# Patient Record
Sex: FEMALE | Race: BLACK OR AFRICAN AMERICAN | NOT HISPANIC OR LATINO | Employment: UNEMPLOYED | RURAL
[De-identification: names, ages, dates, MRNs, and addresses within clinical notes are randomized per-mention and may not be internally consistent; named-entity substitution may affect disease eponyms.]

---

## 2019-10-01 ENCOUNTER — HISTORICAL (OUTPATIENT)
Dept: ADMINISTRATIVE | Facility: HOSPITAL | Age: 19
End: 2019-10-01

## 2019-10-03 LAB
LAB AP CLINICAL INFORMATION: NORMAL
LAB AP DIAGNOSIS - HISTORICAL: NORMAL
LAB AP GROSS PATHOLOGY - HISTORICAL: NORMAL
LAB AP SPECIMEN SUBMITTED - HISTORICAL: NORMAL

## 2020-09-01 ENCOUNTER — HISTORICAL (OUTPATIENT)
Dept: ADMINISTRATIVE | Facility: HOSPITAL | Age: 20
End: 2020-09-01

## 2020-09-08 LAB
LAB AP CLINICAL INFORMATION: NORMAL
LAB AP COMMENTS: NORMAL
LAB AP GENERAL CAT - HISTORICAL: NORMAL
LAB AP INTERPRETATION/RESULT - HISTORICAL: NEGATIVE
LAB AP SPECIMEN ADEQUACY - HISTORICAL: NORMAL
LAB AP SPECIMEN SUBMITTED - HISTORICAL: NORMAL

## 2021-07-14 ENCOUNTER — HOSPITAL ENCOUNTER (EMERGENCY)
Facility: HOSPITAL | Age: 21
Discharge: HOME | End: 2021-07-14
Payer: MEDICAID

## 2021-07-14 VITALS
DIASTOLIC BLOOD PRESSURE: 73 MMHG | HEART RATE: 99 BPM | BODY MASS INDEX: 27.58 KG/M2 | TEMPERATURE: 100 F | WEIGHT: 182 LBS | OXYGEN SATURATION: 97 % | SYSTOLIC BLOOD PRESSURE: 114 MMHG | HEIGHT: 68 IN | RESPIRATION RATE: 18 BRPM

## 2021-07-14 DIAGNOSIS — R05.9 COUGH: ICD-10-CM

## 2021-07-14 DIAGNOSIS — U07.1 COVID-19 VIRUS INFECTION: Primary | ICD-10-CM

## 2021-07-14 LAB — SARS-COV-2 RNA RESP QL NAA+PROBE: POSITIVE

## 2021-07-14 PROCEDURE — 99283 EMERGENCY DEPT VISIT LOW MDM: CPT | Mod: ,,, | Performed by: NURSE PRACTITIONER

## 2021-07-14 PROCEDURE — 99284 EMERGENCY DEPT VISIT MOD MDM: CPT | Performed by: NURSE PRACTITIONER

## 2021-07-14 PROCEDURE — 99284 EMERGENCY DEPT VISIT MOD MDM: CPT

## 2021-07-14 PROCEDURE — 99283 PR EMERGENCY DEPT VISIT,LEVEL III: ICD-10-PCS | Mod: ,,, | Performed by: NURSE PRACTITIONER

## 2021-07-14 PROCEDURE — 87635 SARS-COV-2 COVID-19 AMP PRB: CPT | Performed by: NURSE PRACTITIONER

## 2021-07-14 RX ORDER — ALBUTEROL SULFATE 90 UG/1
2 AEROSOL, METERED RESPIRATORY (INHALATION) EVERY 6 HOURS PRN
Qty: 18 G | Refills: 0 | Status: SHIPPED | OUTPATIENT
Start: 2021-07-14 | End: 2022-07-14

## 2021-07-14 RX ORDER — FAMOTIDINE 40 MG/1
40 TABLET, FILM COATED ORAL 2 TIMES DAILY
Qty: 30 TABLET | Refills: 0 | Status: SHIPPED | OUTPATIENT
Start: 2021-07-14 | End: 2023-01-30 | Stop reason: CLARIF

## 2021-07-14 RX ORDER — AZITHROMYCIN 250 MG/1
TABLET, FILM COATED ORAL
Qty: 6 TABLET | Refills: 0 | Status: SHIPPED | OUTPATIENT
Start: 2021-07-14 | End: 2023-01-30 | Stop reason: CLARIF

## 2021-07-14 RX ORDER — ALBUTEROL SULFATE 0.63 MG/3ML
0.63 SOLUTION RESPIRATORY (INHALATION) EVERY 6 HOURS PRN
Qty: 1 BOX | Refills: 0 | Status: SHIPPED | OUTPATIENT
Start: 2021-07-14 | End: 2021-07-14

## 2021-07-14 RX ORDER — METHYLPREDNISOLONE 4 MG/1
TABLET ORAL
Qty: 21 TABLET | Refills: 0 | Status: SHIPPED | OUTPATIENT
Start: 2021-07-14 | End: 2023-01-30 | Stop reason: CLARIF

## 2022-04-25 ENCOUNTER — OFFICE VISIT (OUTPATIENT)
Dept: OBSTETRICS AND GYNECOLOGY | Facility: CLINIC | Age: 22
End: 2022-04-25
Payer: MEDICAID

## 2022-04-25 VITALS
DIASTOLIC BLOOD PRESSURE: 93 MMHG | WEIGHT: 210 LBS | OXYGEN SATURATION: 99 % | SYSTOLIC BLOOD PRESSURE: 139 MMHG | BODY MASS INDEX: 31.83 KG/M2 | HEIGHT: 68 IN | HEART RATE: 79 BPM | TEMPERATURE: 98 F

## 2022-04-25 DIAGNOSIS — Z30.016 ENCOUNTER FOR INITIAL PRESCRIPTION OF TRANSDERMAL PATCH HORMONAL CONTRACEPTIVE DEVICE: ICD-10-CM

## 2022-04-25 DIAGNOSIS — Z30.46 ENCOUNTER FOR NEXPLANON REMOVAL: Primary | ICD-10-CM

## 2022-04-25 PROCEDURE — 3008F BODY MASS INDEX DOCD: CPT | Mod: CPTII,,, | Performed by: ADVANCED PRACTICE MIDWIFE

## 2022-04-25 PROCEDURE — 1159F PR MEDICATION LIST DOCUMENTED IN MEDICAL RECORD: ICD-10-PCS | Mod: CPTII,,, | Performed by: ADVANCED PRACTICE MIDWIFE

## 2022-04-25 PROCEDURE — 11982 REMOVE DRUG IMPLANT DEVICE: CPT | Mod: ,,, | Performed by: ADVANCED PRACTICE MIDWIFE

## 2022-04-25 PROCEDURE — 3080F PR MOST RECENT DIASTOLIC BLOOD PRESSURE >= 90 MM HG: ICD-10-PCS | Mod: CPTII,,, | Performed by: ADVANCED PRACTICE MIDWIFE

## 2022-04-25 PROCEDURE — 11982 PR REMOVAL DRUG IMPLANT DEVICE: ICD-10-PCS | Mod: ,,, | Performed by: ADVANCED PRACTICE MIDWIFE

## 2022-04-25 PROCEDURE — 3008F PR BODY MASS INDEX (BMI) DOCUMENTED: ICD-10-PCS | Mod: CPTII,,, | Performed by: ADVANCED PRACTICE MIDWIFE

## 2022-04-25 PROCEDURE — 3075F PR MOST RECENT SYSTOLIC BLOOD PRESS GE 130-139MM HG: ICD-10-PCS | Mod: CPTII,,, | Performed by: ADVANCED PRACTICE MIDWIFE

## 2022-04-25 PROCEDURE — 1159F MED LIST DOCD IN RCRD: CPT | Mod: CPTII,,, | Performed by: ADVANCED PRACTICE MIDWIFE

## 2022-04-25 PROCEDURE — 99499 UNLISTED E&M SERVICE: CPT | Mod: ,,, | Performed by: ADVANCED PRACTICE MIDWIFE

## 2022-04-25 PROCEDURE — 3080F DIAST BP >= 90 MM HG: CPT | Mod: CPTII,,, | Performed by: ADVANCED PRACTICE MIDWIFE

## 2022-04-25 PROCEDURE — 3075F SYST BP GE 130 - 139MM HG: CPT | Mod: CPTII,,, | Performed by: ADVANCED PRACTICE MIDWIFE

## 2022-04-25 PROCEDURE — 99499 NO LOS: ICD-10-PCS | Mod: ,,, | Performed by: ADVANCED PRACTICE MIDWIFE

## 2022-04-25 RX ORDER — NORELGESTROMIN AND ETHINYL ESTRADIOL 35; 150 UG/MG; UG/MG
1 PATCH TRANSDERMAL
Qty: 3 PATCH | Refills: 11 | Status: SHIPPED | OUTPATIENT
Start: 2022-04-25 | End: 2022-06-20

## 2022-04-25 NOTE — PROGRESS NOTES
Nexplanon REMOVAL:    Pt is a 22 y.o. with LMP Patient's last menstrual period was 04/04/2022 (lmp unknown). here for Nexplanon removal because she desires to change contraceptive. Had Nexplanon placed 2020, has had problems with breakthrough bleeding.       PRE-Nexplanon REMOVAL COUNSELING:  The patient was advised of minimal risks of bleeding and pain and she agrees to proceed.    EXAM:  Nexplanon palpable by palpation.   The end closest to the elbow was marked.    PROCEDURE:  TIME OUT PERFORMED.  The patient semi north position with nondominant arm raised.   The area was prepped with antiseptic.  2 cc of 1% lidocaine with epi was injected just underneath end of implant closest to the elbow.  Downward pressure was placed on the end of the implant closest to the axilla.  Using sterile technique, a 2-3 mm incision was made in longitudinal direction of arm at tip of the implant closest to the elbow.    The entire 4 cm implant was removed.  The incision site was closed with steri strips and a small adhesive bandage and then pressure bandage was placed over insertion site.  The procedure was well tolerated     Assessment       POST NEXPLANON REMOVAL COUNSELING:  Expect period-like flow to occur after Nexplanon removal and periods to return to pre-Nexplanon pattern.  Manage post Nexplanon removal arm pain with Tylenol or NSAIDs  Keep arm elevated and apply intermittent ice packs to decrease pain and bruising for 24 hours.  May remove bandage in 24 hours, remove steri-strips in 5-7 days  Nexplanon removal danger signs (worsening pain at incision site, bleeding through  bandage, redness and/or pus drainage at incision site).    POST NEXPLANON REMOVAL CONTRACEPTION: Patches    PLAN:  Counseling lasted approximately 15 minutes and all her questions were answered.      ICD-10-CM ICD-9-CM    1. Encounter for Nexplanon removal  Z30.46 V25.43    2. Encounter for initial prescription of transdermal patch hormonal contraceptive  device  Z30.016 V25.02 norelgestromin-ethinyl estradiol (ORTHO EVRA) 150-35 mcg/24 hr   3. BMI 31.0-31.9,adult  Z68.31 V85.31        Follow up in about 3 months (around 7/25/2022) for surveillance of .

## 2022-05-12 ENCOUNTER — OFFICE VISIT (OUTPATIENT)
Dept: OBSTETRICS AND GYNECOLOGY | Facility: CLINIC | Age: 22
End: 2022-05-12
Payer: MEDICAID

## 2022-05-12 VITALS
HEIGHT: 68 IN | SYSTOLIC BLOOD PRESSURE: 135 MMHG | WEIGHT: 210.19 LBS | HEART RATE: 75 BPM | RESPIRATION RATE: 17 BRPM | DIASTOLIC BLOOD PRESSURE: 84 MMHG | BODY MASS INDEX: 31.86 KG/M2 | OXYGEN SATURATION: 99 %

## 2022-05-12 DIAGNOSIS — N92.1 MENORRHAGIA WITH IRREGULAR CYCLE: Primary | ICD-10-CM

## 2022-05-12 PROCEDURE — 3075F PR MOST RECENT SYSTOLIC BLOOD PRESS GE 130-139MM HG: ICD-10-PCS | Mod: CPTII,,, | Performed by: ADVANCED PRACTICE MIDWIFE

## 2022-05-12 PROCEDURE — 3079F PR MOST RECENT DIASTOLIC BLOOD PRESSURE 80-89 MM HG: ICD-10-PCS | Mod: CPTII,,, | Performed by: ADVANCED PRACTICE MIDWIFE

## 2022-05-12 PROCEDURE — 3008F BODY MASS INDEX DOCD: CPT | Mod: CPTII,,, | Performed by: ADVANCED PRACTICE MIDWIFE

## 2022-05-12 PROCEDURE — 99212 OFFICE O/P EST SF 10 MIN: CPT | Mod: ,,, | Performed by: ADVANCED PRACTICE MIDWIFE

## 2022-05-12 PROCEDURE — 3075F SYST BP GE 130 - 139MM HG: CPT | Mod: CPTII,,, | Performed by: ADVANCED PRACTICE MIDWIFE

## 2022-05-12 PROCEDURE — 99212 PR OFFICE/OUTPT VISIT, EST, LEVL II, 10-19 MIN: ICD-10-PCS | Mod: ,,, | Performed by: ADVANCED PRACTICE MIDWIFE

## 2022-05-12 PROCEDURE — 3008F PR BODY MASS INDEX (BMI) DOCUMENTED: ICD-10-PCS | Mod: CPTII,,, | Performed by: ADVANCED PRACTICE MIDWIFE

## 2022-05-12 PROCEDURE — 1159F PR MEDICATION LIST DOCUMENTED IN MEDICAL RECORD: ICD-10-PCS | Mod: CPTII,,, | Performed by: ADVANCED PRACTICE MIDWIFE

## 2022-05-12 PROCEDURE — 3079F DIAST BP 80-89 MM HG: CPT | Mod: CPTII,,, | Performed by: ADVANCED PRACTICE MIDWIFE

## 2022-05-12 PROCEDURE — 1159F MED LIST DOCD IN RCRD: CPT | Mod: CPTII,,, | Performed by: ADVANCED PRACTICE MIDWIFE

## 2022-05-12 RX ORDER — MEDROXYPROGESTERONE ACETATE 10 MG/1
10 TABLET ORAL DAILY
Qty: 10 TABLET | Refills: 0 | Status: SHIPPED | OUTPATIENT
Start: 2022-05-12 | End: 2022-06-20

## 2022-05-12 NOTE — PROGRESS NOTES
"Patient ID:  Nida Gold is a 22 y.o. female      Chief Complaint:   Chief Complaint   Patient presents with    Vaginal Bleeding     X 3 weeks        HPI:  Nida is in with c/o vaginal bleeding x 3 weeks. She had Nexplanon removed 2022, started on patches at that time. Likes using patches, denies ACHES. Discussed changes in menstrual cycle bleeding as common with contraceptive changes.   LMP: Patient's last menstrual period was 2022.   Sexually active:  yes  Contraceptive: patches  Last Pap: ASCUS 2020 in [facility], no HPV noted      History reviewed. No pertinent past medical history.  History reviewed. No pertinent surgical history.    OB History        2    Para   2    Term                AB        Living   2       SAB        IAB        Ectopic        Multiple        Live Births   2                 /84 (BP Location: Right arm)   Pulse 75   Resp 17   Ht 5' 8" (1.727 m)   Wt 95.3 kg (210 lb 3.2 oz)   LMP 2022   SpO2 99%   BMI 31.96 kg/m²   Wt Readings from Last 3 Encounters:   22 95.3 kg (210 lb 3.2 oz)   22 95.3 kg (210 lb)   21 82.6 kg (182 lb)          ROS:  Review of Systems   Constitutional: Negative.    Eyes: Negative.    Respiratory: Negative.    Cardiovascular: Negative.    Gastrointestinal: Negative.    Genitourinary: Positive for menorrhagia and menstrual problem.   Musculoskeletal: Negative.    Neurological: Negative.    Psychiatric/Behavioral: Negative.           PHYSICAL EXAM:  Physical Exam  Constitutional:       Appearance: Normal appearance. She is obese.   Eyes:      Extraocular Movements: Extraocular movements intact.   Cardiovascular:      Rate and Rhythm: Normal rate.      Pulses: Normal pulses.   Pulmonary:      Effort: Pulmonary effort is normal.   Abdominal:      Palpations: Abdomen is soft.   Musculoskeletal:         General: Normal range of motion.      Cervical back: Normal range of motion.   Skin:     General: Skin is " warm and dry.   Neurological:      Mental Status: She is alert and oriented to person, place, and time.   Psychiatric:         Mood and Affect: Mood normal.         Behavior: Behavior normal.         Thought Content: Thought content normal.         Judgment: Judgment normal.          Assessment:  Nida was seen today for vaginal bleeding.    Diagnoses and all orders for this visit:    Menorrhagia with irregular cycle  -     medroxyPROGESTERone (PROVERA) 10 MG tablet; Take 1 tablet (10 mg total) by mouth once daily. for 10 days    BMI 31.0-31.9,adult          ICD-10-CM ICD-9-CM    1. Menorrhagia with irregular cycle  N92.1 626.2 medroxyPROGESTERone (PROVERA) 10 MG tablet   2. BMI 31.0-31.9,adult  Z68.31 V85.31        Plan:  Discussed changes in cycle bleeding common with contraceptive  Provera dosing rx sent with instructions on usage    Follow up if symptoms worsen or fail to improve.

## 2022-06-18 ENCOUNTER — HOSPITAL ENCOUNTER (EMERGENCY)
Facility: HOSPITAL | Age: 22
Discharge: STILL ADMITTED OR ADMINISTRATIVE | End: 2022-06-18
Payer: MEDICAID

## 2022-06-18 VITALS
OXYGEN SATURATION: 98 % | RESPIRATION RATE: 18 BRPM | HEART RATE: 84 BPM | HEIGHT: 68 IN | BODY MASS INDEX: 32.01 KG/M2 | TEMPERATURE: 98 F | DIASTOLIC BLOOD PRESSURE: 90 MMHG | WEIGHT: 211.19 LBS | SYSTOLIC BLOOD PRESSURE: 145 MMHG

## 2022-06-18 DIAGNOSIS — Z11.3 SCREENING FOR STDS (SEXUALLY TRANSMITTED DISEASES): Primary | ICD-10-CM

## 2022-06-18 PROCEDURE — 99281 EMR DPT VST MAYX REQ PHY/QHP: CPT | Mod: ,,, | Performed by: NURSE PRACTITIONER

## 2022-06-18 PROCEDURE — 99281 PR EMERGENCY DEPT VISIT,LEVEL I: ICD-10-PCS | Mod: ,,, | Performed by: NURSE PRACTITIONER

## 2022-06-18 PROCEDURE — 99281 EMR DPT VST MAYX REQ PHY/QHP: CPT

## 2022-06-19 NOTE — ED TRIAGE NOTES
PRESENTS TO ER WITH REPORT OF HEADACHE X 1 DAY. DENIES COUGH, FEVER, SHORTNESS OF BREATH AND CHEST PAIN. REPORTS LAST MENSTRUAL PERIOD WAS 6/14/2022 BUT WE COULD CHECK THAT TODAY. ALSO HERE FOR A COVID TEST PER ADMISSIONS INITIAL COMPLAINT

## 2022-06-20 ENCOUNTER — OFFICE VISIT (OUTPATIENT)
Dept: OBSTETRICS AND GYNECOLOGY | Facility: CLINIC | Age: 22
End: 2022-06-20
Payer: MEDICAID

## 2022-06-20 ENCOUNTER — PATIENT MESSAGE (OUTPATIENT)
Dept: OBSTETRICS AND GYNECOLOGY | Facility: CLINIC | Age: 22
End: 2022-06-20
Payer: MEDICAID

## 2022-06-20 VITALS
OXYGEN SATURATION: 99 % | BODY MASS INDEX: 31.32 KG/M2 | HEIGHT: 68 IN | DIASTOLIC BLOOD PRESSURE: 98 MMHG | WEIGHT: 206.63 LBS | SYSTOLIC BLOOD PRESSURE: 132 MMHG | HEART RATE: 99 BPM

## 2022-06-20 DIAGNOSIS — Z32.02 URINE PREGNANCY TEST NEGATIVE: ICD-10-CM

## 2022-06-20 DIAGNOSIS — Z20.2 ENCOUNTER FOR ASSESSMENT OF STD EXPOSURE: ICD-10-CM

## 2022-06-20 DIAGNOSIS — Z30.011 ENCOUNTER FOR INITIAL PRESCRIPTION OF CONTRACEPTIVE PILLS: ICD-10-CM

## 2022-06-20 DIAGNOSIS — Z12.4 ENCOUNTER FOR PAPANICOLAOU SMEAR FOR CERVICAL CANCER SCREENING: ICD-10-CM

## 2022-06-20 DIAGNOSIS — Z01.419 WOMEN'S ANNUAL ROUTINE GYNECOLOGICAL EXAMINATION: Primary | ICD-10-CM

## 2022-06-20 DIAGNOSIS — Z72.51 HIGH RISK HETEROSEXUAL BEHAVIOR: ICD-10-CM

## 2022-06-20 PROBLEM — N92.1 MENORRHAGIA WITH IRREGULAR CYCLE: Status: RESOLVED | Noted: 2022-05-12 | Resolved: 2022-06-20

## 2022-06-20 LAB
CANDIDA SPECIES: NEGATIVE
GARDNERELLA: POSITIVE
HIV 1+O+2 AB SERPL QL: NORMAL
SYPHILIS AB INTERPRETATION: NORMAL
TRICHOMONAS: POSITIVE

## 2022-06-20 PROCEDURE — 96372 THER/PROPH/DIAG INJ SC/IM: CPT | Mod: ,,, | Performed by: ADVANCED PRACTICE MIDWIFE

## 2022-06-20 PROCEDURE — 87389 HIV 1 / 2 ANTIBODY: ICD-10-PCS | Mod: ,,, | Performed by: CLINICAL MEDICAL LABORATORY

## 2022-06-20 PROCEDURE — 87389 HIV-1 AG W/HIV-1&-2 AB AG IA: CPT | Mod: ,,, | Performed by: CLINICAL MEDICAL LABORATORY

## 2022-06-20 PROCEDURE — 99395 PREV VISIT EST AGE 18-39: CPT | Mod: 25,,, | Performed by: ADVANCED PRACTICE MIDWIFE

## 2022-06-20 PROCEDURE — 87660 BACTERIAL VAGINOSIS: ICD-10-PCS | Mod: ,,, | Performed by: CLINICAL MEDICAL LABORATORY

## 2022-06-20 PROCEDURE — 86780 TREPONEMA PALLIDUM: CPT | Mod: ,,, | Performed by: CLINICAL MEDICAL LABORATORY

## 2022-06-20 PROCEDURE — 87660 TRICHOMONAS VAGIN DIR PROBE: CPT | Mod: ,,, | Performed by: CLINICAL MEDICAL LABORATORY

## 2022-06-20 PROCEDURE — 86780 TREPONEMA PALLIDUM (SYPHILIS) ANTIBODY: ICD-10-PCS | Mod: ,,, | Performed by: CLINICAL MEDICAL LABORATORY

## 2022-06-20 PROCEDURE — 87510 BACTERIAL VAGINOSIS: ICD-10-PCS | Mod: ,,, | Performed by: CLINICAL MEDICAL LABORATORY

## 2022-06-20 PROCEDURE — 87510 GARDNER VAG DNA DIR PROBE: CPT | Mod: ,,, | Performed by: CLINICAL MEDICAL LABORATORY

## 2022-06-20 PROCEDURE — 87480 BACTERIAL VAGINOSIS: ICD-10-PCS | Mod: ,,, | Performed by: CLINICAL MEDICAL LABORATORY

## 2022-06-20 PROCEDURE — 1159F MED LIST DOCD IN RCRD: CPT | Mod: CPTII,,, | Performed by: ADVANCED PRACTICE MIDWIFE

## 2022-06-20 PROCEDURE — 3080F PR MOST RECENT DIASTOLIC BLOOD PRESSURE >= 90 MM HG: ICD-10-PCS | Mod: CPTII,,, | Performed by: ADVANCED PRACTICE MIDWIFE

## 2022-06-20 PROCEDURE — 87480 CANDIDA DNA DIR PROBE: CPT | Mod: ,,, | Performed by: CLINICAL MEDICAL LABORATORY

## 2022-06-20 PROCEDURE — 3008F BODY MASS INDEX DOCD: CPT | Mod: CPTII,,, | Performed by: ADVANCED PRACTICE MIDWIFE

## 2022-06-20 PROCEDURE — 3075F SYST BP GE 130 - 139MM HG: CPT | Mod: CPTII,,, | Performed by: ADVANCED PRACTICE MIDWIFE

## 2022-06-20 PROCEDURE — 87591 N.GONORRHOEAE DNA AMP PROB: CPT | Mod: ,,, | Performed by: CLINICAL MEDICAL LABORATORY

## 2022-06-20 PROCEDURE — 88141 THINPREP PAP TEST: ICD-10-PCS | Mod: ,,, | Performed by: PATHOLOGY

## 2022-06-20 PROCEDURE — 1159F PR MEDICATION LIST DOCUMENTED IN MEDICAL RECORD: ICD-10-PCS | Mod: CPTII,,, | Performed by: ADVANCED PRACTICE MIDWIFE

## 2022-06-20 PROCEDURE — 88141 CYTOPATH C/V INTERPRET: CPT | Mod: ,,, | Performed by: PATHOLOGY

## 2022-06-20 PROCEDURE — 87491 CHLAMYDIA/GONORRHOEAE(GC), PCR: ICD-10-PCS | Mod: ,,, | Performed by: CLINICAL MEDICAL LABORATORY

## 2022-06-20 PROCEDURE — 88142 CYTOPATH C/V THIN LAYER: CPT | Mod: GCY | Performed by: ADVANCED PRACTICE MIDWIFE

## 2022-06-20 PROCEDURE — 87591 CHLAMYDIA/GONORRHOEAE(GC), PCR: ICD-10-PCS | Mod: ,,, | Performed by: CLINICAL MEDICAL LABORATORY

## 2022-06-20 PROCEDURE — 3008F PR BODY MASS INDEX (BMI) DOCUMENTED: ICD-10-PCS | Mod: CPTII,,, | Performed by: ADVANCED PRACTICE MIDWIFE

## 2022-06-20 PROCEDURE — 87624 HPV HI-RISK TYP POOLED RSLT: CPT | Mod: ,,, | Performed by: CLINICAL MEDICAL LABORATORY

## 2022-06-20 PROCEDURE — 99395 PR PREVENTIVE VISIT,EST,18-39: ICD-10-PCS | Mod: 25,,, | Performed by: ADVANCED PRACTICE MIDWIFE

## 2022-06-20 PROCEDURE — 3075F PR MOST RECENT SYSTOLIC BLOOD PRESS GE 130-139MM HG: ICD-10-PCS | Mod: CPTII,,, | Performed by: ADVANCED PRACTICE MIDWIFE

## 2022-06-20 PROCEDURE — 3080F DIAST BP >= 90 MM HG: CPT | Mod: CPTII,,, | Performed by: ADVANCED PRACTICE MIDWIFE

## 2022-06-20 PROCEDURE — 87624 HUMAN PAPILLOMAVIRUS (HPV): ICD-10-PCS | Mod: ,,, | Performed by: CLINICAL MEDICAL LABORATORY

## 2022-06-20 PROCEDURE — 87491 CHLMYD TRACH DNA AMP PROBE: CPT | Mod: ,,, | Performed by: CLINICAL MEDICAL LABORATORY

## 2022-06-20 PROCEDURE — 96372 PR INJECTION,THERAP/PROPH/DIAG2ST, IM OR SUBCUT: ICD-10-PCS | Mod: ,,, | Performed by: ADVANCED PRACTICE MIDWIFE

## 2022-06-20 RX ORDER — MEDROXYPROGESTERONE ACETATE 150 MG/ML
150 INJECTION, SUSPENSION INTRAMUSCULAR
Status: COMPLETED | OUTPATIENT
Start: 2022-06-20 | End: 2022-06-20

## 2022-06-20 RX ADMIN — MEDROXYPROGESTERONE ACETATE 150 MG: 150 INJECTION, SUSPENSION INTRAMUSCULAR at 02:06

## 2022-06-20 NOTE — PROGRESS NOTES
"  Patient ID: Nida Gold is a 22 y.o. female     Chief Complaint:   Chief Complaint   Patient presents with    Annual Exam     Pap: last pap over two years ago     STD CHECK       HPI: Nida Gold is a 22 y.o. female who presents for well woman exam.  No issues, problems, or complaints.  Last Pap ASCUS 2020, unknown HPV. She uses patches for contraceptive but does not like them as she has not been using them consistently. Reports last cycles lasted 11 days after last patches came off while she was swimming. Denies ACHES, vag discharge, and abnormal vag bleeding. She has used implant previously, removed to conceive. Discussed contraceptive options including r/b/a implant, injection, OCP, IUD, and ring. She desires to start depo today.   LMP: Patient's last menstrual period was 2022.  Sexually active: yes   Contraceptive: patches, last used 2 weeks past  Mammogram: n/a    Past Medical History:   Diagnosis Date    Menorrhagia with irregular cycle 2022       History reviewed. No pertinent surgical history.    Social History     Socioeconomic History    Marital status: Single   Tobacco Use    Smoking status: Never Smoker    Smokeless tobacco: Never Used   Substance and Sexual Activity    Alcohol use: Never    Drug use: Never    Sexual activity: Yes     Partners: Male     Birth control/protection: Patch       History reviewed. No pertinent family history.    OB History        2    Para   2    Term                AB        Living   2       SAB        IAB        Ectopic        Multiple        Live Births   2                 BP (!) 132/98 (BP Location: Right arm, Patient Position: Sitting, BP Method: Large (Automatic))   Pulse 99   Ht 5' 8" (1.727 m)   Wt 93.7 kg (206 lb 9.6 oz)   LMP 2022   SpO2 99%   BMI 31.41 kg/m²     Wt Readings from Last 3 Encounters:   22 93.7 kg (206 lb 9.6 oz)   22 95.8 kg (211 lb 3.2 oz)   22 95.3 kg (210 lb 3.2 oz)    "     ROS:  Review of Systems   Constitutional: Negative.    Eyes: Negative.    Respiratory: Negative.    Cardiovascular: Negative.    Gastrointestinal: Negative.    Endocrine: Negative.    Genitourinary: Negative.    Musculoskeletal: Negative.    Integumentary:  Negative.   Neurological: Negative.    Hematological: Negative.    Psychiatric/Behavioral: Negative.    Breast: negative.         PHYSICAL EXAM:  Physical Exam  Constitutional:       Appearance: Normal appearance. She is obese.   HENT:      Head: Normocephalic.      Nose: Nose normal.   Eyes:      Extraocular Movements: Extraocular movements intact.   Cardiovascular:      Rate and Rhythm: Normal rate and regular rhythm.      Pulses: Normal pulses.      Heart sounds: Normal heart sounds.   Pulmonary:      Effort: Pulmonary effort is normal.      Breath sounds: Normal breath sounds.   Abdominal:      Palpations: Abdomen is soft.   Genitourinary:     General: Normal vulva.      Exam position: Lithotomy position.      Pubic Area: No rash.       Labia:         Right: No rash.         Left: No rash.       Urethra: No prolapse.      Vagina: Normal.      Cervix: Normal.      Uterus: Normal.       Adnexa:         Right: No tenderness.          Left: No tenderness.     Musculoskeletal:         General: Normal range of motion.      Cervical back: Normal range of motion and neck supple.   Skin:     General: Skin is warm and dry.   Neurological:      Mental Status: She is alert and oriented to person, place, and time.   Psychiatric:         Mood and Affect: Mood normal.         Behavior: Behavior normal.         Thought Content: Thought content normal.         Judgment: Judgment normal.          Assessment:  Women's annual routine gynecological examination    Encounter for assessment of STD exposure  -     Bacterial Vaginosis; Future; Expected date: 06/20/2022  -     Chlamydia/GC, PCR; Future; Expected date: 06/20/2022  -     HIV 1/2 Ag/Ab (4th Gen); Future; Expected  date: 06/20/2022  -     Treponema Pallidum (Syphillis) Antibody; Future; Expected date: 06/20/2022    High risk heterosexual behavior  -     Bacterial Vaginosis; Future; Expected date: 06/20/2022  -     Chlamydia/GC, PCR; Future; Expected date: 06/20/2022  -     HIV 1/2 Ag/Ab (4th Gen); Future; Expected date: 06/20/2022  -     Treponema Pallidum (Syphillis) Antibody; Future; Expected date: 06/20/2022    Encounter for Papanicolaou smear for cervical cancer screening  -     ThinPrep Pap Test; Future; Expected date: 06/20/2022    Encounter for initial prescription of contraceptive pills  -     POCT urine pregnancy  -     medroxyPROGESTERone (DEPO-PROVERA) injection 150 mg    Urine pregnancy test negative    BMI 31.0-31.9,adult          ICD-10-CM ICD-9-CM    1. Women's annual routine gynecological examination  Z01.419 V72.31    2. Encounter for assessment of STD exposure  Z76.89 V72.85 Bacterial Vaginosis      Chlamydia/GC, PCR      HIV 1/2 Ag/Ab (4th Gen)      Treponema Pallidum (Syphillis) Antibody      Bacterial Vaginosis      Chlamydia/GC, PCR      HIV 1/2 Ag/Ab (4th Gen)      Treponema Pallidum (Syphillis) Antibody   3. High risk heterosexual behavior  Z72.51 V69.2 Bacterial Vaginosis      Chlamydia/GC, PCR      HIV 1/2 Ag/Ab (4th Gen)      Treponema Pallidum (Syphillis) Antibody      Bacterial Vaginosis      Chlamydia/GC, PCR      HIV 1/2 Ag/Ab (4th Gen)      Treponema Pallidum (Syphillis) Antibody   4. Encounter for Papanicolaou smear for cervical cancer screening  Z12.4 V76.2 ThinPrep Pap Test      ThinPrep Pap Test   5. Encounter for initial prescription of contraceptive pills  Z30.011 V25.01 POCT urine pregnancy      medroxyPROGESTERone (DEPO-PROVERA) injection 150 mg   6. Urine pregnancy test negative  Z32.02 V72.41    7. BMI 31.0-31.9,adult  Z68.31 V85.31        Plan:  Annual exam completed, Pap and Affirm collected  Urine sent for GC/Chlamydia  Labs ordered and drawn  Will call with results, encouraged use  of My Chart for review  UPT negative  Depo injection 150 mg given IM, reviewed possible s/e  Patient was counseled today on ASCCP Pap guidelines and recommendations for yearly pelvic exams   Encouraged healthy dietary choices, increasing water intake, and exercising at least 3 x weekly    Follow up in about 1 year (around 6/20/2023) for annual gyn exam.

## 2022-06-21 DIAGNOSIS — N76.0 BACTERIAL VAGINAL INFECTION: Primary | ICD-10-CM

## 2022-06-21 DIAGNOSIS — B96.89 BACTERIAL VAGINAL INFECTION: Primary | ICD-10-CM

## 2022-06-21 DIAGNOSIS — A59.9 TRICHIMONIASIS: ICD-10-CM

## 2022-06-21 LAB
CHLAMYDIA BY PCR: NEGATIVE
N. GONORRHOEAE (GC) BY PCR: NEGATIVE

## 2022-06-21 RX ORDER — FLUCONAZOLE 150 MG/1
150 TABLET ORAL
Qty: 2 TABLET | Refills: 0 | Status: SHIPPED | OUTPATIENT
Start: 2022-06-21 | End: 2022-06-29

## 2022-06-21 RX ORDER — METRONIDAZOLE 500 MG/1
500 TABLET ORAL 2 TIMES DAILY
Qty: 14 TABLET | Refills: 0 | Status: SHIPPED | OUTPATIENT
Start: 2022-06-21 | End: 2022-06-28

## 2022-06-21 NOTE — PROGRESS NOTES
Nida has seen her results on My Chart. Let her know Trich is a STD requiring partner treatment.  I have sent prescriptions to her pharmacy for Flagyl and Diflucan, Flagyl treats both BV and Trich. Can treat partner if she wants. No sex until at least a week after both have been treated. Will send message or call with Pap results.

## 2022-06-22 ENCOUNTER — TELEPHONE (OUTPATIENT)
Dept: OBSTETRICS AND GYNECOLOGY | Facility: CLINIC | Age: 22
End: 2022-06-22
Payer: MEDICAID

## 2022-06-22 NOTE — TELEPHONE ENCOUNTER
----- Message from Nitin Alves sent at 6/21/2022 11:22 AM CDT -----  Regarding: Patient is needing to talk to a nurse about an issue.  Patient is needing to talk to a nurse about an issue.  Please call  [phone]

## 2022-06-23 LAB
GH SERPL-MCNC: ABNORMAL NG/ML
INSULIN SERPL-ACNC: ABNORMAL U[IU]/ML
LAB AP CLINICAL INFORMATION: ABNORMAL
LAB AP GYN INTERPRETATION: ABNORMAL
LAB AP PAP DISCLAIMER COMMENTS: ABNORMAL
RENIN PLAS-CCNC: ABNORMAL NG/ML/H

## 2022-06-24 ENCOUNTER — TELEPHONE (OUTPATIENT)
Dept: OBSTETRICS AND GYNECOLOGY | Facility: CLINIC | Age: 22
End: 2022-06-24
Payer: MEDICAID

## 2022-06-24 LAB
HPV 16: NEGATIVE
HPV 18: NEGATIVE
HPV OTHER: NEGATIVE

## 2022-06-26 NOTE — ED PROVIDER NOTES
Encounter Date: 6/18/2022       History     Chief Complaint   Patient presents with    Headache     Patient presents to ER with complaint of headache.  Upon entering room, patient states she is here for STD testing.  She denies headache.  She denies symptoms of STD but states she just wants to know that she is ok.  She denies change in sexual partners. Denies known exposure to STD.    The history is provided by the patient. No  was used.     Review of patient's allergies indicates:  No Known Allergies  Past Medical History:   Diagnosis Date    Menorrhagia with irregular cycle 5/12/2022     History reviewed. No pertinent surgical history.  History reviewed. No pertinent family history.  Social History     Tobacco Use    Smoking status: Never Smoker    Smokeless tobacco: Never Used   Substance Use Topics    Alcohol use: Never    Drug use: Never     Review of Systems   Genitourinary:        Desires STD screen.    All other systems reviewed and are negative.      Physical Exam     Initial Vitals [06/18/22 1925]   BP Pulse Resp Temp SpO2   (!) 145/90 84 18 98.4 °F (36.9 °C) 98 %      MAP       --         Physical Exam    Nursing note and vitals reviewed.  Constitutional: She appears well-developed and well-nourished.   HENT:   Head: Normocephalic.   Right Ear: External ear normal.   Left Ear: External ear normal.   Nose: Nose normal.   Mouth/Throat: Oropharynx is clear and moist.   Eyes: Conjunctivae and EOM are normal. Pupils are equal, round, and reactive to light.   Neck: Neck supple.   Normal range of motion.  Cardiovascular: Normal rate, regular rhythm, normal heart sounds and intact distal pulses.   Pulmonary/Chest: Breath sounds normal.   Abdominal: Abdomen is soft. Bowel sounds are normal.   Genitourinary:    Genitourinary Comments: Deferred.     Musculoskeletal:         General: Normal range of motion.      Cervical back: Normal range of motion and neck supple.     Neurological: She is  alert and oriented to person, place, and time. She has normal strength. GCS score is 15. GCS eye subscore is 4. GCS verbal subscore is 5. GCS motor subscore is 6.   Skin: Skin is warm and dry. Capillary refill takes less than 2 seconds.   Psychiatric: She has a normal mood and affect. Her behavior is normal. Judgment and thought content normal.         Medical Screening Exam   See Full Note    ED Course   Procedures  Labs Reviewed - No data to display       Imaging Results    None          Medications - No data to display                    Clinical Impression:   Final diagnoses:  [Z11.3] Screening for STDs (sexually transmitted diseases) (Primary)          ED Disposition Condition    Discharge Stable        ED Prescriptions     None        Follow-up Information    None          MONTANA Rangel  06/26/22 1220

## 2022-11-04 ENCOUNTER — HOSPITAL ENCOUNTER (EMERGENCY)
Facility: HOSPITAL | Age: 22
Discharge: HOME | End: 2022-11-04
Payer: MEDICAID

## 2022-11-04 VITALS
HEIGHT: 66 IN | TEMPERATURE: 98 F | SYSTOLIC BLOOD PRESSURE: 118 MMHG | WEIGHT: 193 LBS | BODY MASS INDEX: 31.02 KG/M2 | RESPIRATION RATE: 20 BRPM | HEART RATE: 85 BPM | DIASTOLIC BLOOD PRESSURE: 79 MMHG | OXYGEN SATURATION: 98 %

## 2022-11-04 DIAGNOSIS — J06.9 VIRAL URI WITH COUGH: Primary | ICD-10-CM

## 2022-11-04 LAB
FLUAV AG UPPER RESP QL IA.RAPID: NEGATIVE
FLUBV AG UPPER RESP QL IA.RAPID: NEGATIVE
SARS-COV+SARS-COV-2 AG RESP QL IA.RAPID: NEGATIVE

## 2022-11-04 PROCEDURE — 99282 PR EMERGENCY DEPT VISIT,LEVEL II: ICD-10-PCS | Mod: ,,, | Performed by: NURSE PRACTITIONER

## 2022-11-04 PROCEDURE — 99283 EMERGENCY DEPT VISIT LOW MDM: CPT

## 2022-11-04 PROCEDURE — 87428 SARSCOV & INF VIR A&B AG IA: CPT | Performed by: NURSE PRACTITIONER

## 2022-11-04 PROCEDURE — 99282 EMERGENCY DEPT VISIT SF MDM: CPT | Mod: ,,, | Performed by: NURSE PRACTITIONER

## 2022-11-04 NOTE — ED PROVIDER NOTES
Encounter Date: 11/4/2022       History     Chief Complaint   Patient presents with    Influenza     22 year old female presents to the emergency department to be evaluated for runny nose, cough, fever, bodyaches that began three days ago. Her niece recently tested positive for the flu, and she has been around her. Her daughter also has similar symptoms    The history is provided by the patient.   URI  The primary symptoms include cough. Primary symptoms do not include fever, fatigue, headaches, ear pain, sore throat, swollen glands, wheezing, abdominal pain, nausea, vomiting, myalgias, arthralgias or rash.   Symptoms associated with the illness include congestion and rhinorrhea.   Review of patient's allergies indicates:  No Known Allergies  Past Medical History:   Diagnosis Date    Menorrhagia with irregular cycle 5/12/2022     No past surgical history on file.  No family history on file.  Social History     Tobacco Use    Smoking status: Never    Smokeless tobacco: Never   Substance Use Topics    Alcohol use: Never    Drug use: Never     Review of Systems   Constitutional:  Negative for fatigue and fever.   HENT:  Positive for congestion and rhinorrhea. Negative for ear pain and sore throat.    Respiratory:  Positive for cough. Negative for wheezing.    Gastrointestinal:  Negative for abdominal pain, nausea and vomiting.   Musculoskeletal:  Negative for arthralgias and myalgias.   Skin:  Negative for rash.   Neurological:  Negative for headaches.   All other systems reviewed and are negative.    Physical Exam     Initial Vitals [11/04/22 0817]   BP Pulse Resp Temp SpO2   118/79 85 20 98.4 °F (36.9 °C) 98 %      MAP       --         Physical Exam    Vitals reviewed.  Constitutional: She appears well-developed and well-nourished.   HENT:   Right Ear: Tympanic membrane normal.   Left Ear: Tympanic membrane normal.   Mouth/Throat: Oropharynx is clear and moist.   Neck: Neck supple.   Cardiovascular:  Normal  rate and regular rhythm.           Pulmonary/Chest: Breath sounds normal.   Abdominal: Abdomen is soft. Bowel sounds are normal. She exhibits no distension and no mass. There is no abdominal tenderness. There is no rebound and no guarding.   Musculoskeletal:         General: Normal range of motion.      Cervical back: Neck supple.     Neurological: She is alert and oriented to person, place, and time. She has normal strength. GCS score is 15. GCS eye subscore is 4. GCS verbal subscore is 5. GCS motor subscore is 6.   Skin: Skin is warm and dry. Capillary refill takes less than 2 seconds.   Psychiatric: She has a normal mood and affect.       Medical Screening Exam   See Full Note    ED Course   Procedures  Labs Reviewed   SARS-COV2 (COVID) W/ FLU ANTIGEN          Imaging Results    None          Medications - No data to display                    Clinical Impression:   Final diagnoses:  [J06.9] Viral URI with cough (Primary)        ED Disposition Condition    Discharge Stable          ED Prescriptions    None       Follow-up Information    None          MONTANA Rosales  11/04/22 0857

## 2023-01-30 ENCOUNTER — HOSPITAL ENCOUNTER (EMERGENCY)
Facility: HOSPITAL | Age: 23
Discharge: HOME | End: 2023-01-30
Payer: MEDICAID

## 2023-01-30 VITALS
HEART RATE: 98 BPM | SYSTOLIC BLOOD PRESSURE: 134 MMHG | OXYGEN SATURATION: 100 % | TEMPERATURE: 98 F | HEIGHT: 69 IN | BODY MASS INDEX: 31.99 KG/M2 | WEIGHT: 216 LBS | RESPIRATION RATE: 18 BRPM | DIASTOLIC BLOOD PRESSURE: 69 MMHG

## 2023-01-30 DIAGNOSIS — R10.9 ABDOMINAL PAIN, UNSPECIFIED ABDOMINAL LOCATION: Primary | ICD-10-CM

## 2023-01-30 DIAGNOSIS — K52.9 GASTROENTERITIS: ICD-10-CM

## 2023-01-30 LAB
ALBUMIN SERPL BCP-MCNC: 3.5 G/DL (ref 3.5–5)
ALBUMIN/GLOB SERPL: 0.9 {RATIO}
ALP SERPL-CCNC: 110 U/L (ref 37–98)
ALT SERPL W P-5'-P-CCNC: 16 U/L (ref 13–56)
ANION GAP SERPL CALCULATED.3IONS-SCNC: 9 MMOL/L (ref 7–16)
AST SERPL W P-5'-P-CCNC: 14 U/L (ref 15–37)
B-HCG UR QL: NEGATIVE
BACTERIA #/AREA URNS HPF: ABNORMAL /HPF
BASOPHILS # BLD AUTO: 0.02 K/UL (ref 0–0.2)
BASOPHILS NFR BLD AUTO: 0.2 % (ref 0–1)
BILIRUB SERPL-MCNC: 0.4 MG/DL (ref ?–1.2)
BILIRUB UR QL STRIP: NEGATIVE
BUN SERPL-MCNC: 9 MG/DL (ref 7–18)
BUN/CREAT SERPL: 14 (ref 6–20)
CALCIUM SERPL-MCNC: 8.4 MG/DL (ref 8.5–10.1)
CHLORIDE SERPL-SCNC: 104 MMOL/L (ref 98–107)
CLARITY UR: CLEAR
CO2 SERPL-SCNC: 29 MMOL/L (ref 21–32)
COLOR UR: ABNORMAL
CREAT SERPL-MCNC: 0.63 MG/DL (ref 0.55–1.02)
CTP QC/QA: YES
DIFFERENTIAL METHOD BLD: ABNORMAL
EGFR (NO RACE VARIABLE) (RUSH/TITUS): 128 ML/MIN/1.73M²
EOSINOPHIL # BLD AUTO: 0.03 K/UL (ref 0–0.5)
EOSINOPHIL NFR BLD AUTO: 0.3 % (ref 1–4)
ERYTHROCYTE [DISTWIDTH] IN BLOOD BY AUTOMATED COUNT: 13.5 % (ref 11.5–14.5)
GLOBULIN SER-MCNC: 4 G/DL (ref 2–4)
GLUCOSE SERPL-MCNC: 87 MG/DL (ref 74–106)
GLUCOSE UR STRIP-MCNC: NORMAL MG/DL
HCT VFR BLD AUTO: 38.8 % (ref 38–47)
HGB BLD-MCNC: 12.5 G/DL (ref 12–16)
IMM GRANULOCYTES # BLD AUTO: 0.02 K/UL (ref 0–0.04)
IMM GRANULOCYTES NFR BLD: 0.2 % (ref 0–0.4)
KETONES UR STRIP-SCNC: NEGATIVE MG/DL
LEUKOCYTE ESTERASE UR QL STRIP: ABNORMAL
LIPASE SERPL-CCNC: 98 U/L (ref 73–393)
LYMPHOCYTES # BLD AUTO: 0.99 K/UL (ref 1–4.8)
LYMPHOCYTES NFR BLD AUTO: 10.9 % (ref 27–41)
MCH RBC QN AUTO: 28.2 PG (ref 27–31)
MCHC RBC AUTO-ENTMCNC: 32.2 G/DL (ref 32–36)
MCV RBC AUTO: 87.6 FL (ref 80–96)
MONOCYTES # BLD AUTO: 0.51 K/UL (ref 0–0.8)
MONOCYTES NFR BLD AUTO: 5.6 % (ref 2–6)
MPC BLD CALC-MCNC: 12.1 FL (ref 9.4–12.4)
MUCOUS THREADS #/AREA URNS HPF: ABNORMAL /HPF
NEUTROPHILS # BLD AUTO: 7.52 K/UL (ref 1.8–7.7)
NEUTROPHILS NFR BLD AUTO: 82.8 % (ref 53–65)
NITRITE UR QL STRIP: NEGATIVE
NRBC # BLD AUTO: 0 X10E3/UL
NRBC, AUTO (.00): 0 %
PH UR STRIP: 7.5 PH UNITS
PLATELET # BLD AUTO: 185 K/UL (ref 150–400)
POTASSIUM SERPL-SCNC: 3.6 MMOL/L (ref 3.5–5.1)
PROT SERPL-MCNC: 7.5 G/DL (ref 6.4–8.2)
PROT UR QL STRIP: NEGATIVE
RBC # BLD AUTO: 4.43 M/UL (ref 4.2–5.4)
RBC # UR STRIP: NEGATIVE /UL
RBC #/AREA URNS HPF: ABNORMAL /HPF
SODIUM SERPL-SCNC: 138 MMOL/L (ref 136–145)
SP GR UR STRIP: 1.02
SQUAMOUS #/AREA URNS LPF: ABNORMAL /LPF
TRICHOMONAS #/AREA URNS HPF: ABNORMAL /HPF
UROBILINOGEN UR STRIP-ACNC: NORMAL MG/DL
WBC # BLD AUTO: 9.09 K/UL (ref 4.5–11)
WBC #/AREA URNS HPF: ABNORMAL /HPF
YEAST #/AREA URNS HPF: ABNORMAL /HPF

## 2023-01-30 PROCEDURE — 80053 COMPREHEN METABOLIC PANEL: CPT | Performed by: NURSE PRACTITIONER

## 2023-01-30 PROCEDURE — 83690 ASSAY OF LIPASE: CPT | Performed by: NURSE PRACTITIONER

## 2023-01-30 PROCEDURE — 99283 PR EMERGENCY DEPT VISIT,LEVEL III: ICD-10-PCS | Mod: ,,, | Performed by: NURSE PRACTITIONER

## 2023-01-30 PROCEDURE — 99284 EMERGENCY DEPT VISIT MOD MDM: CPT

## 2023-01-30 PROCEDURE — 85025 COMPLETE CBC W/AUTO DIFF WBC: CPT | Performed by: NURSE PRACTITIONER

## 2023-01-30 PROCEDURE — 81025 URINE PREGNANCY TEST: CPT | Performed by: NURSE PRACTITIONER

## 2023-01-30 PROCEDURE — 99283 EMERGENCY DEPT VISIT LOW MDM: CPT | Mod: ,,, | Performed by: NURSE PRACTITIONER

## 2023-01-30 PROCEDURE — 81001 URINALYSIS AUTO W/SCOPE: CPT | Performed by: NURSE PRACTITIONER

## 2023-01-30 NOTE — PROVIDER PROGRESS NOTES - EMERGENCY DEPT.
Encounter Date: 1/30/2023    ED Physician Progress Notes        MDM  22-year-old female presents to the emergency department to be evaluated for abdominal pain that began 6 hours ago.  She describes her pain as being generalized, crampy and intermittent.  She has had some mild nausea.  Denies any vomiting.  Her nausea has subsided.  She reports that she had a large loose stool this morning.  Denies any abdominal pain at this time.  Denies vaginal discharge, dysuria, fever, chills.  I ordered labs and personally reviewed them.   I ordered X-rays and personally reviewed them and reviewed the radiologist interpretation.  Xray significant for no acute process.    Diagnosis is abdominal pain, gastroenteritis.  Patient was discharged in stable condition.  Detailed return precautions discussed.

## 2023-01-30 NOTE — ED PROVIDER NOTES
Encounter Date: 1/30/2023       History     Chief Complaint   Patient presents with    Abdominal Pain     22-year-old female presents to the emergency department to be evaluated for abdominal pain that began 6 hours ago.  She describes her pain as being generalized, crampy and intermittent.  She has had some mild nausea.  Denies any vomiting.  Her nausea has subsided.  She reports that she had a large loose stool this morning.  Denies any abdominal pain at this time.  Denies vaginal discharge, dysuria, fever, chills.    The history is provided by the patient.   Abdominal Pain  The current episode started several days ago. The abdominal pain is generalized. The other symptoms of the illness include nausea. The other symptoms of the illness do not include fever, fatigue, jaundice, melena, vomiting, dysuria, hematemesis, hematochezia, vaginal discharge or vaginal bleeding.   Review of patient's allergies indicates:  No Known Allergies  Past Medical History:   Diagnosis Date    Menorrhagia with irregular cycle 5/12/2022     No past surgical history on file.  No family history on file.  Social History     Tobacco Use    Smoking status: Never    Smokeless tobacco: Never   Substance Use Topics    Alcohol use: Never    Drug use: Never     Review of Systems   Constitutional:  Negative for fatigue and fever.   Gastrointestinal:  Positive for abdominal pain and nausea. Negative for hematemesis, hematochezia, jaundice, melena and vomiting.   Genitourinary:  Negative for dysuria, vaginal bleeding and vaginal discharge.   All other systems reviewed and are negative.    Physical Exam     Initial Vitals [01/30/23 0937]   BP Pulse Resp Temp SpO2   134/69 98 18 97.7 °F (36.5 °C) 100 %      MAP       --         Physical Exam    Vitals reviewed.  Constitutional: She appears well-developed and well-nourished.   Neck: Neck supple.   Cardiovascular:  Normal rate and regular rhythm.           Pulmonary/Chest: Breath sounds normal.    Abdominal: Abdomen is soft. Bowel sounds are normal. She exhibits no distension and no mass. There is no abdominal tenderness. There is no rebound and no guarding.   Musculoskeletal:         General: Normal range of motion.      Cervical back: Neck supple.     Neurological: She is alert and oriented to person, place, and time. She has normal strength. GCS score is 15. GCS eye subscore is 4. GCS verbal subscore is 5. GCS motor subscore is 6.   Skin: Skin is warm and dry. Capillary refill takes less than 2 seconds.   Psychiatric: She has a normal mood and affect.       Medical Screening Exam   See Full Note    ED Course   Procedures  Labs Reviewed   COMPREHENSIVE METABOLIC PANEL - Abnormal; Notable for the following components:       Result Value    Calcium 8.4 (*)     Alk Phos 110 (*)     AST 14 (*)     All other components within normal limits   URINALYSIS - Abnormal; Notable for the following components:    Leukocytes, UA Moderate (*)     All other components within normal limits   CBC WITH DIFFERENTIAL - Abnormal; Notable for the following components:    Neutrophils % 82.8 (*)     Lymphocytes % 10.9 (*)     Eosinophils % 0.3 (*)     Lymphocytes, Absolute 0.99 (*)     All other components within normal limits   URINALYSIS, MICROSCOPIC - Abnormal; Notable for the following components:    Bacteria, UA Few (*)     Squamous Epithelial Cells, UA Few (*)     Mucus, UA Rare (*)     All other components within normal limits   LIPASE - Normal   CBC W/ AUTO DIFFERENTIAL    Narrative:     The following orders were created for panel order CBC auto differential.  Procedure                               Abnormality         Status                     ---------                               -----------         ------                     CBC with Differential[[id number]]        Abnormal            Final result                 Please view results for these tests on the individual orders.   POCT URINE PREGNANCY          Imaging  Results              XR ABDOMEN, ACUTE 2 OR MORE VIEWS WITH CHEST (Final result)  Result time 01/30/23 10:12:45      Final result by Bruno Ashley II, MD (01/30/23 10:12:45)                   Impression:      No evidence of abnormality demonstrated      Electronically signed by: Bruno Ashley  Date:    01/30/2023  Time:    10:12               Narrative:    EXAMINATION:  XR ABDOMEN ACUTE 2 OR MORE VIEWS WITH CHEST    CLINICAL HISTORY:  Abdominal pain abdominal pain;    COMPARISON:  28 May 2018    FINDINGS:  No free fluid or free air seen.  The bowel gas pattern appears within normal limits.  No abnormal calcifications are present. Chest shows no evidence of abnormality. No other abnormality is identified.                                       Medications - No data to display                    Clinical Impression:   Final diagnoses:  [R10.9] Abdominal pain, unspecified abdominal location (Primary)  [K52.9] Gastroenteritis        ED Disposition Condition    Discharge Stable          ED Prescriptions    None       Follow-up Information    None          MONTANA Rosales  01/30/23 6266

## 2023-01-31 ENCOUNTER — TELEPHONE (OUTPATIENT)
Dept: EMERGENCY MEDICINE | Facility: HOSPITAL | Age: 23
End: 2023-01-31
Payer: MEDICAID

## 2023-03-20 ENCOUNTER — HOSPITAL ENCOUNTER (EMERGENCY)
Facility: HOSPITAL | Age: 23
Discharge: HOME | End: 2023-03-20
Payer: MEDICAID

## 2023-03-20 VITALS
WEIGHT: 219.5 LBS | OXYGEN SATURATION: 99 % | SYSTOLIC BLOOD PRESSURE: 118 MMHG | TEMPERATURE: 99 F | DIASTOLIC BLOOD PRESSURE: 74 MMHG | HEART RATE: 76 BPM | HEIGHT: 68 IN | BODY MASS INDEX: 33.27 KG/M2 | RESPIRATION RATE: 18 BRPM

## 2023-03-20 DIAGNOSIS — K08.89 PAIN, DENTAL: Primary | ICD-10-CM

## 2023-03-20 PROCEDURE — 99281 EMR DPT VST MAYX REQ PHY/QHP: CPT

## 2023-03-20 PROCEDURE — 99283 EMERGENCY DEPT VISIT LOW MDM: CPT | Mod: ,,, | Performed by: NURSE PRACTITIONER

## 2023-03-20 PROCEDURE — 99283 PR EMERGENCY DEPT VISIT,LEVEL III: ICD-10-PCS | Mod: ,,, | Performed by: NURSE PRACTITIONER

## 2023-03-20 RX ORDER — KETOROLAC TROMETHAMINE 30 MG/ML
30 INJECTION, SOLUTION INTRAMUSCULAR; INTRAVENOUS
Status: DISCONTINUED | OUTPATIENT
Start: 2023-03-20 | End: 2023-03-21 | Stop reason: HOSPADM

## 2023-03-21 NOTE — ED PROVIDER NOTES
Encounter Date: 3/20/2023       History     Chief Complaint   Patient presents with    Dental Pain     C/o left bottom side dental pain.  supposed to have wisdom teeth cut out on 3/27/23      24 y/o AAF presents with c/o left side jaw pain. Reports known issue with wisdom teeth, was placed on abx last week and instructed to take tylenol and ibuprofen and complete antibiotics.  has appt for 03/27 for wisdom teeth removal. When questioned pt  has only been taking 100-200mg of ibuprofen at a time, once in the morning and once at night. She denies fevers, chills, nausea, vomiting.      The history is provided by the patient.   Review of patient's allergies indicates:  No Known Allergies  Past Medical History:   Diagnosis Date    Menorrhagia with irregular cycle 5/12/2022     History reviewed. No pertinent surgical history.  History reviewed. No pertinent family history.  Social History     Tobacco Use    Smoking status: Never    Smokeless tobacco: Never   Substance Use Topics    Alcohol use: Never    Drug use: Never     Review of Systems   Constitutional:  Negative for appetite change, chills and fever.   HENT:  Positive for dental problem.    All other systems reviewed and are negative.    Physical Exam     Initial Vitals [03/20/23 2215]   BP Pulse Resp Temp SpO2   118/74 76 18 98.9 °F (37.2 °C) 99 %      MAP       --         Physical Exam    Constitutional: She appears well-developed and well-nourished. She is cooperative.   HENT:   Mouth/Throat: Oropharynx is clear and moist and mucous membranes are normal. Dental caries present. No dental abscesses.   Cardiovascular:  Normal rate, regular rhythm, normal heart sounds and normal pulses.           Pulmonary/Chest: Effort normal and breath sounds normal.   Abdominal: Abdomen is soft. Bowel sounds are normal. There is no abdominal tenderness.     Neurological: She is alert and oriented to person, place, and time.   Skin: Skin is warm, dry and intact.  Capillary refill takes less than 2 seconds.   Psychiatric: She has a normal mood and affect. Her speech is normal and behavior is normal. Judgment and thought content normal. Cognition and memory are normal.       Medical Screening Exam   See Full Note    ED Course   Procedures  Labs Reviewed - No data to display       Imaging Results    None          Medications   ketorolac injection 30 mg (has no administration in time range)     Medical Decision Making:   Initial Assessment:   22 y/o AAF presents with c/o left side jaw pain. Reports known issue with wisdom teeth, was placed on abx last week and instructed to take tylenol and ibuprofen and complete antibiotics.  has appt for 03/27 for wisdom teeth removal. When questioned pt  has only been taking 100-200mg of ibuprofen at a time, once in the morning and once at night. She denies fevers, chills, nausea, vomiting.   Differential Diagnosis:   Dental pain  Lismore teeth eruption  Dental abscess  Vs other  ED Management:  IM toradol given. Counseled on supportive measures and tylenol and ibuprofen dosing. Strict f/u instructions given. Warning s/s discussed and return precautions given; the patient has v/u.                   Clinical Impression:   Final diagnoses:  [K08.89] Pain, dental (Primary)        ED Disposition Condition    Discharge Stable          ED Prescriptions    None       Follow-up Information       Follow up With Specialties Details Why Contact Info    Dentist   as previously directed              MONTANA Luke  03/20/23 4066

## 2023-03-21 NOTE — DISCHARGE INSTRUCTIONS
Alternate tylenol and ibuprofen as directed as needed for pain. Keep your appointment with your dentist or oral surgeon as previously directed or sooner if no improvement. Return to the ED for worsening signs and symptoms or otherwise as needed.

## 2023-04-11 ENCOUNTER — PATIENT MESSAGE (OUTPATIENT)
Dept: RESEARCH | Facility: HOSPITAL | Age: 23
End: 2023-04-11

## 2023-06-27 ENCOUNTER — PATIENT MESSAGE (OUTPATIENT)
Dept: RESEARCH | Facility: HOSPITAL | Age: 23
End: 2023-06-27

## 2023-07-05 ENCOUNTER — PATIENT MESSAGE (OUTPATIENT)
Dept: RESEARCH | Facility: HOSPITAL | Age: 23
End: 2023-07-05

## 2023-07-11 ENCOUNTER — PATIENT MESSAGE (OUTPATIENT)
Dept: RESEARCH | Facility: HOSPITAL | Age: 23
End: 2023-07-11

## 2024-01-20 ENCOUNTER — HOSPITAL ENCOUNTER (EMERGENCY)
Facility: HOSPITAL | Age: 24
Discharge: LEFT BEFORE DISCHARGE | End: 2024-01-20
Payer: MEDICAID

## 2024-01-20 VITALS
HEART RATE: 85 BPM | WEIGHT: 195.5 LBS | TEMPERATURE: 99 F | HEIGHT: 68 IN | OXYGEN SATURATION: 100 % | BODY MASS INDEX: 29.63 KG/M2 | DIASTOLIC BLOOD PRESSURE: 73 MMHG | RESPIRATION RATE: 16 BRPM | SYSTOLIC BLOOD PRESSURE: 120 MMHG

## 2024-01-20 DIAGNOSIS — Z34.90 PREGNANCY, UNSPECIFIED GESTATIONAL AGE: Primary | ICD-10-CM

## 2024-01-20 LAB
ALBUMIN SERPL BCP-MCNC: 3.4 G/DL (ref 3.5–5)
ALBUMIN/GLOB SERPL: 0.8 {RATIO}
ALP SERPL-CCNC: 72 U/L (ref 37–98)
ALT SERPL W P-5'-P-CCNC: 8 U/L (ref 13–56)
ANION GAP SERPL CALCULATED.3IONS-SCNC: 7 MMOL/L (ref 7–16)
AST SERPL W P-5'-P-CCNC: 10 U/L (ref 15–37)
B-HCG UR QL: POSITIVE
BACTERIA #/AREA URNS HPF: ABNORMAL /HPF
BASOPHILS # BLD AUTO: 0.03 K/UL (ref 0–0.2)
BASOPHILS NFR BLD AUTO: 0.4 % (ref 0–1)
BILIRUB SERPL-MCNC: 0.4 MG/DL (ref ?–1.2)
BILIRUB UR QL STRIP: NEGATIVE
BUN SERPL-MCNC: 7 MG/DL (ref 7–18)
BUN/CREAT SERPL: 13 (ref 6–20)
CALCIUM SERPL-MCNC: 8.6 MG/DL (ref 8.5–10.1)
CHLORIDE SERPL-SCNC: 109 MMOL/L (ref 98–107)
CLARITY UR: CLEAR
CO2 SERPL-SCNC: 26 MMOL/L (ref 21–32)
COLOR UR: ABNORMAL
CREAT SERPL-MCNC: 0.56 MG/DL (ref 0.55–1.02)
CTP QC/QA: YES
DIFFERENTIAL METHOD BLD: ABNORMAL
EGFR (NO RACE VARIABLE) (RUSH/TITUS): 131 ML/MIN/1.73M2
EOSINOPHIL # BLD AUTO: 0.02 K/UL (ref 0–0.5)
EOSINOPHIL NFR BLD AUTO: 0.3 % (ref 1–4)
ERYTHROCYTE [DISTWIDTH] IN BLOOD BY AUTOMATED COUNT: 16.6 % (ref 11.5–14.5)
GLOBULIN SER-MCNC: 4.2 G/DL (ref 2–4)
GLUCOSE SERPL-MCNC: 86 MG/DL (ref 74–106)
GLUCOSE UR STRIP-MCNC: NORMAL MG/DL
HCG SERUM QUALITATIVE: POSITIVE
HCT VFR BLD AUTO: 32.6 % (ref 38–47)
HGB BLD-MCNC: 10.3 G/DL (ref 12–16)
IMM GRANULOCYTES # BLD AUTO: 0.01 K/UL (ref 0–0.04)
IMM GRANULOCYTES NFR BLD: 0.1 % (ref 0–0.4)
KETONES UR STRIP-SCNC: 60 MG/DL
LEUKOCYTE ESTERASE UR QL STRIP: ABNORMAL
LIPASE SERPL-CCNC: 30 U/L (ref 16–77)
LYMPHOCYTES # BLD AUTO: 2.01 K/UL (ref 1–4.8)
LYMPHOCYTES NFR BLD AUTO: 27.5 % (ref 27–41)
MCH RBC QN AUTO: 25.7 PG (ref 27–31)
MCHC RBC AUTO-ENTMCNC: 31.6 G/DL (ref 32–36)
MCV RBC AUTO: 81.3 FL (ref 80–96)
MONOCYTES # BLD AUTO: 0.41 K/UL (ref 0–0.8)
MONOCYTES NFR BLD AUTO: 5.6 % (ref 2–6)
MPC BLD CALC-MCNC: 13 FL (ref 9.4–12.4)
MUCOUS, UA: ABNORMAL /LPF
NEUTROPHILS # BLD AUTO: 4.83 K/UL (ref 1.8–7.7)
NEUTROPHILS NFR BLD AUTO: 66.1 % (ref 53–65)
NITRITE UR QL STRIP: NEGATIVE
NRBC # BLD AUTO: 0 X10E3/UL
NRBC, AUTO (.00): 0 %
PH UR STRIP: 6.5 PH UNITS
PLATELET # BLD AUTO: 201 K/UL (ref 150–400)
POTASSIUM SERPL-SCNC: 3.2 MMOL/L (ref 3.5–5.1)
PROT SERPL-MCNC: 7.6 G/DL (ref 6.4–8.2)
PROT UR QL STRIP: 20
RBC # BLD AUTO: 4.01 M/UL (ref 4.2–5.4)
RBC # UR STRIP: NEGATIVE /UL
RBC #/AREA URNS HPF: 1 /HPF
SODIUM SERPL-SCNC: 139 MMOL/L (ref 136–145)
SP GR UR STRIP: 1.03
SQUAMOUS #/AREA URNS LPF: ABNORMAL /HPF
UROBILINOGEN UR STRIP-ACNC: NORMAL MG/DL
WBC # BLD AUTO: 7.31 K/UL (ref 4.5–11)
WBC #/AREA URNS HPF: 1 /HPF

## 2024-01-20 PROCEDURE — 85025 COMPLETE CBC W/AUTO DIFF WBC: CPT | Performed by: REGISTERED NURSE

## 2024-01-20 PROCEDURE — 99284 EMERGENCY DEPT VISIT MOD MDM: CPT | Mod: ,,, | Performed by: REGISTERED NURSE

## 2024-01-20 PROCEDURE — 81003 URINALYSIS AUTO W/O SCOPE: CPT | Performed by: REGISTERED NURSE

## 2024-01-20 PROCEDURE — 81025 URINE PREGNANCY TEST: CPT | Performed by: REGISTERED NURSE

## 2024-01-20 PROCEDURE — 99284 EMERGENCY DEPT VISIT MOD MDM: CPT | Mod: 25

## 2024-01-20 PROCEDURE — 80053 COMPREHEN METABOLIC PANEL: CPT | Performed by: REGISTERED NURSE

## 2024-01-20 PROCEDURE — 83690 ASSAY OF LIPASE: CPT | Performed by: REGISTERED NURSE

## 2024-01-20 PROCEDURE — 84703 CHORIONIC GONADOTROPIN ASSAY: CPT | Performed by: REGISTERED NURSE

## 2024-01-20 RX ORDER — KETOROLAC TROMETHAMINE 30 MG/ML
60 INJECTION, SOLUTION INTRAMUSCULAR; INTRAVENOUS
Status: DISCONTINUED | OUTPATIENT
Start: 2024-01-20 | End: 2024-01-20

## 2024-01-20 NOTE — PROVIDER PROGRESS NOTES - EMERGENCY DEPT.
Encounter Date: 1/20/2024    ED Physician Progress Notes        Physician Note:   Of ongoing the patient's room after ultrasound was completed the patient was not found in room or in lobby.  Several attempts made to call listed phone numbers without success.  RN has spoke with the patient and informed her she was pregnant.  Referral sent to Ob/gyn on-call Rodney for prenatal care

## 2024-01-20 NOTE — ED PROVIDER NOTES
Encounter Date: 1/20/2024       History     Chief Complaint   Patient presents with    Abdominal Cramping     23-year-old female who presents to the emergency room today complaining of left lower abdominal cramping.  Onset of symptoms was 2 days ago.  No reported nausea vomiting, diarrhea, fever, constipation or vaginal discharge.  LMP around 12/20/2023.  Reports having influenza 1 week prior.      Review of patient's allergies indicates:  No Known Allergies  Past Medical History:   Diagnosis Date    Menorrhagia with irregular cycle 5/12/2022     No past surgical history on file.  No family history on file.  Social History     Tobacco Use    Smoking status: Never    Smokeless tobacco: Never   Substance Use Topics    Alcohol use: Never    Drug use: Never     Review of Systems   Constitutional:  Negative for activity change.   Respiratory:  Negative for shortness of breath.    Gastrointestinal:  Positive for abdominal pain and nausea. Negative for constipation, diarrhea and vomiting.   Genitourinary:  Negative for pelvic pain, vaginal bleeding and vaginal discharge.   All other systems reviewed and are negative.  Lab work    Physical Exam     Initial Vitals [01/20/24 1325]   BP Pulse Resp Temp SpO2   120/73 85 16 98.8 °F (37.1 °C) 100 %      MAP       --         Physical Exam    Constitutional: She appears well-developed and well-nourished. She is not diaphoretic.   HENT:   Head: Normocephalic and atraumatic.   Eyes: EOM are normal.   Neck: Neck supple.   Normal range of motion.  Cardiovascular:  Normal rate, regular rhythm, normal heart sounds and intact distal pulses.           Abdominal: Abdomen is soft. Bowel sounds are normal. She exhibits no distension. There is abdominal tenderness.   Left lower quadrant pain near pelvic region with a tenderness on palpation   Genitourinary:    No vaginal discharge.     Musculoskeletal:         General: No edema. Normal range of motion.      Cervical back: Normal range of  motion and neck supple.     Neurological: She is alert and oriented to person, place, and time. No cranial nerve deficit or sensory deficit. GCS score is 15. GCS eye subscore is 4. GCS verbal subscore is 5. GCS motor subscore is 6.   Skin: Skin is warm. Capillary refill takes less than 2 seconds.   Psychiatric: She has a normal mood and affect. Thought content normal.         Medical Screening Exam   See Full Note    ED Course   Procedures  Labs Reviewed   COMPREHENSIVE METABOLIC PANEL - Abnormal; Notable for the following components:       Result Value    Potassium 3.2 (*)     Chloride 109 (*)     Albumin 3.4 (*)     Globulin 4.2 (*)     ALT 8 (*)     AST 10 (*)     All other components within normal limits   URINALYSIS - Abnormal; Notable for the following components:    Leukocytes, UA Moderate (*)     Protein, UA 20 (*)     Ketones, UA 60 (*)     All other components within normal limits   CBC WITH DIFFERENTIAL - Abnormal; Notable for the following components:    RBC 4.01 (*)     Hemoglobin 10.3 (*)     Hematocrit 32.6 (*)     MCH 25.7 (*)     MCHC 31.6 (*)     RDW 16.6 (*)     MPV 13.0 (*)     Neutrophils % 66.1 (*)     Eosinophils % 0.3 (*)     All other components within normal limits   HCG, SERUM, QUALITATIVE - Abnormal; Notable for the following components:    Pregnancy, Serum Positive (*)     All other components within normal limits   URINALYSIS, MICROSCOPIC - Abnormal; Notable for the following components:    Bacteria, UA Few (*)     Squamous Epithelial Cells, UA Few (*)     Mucous Moderate (*)     All other components within normal limits   POCT URINE PREGNANCY - Abnormal; Notable for the following components:    POC Preg Test, Ur Positive (*)     All other components within normal limits   LIPASE - Normal   CBC W/ AUTO DIFFERENTIAL    Narrative:     The following orders were created for panel order CBC auto differential.  Procedure                               Abnormality         Status                      ---------                               -----------         ------                     CBC with Differential[[id number]]       Abnormal            Final result                 Please view results for these tests on the individual orders.          Imaging Results              US OB <14 Wks, TransAbd, Single Gestation (Final result)  Result time 01/20/24 15:26:34      Final result by Arron Basurto MD (01/20/24 15:26:34)                   Impression:      Simple left ovarian cyst measuring 2 cm with no complex features are worrisome finding.  No other abnormality identified.      Electronically signed by: Arron Basurto  Date:    01/20/2024  Time:    15:26               Narrative:    EXAMINATION:  US OB <14 WEEKS TRANSABDOM, SINGLE GESTATION    CLINICAL HISTORY:  Lower left quadrant abdominal pain;    TECHNIQUE:  Transabdominal grayscale and color Doppler ultrasound performed of the maternal uterus and adnexa.  Real-time ultrasound images captured and stored.    COMPARISON:  None    FINDINGS:  Uterus is unremarkable measuring 10 x 6 x 6.5 cm.  Endometrial stripe 0.9 cm.  There is a simple cyst of the left ovary 2 cm.  Ovaries otherwise are unremarkable.  No free fluid.                                       Medications - No data to display  Medical Decision Making  Amount and/or Complexity of Data Reviewed  Labs: ordered.  Radiology: ordered.                                      Clinical Impression:   Final diagnoses:  [Z34.90] Pregnancy, unspecified gestational age (Primary)        ED Disposition Condition    Eloped Stable                Deshaun Dias, HAILEE  01/20/24 7031

## 2024-01-20 NOTE — ED TRIAGE NOTES
Abdominal cramping that started 2 days ago. Denies any other c/o at this time. LMP was 12/20/2023.

## 2024-01-20 NOTE — ED NOTES
Pt. Not in room at this time.  RN checked lobby and all areas of ER and patient not located.  RN attempted to contact patient by phone and emergency contact number, but patient did not answer and phone number was not in service.  Chao BOYLE notified at this time that patient eloped.     DC instructions

## 2024-01-22 ENCOUNTER — TELEPHONE (OUTPATIENT)
Dept: EMERGENCY MEDICINE | Facility: HOSPITAL | Age: 24
End: 2024-01-22
Payer: MEDICAID

## 2024-01-22 NOTE — ED NOTES
01/22/2024 @ 1102  s/w [facility] referral department to let them know pt has a appt with KAILEE Davey CNM tomorrow, Jan. 23 @ 8:30 am.

## 2024-01-22 NOTE — ED NOTES
RN pulled order for Ketorolac 60mg IM and lorri up in syringe ready to give.  Pt. Pregnancy test resulted positive.  RN given verbal orders from Chao BOYLE to not give medication because pt. Is pregnant.  RN wasted medication appropriately at this time because medication had already been drawn up and could not be returned.

## 2024-01-23 ENCOUNTER — OFFICE VISIT (OUTPATIENT)
Dept: OBSTETRICS AND GYNECOLOGY | Facility: CLINIC | Age: 24
End: 2024-01-23
Payer: MEDICAID

## 2024-01-23 VITALS
SYSTOLIC BLOOD PRESSURE: 113 MMHG | OXYGEN SATURATION: 99 % | BODY MASS INDEX: 31.47 KG/M2 | HEART RATE: 79 BPM | WEIGHT: 195.81 LBS | HEIGHT: 66 IN | RESPIRATION RATE: 18 BRPM | DIASTOLIC BLOOD PRESSURE: 76 MMHG | TEMPERATURE: 99 F

## 2024-01-23 DIAGNOSIS — N91.1 SECONDARY AMENORRHEA: Primary | ICD-10-CM

## 2024-01-23 DIAGNOSIS — N83.209 CYST OF OVARY, UNSPECIFIED LATERALITY: ICD-10-CM

## 2024-01-23 DIAGNOSIS — R11.0 NAUSEA: ICD-10-CM

## 2024-01-23 DIAGNOSIS — Z32.01 POSITIVE PREGNANCY TEST: ICD-10-CM

## 2024-01-23 PROCEDURE — 3078F DIAST BP <80 MM HG: CPT | Mod: CPTII,,, | Performed by: ADVANCED PRACTICE MIDWIFE

## 2024-01-23 PROCEDURE — 3074F SYST BP LT 130 MM HG: CPT | Mod: CPTII,,, | Performed by: ADVANCED PRACTICE MIDWIFE

## 2024-01-23 PROCEDURE — 3008F BODY MASS INDEX DOCD: CPT | Mod: CPTII,,, | Performed by: ADVANCED PRACTICE MIDWIFE

## 2024-01-23 PROCEDURE — 99204 OFFICE O/P NEW MOD 45 MIN: CPT | Mod: ,,, | Performed by: ADVANCED PRACTICE MIDWIFE

## 2024-01-23 PROCEDURE — 1159F MED LIST DOCD IN RCRD: CPT | Mod: CPTII,,, | Performed by: ADVANCED PRACTICE MIDWIFE

## 2024-01-23 RX ORDER — ERGOCALCIFEROL 1.25 MG/1
50000 CAPSULE ORAL
Qty: 5 CAPSULE | Refills: 3 | Status: SHIPPED | OUTPATIENT
Start: 2024-01-23 | End: 2024-02-21

## 2024-01-23 RX ORDER — PROMETHAZINE HYDROCHLORIDE 25 MG/1
25 TABLET ORAL EVERY 4 HOURS
Qty: 30 TABLET | Refills: 2 | Status: SHIPPED | OUTPATIENT
Start: 2024-01-23 | End: 2024-01-28

## 2024-01-23 NOTE — LETTER
January 23, 2024    Nida Gold  [address] [place] [address]             [facility]  [address] [facility] [place] [address]  Phone: [phone]  Fax: [phone] 01/23/2024     Nida Gold   2000       Nida Gold is currently pregnant and her due date is Estimated Date of Delivery: 9/25/24.    Sincerely,          Maria Elena Ag LPN for Chula Davey, DNP, CNM, WHNP-BC  Doctor of Nursing Practice, Certified Nurse Midwife, Women's Health Nurse Practitioner

## 2024-01-23 NOTE — PROGRESS NOTES
Missed Menses/ Possible Pregnancy  Patient complains of amenorrhea. She believes she could be pregnant. Pregnancy is desired. Sexual Activity: has sex with males. Current symptoms also include: breast tenderness, fatigue, nausea, positive home pregnancy test, and nipple tenderness . Last period was normal.     Patient's last menstrual period was 2023 (exact date).     Review of patient's allergies indicates:  No Known Allergies    Past Medical History:   Diagnosis Date    Menorrhagia with irregular cycle 2022     History reviewed. No pertinent surgical history.  OB History          3    Para   2    Term   2            AB        Living   2         SAB        IAB        Ectopic        Multiple        Live Births   2               Family History   Problem Relation Age of Onset    Hypertension Mother      Social History     Tobacco Use    Smoking status: Never     Passive exposure: Never    Smokeless tobacco: Never   Substance Use Topics    Alcohol use: Never    Drug use: Never     Genetic History reviewed    Current Outpatient Medications   Medication Sig    albuterol (VENTOLIN HFA) 90 mcg/actuation inhaler Inhale 2 puffs into the lungs every 6 (six) hours as needed for Wheezing. Rescue (Patient not taking: Reported on 2022)    ergocalciferol (VITAMIN D2) 50,000 unit Cap Take 1 capsule (50,000 Units total) by mouth every 7 days. for 5 doses    promethazine (PHENERGAN) 25 MG tablet Take 1 tablet (25 mg total) by mouth every 4 (four) hours. for 30 doses     No current facility-administered medications for this visit.       OB History    Para Term  AB Living   3 2 2     2   SAB IAB Ectopic Multiple Live Births           2      # Outcome Date GA Lbr Montez/2nd Weight Sex Delivery Anes PTL Lv   3 Current            2 Term 10/01/19 39w0d  3.175 kg (7 lb) F Vag-Spont EPI N SAMY   1 Term 16 41w0d  3.6 kg (7 lb 15 oz) F Vag-Spont EPI  SAMY        Review of Systems  ROS:  GENERAL: No  fever, chills, fatigability or weight loss.  VULVAR: No pain, no lesions and no itching.  VAGINAL: No relaxation, no itching, no discharge, no abnormal bleeding and no lesions.  ABDOMEN: No abdominal pain. Denies nausea. Denies vomiting. No diarrhea. No constipation  BREAST: Denies pain. No lumps. No discharge.  URINARY: No incontinence, no nocturia, no frequency and no dysuria.  CARDIOVASCULAR: No chest pain. No shortness of breath. No leg cramps.  NEUROLOGICAL: no headaches. No vision changes.    Objective:     PE:  AFFECT: Calm, alert and oriented X 3. Interactive during exam  GENERAL: Appears well-nourished, well-developed, in no acute distress.  HEAD: Normocephalic, atruamatic  TEETH: Good dentition.  THYROID: No thyromegally   BREASTS: No masses, skin changes, nipple discharge or adenopathy bilaterally.  SKIN: Normal for race, warm, & dry. No lesions or rashes.  LUNGS: Easy and unlabored, clear to auscultation bilaterally.  HEART: Regular rate and rhythm   EXTREMITIES: No cyanosis, clubbing or edema. No calf tenderness.    Pregnancy test: positive on 1/2024  EDC: 9/25/2024 per LMP     Assessment:     Nida was seen today for establish pregnancy.    Diagnoses and all orders for this visit:    Secondary amenorrhea  -     Cancel: POCT URINE PREGNANCY    Positive pregnancy test    Nausea  -     ergocalciferol (VITAMIN D2) 50,000 unit Cap; Take 1 capsule (50,000 Units total) by mouth every 7 days. for 5 doses  -     promethazine (PHENERGAN) 25 MG tablet; Take 1 tablet (25 mg total) by mouth every 4 (four) hours. for 30 doses    Cyst of ovary, unspecified laterality        ICD-10-CM ICD-9-CM    1. Secondary amenorrhea  N91.1 626.0 CANCELED: POCT URINE PREGNANCY      2. Positive pregnancy test  Z32.01 V72.42       3. Nausea  R11.0 787.02 ergocalciferol (VITAMIN D2) 50,000 unit Cap      promethazine (PHENERGAN) 25 MG tablet      4. Cyst of ovary, unspecified laterality  N83.209 620.2            Plan:     Oriented to  practice  Bleeding precautions discussed. If noted, follow up a the emergency room or clinic if scheduled for evaluation.  Counseled to avoid cat litter boxes.  Avoid gardening without gloves  Avoid half cooked, rare, or raw meats.  Avoid foods high in nitrites such as cold cuts- heat up any deli meats.  Seafood no more than 3 times per week or may eat large fish like tuna no more than once a week.  Avoid soft unpasteurized cheeses.  I recommend a PNV daily.    She should avoid ibuprofen, aleve, advil, BC powders.  Pink OB bag with prenatal book and information provided  Weight gain in pregnancy discussed  Vitamin D daily  Prenatal vitamins daily  Verbalized understanding to all instructions and information  Questions answered to desired level of satisfaction      Follow up in about 1 week (around 1/30/2024), or if symptoms worsen or fail to improve, for 3 wks for ultrasound and 4 weeks for OB annual.    Chula Davey, DNP, CNM, WHNP-BC

## 2024-01-29 ENCOUNTER — OFFICE VISIT (OUTPATIENT)
Dept: OBSTETRICS AND GYNECOLOGY | Facility: CLINIC | Age: 24
End: 2024-01-29
Payer: MEDICAID

## 2024-01-29 VITALS
DIASTOLIC BLOOD PRESSURE: 79 MMHG | SYSTOLIC BLOOD PRESSURE: 137 MMHG | HEIGHT: 66 IN | TEMPERATURE: 99 F | RESPIRATION RATE: 18 BRPM | OXYGEN SATURATION: 99 % | BODY MASS INDEX: 32.01 KG/M2 | WEIGHT: 199.19 LBS | HEART RATE: 92 BPM

## 2024-01-29 DIAGNOSIS — Z72.51 HIGH RISK HETEROSEXUAL BEHAVIOR: ICD-10-CM

## 2024-01-29 DIAGNOSIS — Z01.419 WELL WOMAN EXAM WITH ROUTINE GYNECOLOGICAL EXAM: Primary | ICD-10-CM

## 2024-01-29 DIAGNOSIS — Z12.4 ENCOUNTER FOR SCREENING FOR MALIGNANT NEOPLASM OF CERVIX: ICD-10-CM

## 2024-01-29 DIAGNOSIS — Z11.3 SCREEN FOR SEXUALLY TRANSMITTED DISEASES: ICD-10-CM

## 2024-01-29 LAB
CANDIDA SPECIES: NEGATIVE
GARDNERELLA: POSITIVE
TRICHOMONAS: POSITIVE

## 2024-01-29 PROCEDURE — 3008F BODY MASS INDEX DOCD: CPT | Mod: CPTII,,, | Performed by: ADVANCED PRACTICE MIDWIFE

## 2024-01-29 PROCEDURE — 1159F MED LIST DOCD IN RCRD: CPT | Mod: CPTII,,, | Performed by: ADVANCED PRACTICE MIDWIFE

## 2024-01-29 PROCEDURE — 87660 TRICHOMONAS VAGIN DIR PROBE: CPT | Mod: ,,, | Performed by: CLINICAL MEDICAL LABORATORY

## 2024-01-29 PROCEDURE — 87510 GARDNER VAG DNA DIR PROBE: CPT | Mod: ,,, | Performed by: CLINICAL MEDICAL LABORATORY

## 2024-01-29 PROCEDURE — 3078F DIAST BP <80 MM HG: CPT | Mod: CPTII,,, | Performed by: ADVANCED PRACTICE MIDWIFE

## 2024-01-29 PROCEDURE — 87491 CHLMYD TRACH DNA AMP PROBE: CPT | Mod: ,,, | Performed by: CLINICAL MEDICAL LABORATORY

## 2024-01-29 PROCEDURE — 87480 CANDIDA DNA DIR PROBE: CPT | Mod: ,,, | Performed by: CLINICAL MEDICAL LABORATORY

## 2024-01-29 PROCEDURE — 3075F SYST BP GE 130 - 139MM HG: CPT | Mod: CPTII,,, | Performed by: ADVANCED PRACTICE MIDWIFE

## 2024-01-29 PROCEDURE — 87591 N.GONORRHOEAE DNA AMP PROB: CPT | Mod: ,,, | Performed by: CLINICAL MEDICAL LABORATORY

## 2024-01-29 PROCEDURE — 88142 CYTOPATH C/V THIN LAYER: CPT | Mod: TC,GCY | Performed by: ADVANCED PRACTICE MIDWIFE

## 2024-01-29 PROCEDURE — 99395 PREV VISIT EST AGE 18-39: CPT | Mod: ,,, | Performed by: ADVANCED PRACTICE MIDWIFE

## 2024-01-29 PROCEDURE — 87624 HPV HI-RISK TYP POOLED RSLT: CPT | Mod: ,,, | Performed by: CLINICAL MEDICAL LABORATORY

## 2024-01-29 NOTE — PROGRESS NOTES
"CC: Here for pap smear, annual exam    Nida Gold is a 23 y.o. female  presents for well woman exam.  LMP: Patient's last menstrual period was 2023 (exact date).. Menses are: normal. Denies any further issues, problems, or complaints.    Last mammogram: n/a  Colonoscopy: n/a    Past Medical History:   Diagnosis Date    Menorrhagia with irregular cycle 2022     History reviewed. No pertinent surgical history.  Social History     Socioeconomic History    Marital status: Single   Tobacco Use    Smoking status: Never     Passive exposure: Never    Smokeless tobacco: Never   Substance and Sexual Activity    Alcohol use: Never    Drug use: Never    Sexual activity: Yes     Partners: Male     Family History   Problem Relation Age of Onset    Hypertension Mother      OB History          3    Para   2    Term   2            AB        Living   2         SAB        IAB        Ectopic        Multiple        Live Births   2                 /79   Pulse 92   Temp 98.8 °F (37.1 °C) (Oral)   Resp 18   Ht 5' 6" (1.676 m)   Wt 90.4 kg (199 lb 3.2 oz)   LMP 2023 (Exact Date)   SpO2 99%   BMI 32.15 kg/m²       ROS:  GENERAL: Denies weight gain or weight loss. Feeling well overall.   SKIN: Denies rash or lesions.   HEAD: Denies head injury or headache.   NODES: Denies enlarged lymph nodes.   CHEST: Denies chest pain or shortness of breath.   CARDIOVASCULAR: Denies palpitations or left sided chest pain.   ABDOMEN: No abdominal pain, constipation, diarrhea, nausea, vomiting or rectal bleeding.   URINARY: No frequency, dysuria, hematuria, or burning on urination.  REPRODUCTIVE: See HPI.   BREASTS: The patient performs breast self-examination and denies pain, lumps, or nipple discharge.   HEMATOLOGIC: No easy bruisability or excessive bleeding.   MUSCULOSKELETAL: Denies joint pain or swelling.   NEUROLOGIC: Denies syncope or weakness.   PSYCHIATRIC: Denies depression, anxiety or mood " swings.    PHYSICAL EXAM:  APPEARANCE: Well nourished, well developed, in no acute distress.  AFFECT: WNL, alert and oriented x 3  SKIN: No acne or hirsutism  NECK: Neck symmetric without masses or thyromegaly  NODES: No inguinal, cervical, axillary, or femoral lymph node enlargement  CHEST: Good respiratory effect  ABDOMEN: Soft.  No tenderness or masses.  No hepatosplenomegaly.  No hernias.  BREASTS: Symmetrical, no skin changes or visible lesions.  No palpable masses, nipple discharge bilaterally.  PELVIC: Normal external genitalia without lesions.  Normal hair distribution.  Adequate perineal body, normal urethral meatus.  Vagina moist and well rugated without lesions, thin yellow odorous discharge.  Cervix pink, without lesions, tenderness with thin odorous yellow discharge.  No significant cystocele or rectocele.  Bimanual exam shows uterus to be normal size, regular, mobile and nontender.  Adnexa without masses or tenderness.    EXTREMITIES: No edema.    Well woman exam with routine gynecological exam  -     ThinPrep Pap Test; Future; Expected date: 01/29/2024    Encounter for screening for malignant neoplasm of cervix  -     ThinPrep Pap Test; Future; Expected date: 01/29/2024    Screen for sexually transmitted diseases  -     Chlamydia/GC, PCR; Future; Expected date: 01/29/2024  -     Bacterial Vaginosis; Future; Expected date: 01/29/2024    High risk heterosexual behavior  -     Chlamydia/GC, PCR; Future; Expected date: 01/29/2024  -     Bacterial Vaginosis; Future; Expected date: 01/29/2024        ICD-10-CM ICD-9-CM    1. Well woman exam with routine gynecological exam  Z01.419 V72.31 ThinPrep Pap Test      2. Encounter for screening for malignant neoplasm of cervix  Z12.4 V76.2 ThinPrep Pap Test      3. Screen for sexually transmitted diseases  Z11.3 V74.5 Chlamydia/GC, PCR      Bacterial Vaginosis      4. High risk heterosexual behavior  Z72.51 V69.2 Chlamydia/GC, PCR      Bacterial Vaginosis           Patient was counseled today on A.C.S. Pap guidelines and recommendations for yearly pelvic exams, mammograms and monthly self breast exams; to see her PCP for other health maintenance.   Exercise regimen encouraged  Healthy food choices encouraged  Multivitamins daily  Vitamin D daily  No quick jerky movements  Questions answered to desired level of satisfaction  Verbalized understanding to all information and instructions    Follow up in about 1 year (around 1/29/2025), or if symptoms worsen or fail to improve, for Annual Exam .

## 2024-01-30 DIAGNOSIS — Z20.2 TRICHOMONAS CONTACT: Primary | ICD-10-CM

## 2024-01-30 DIAGNOSIS — N76.0 BV (BACTERIAL VAGINOSIS): ICD-10-CM

## 2024-01-30 DIAGNOSIS — B96.89 BV (BACTERIAL VAGINOSIS): ICD-10-CM

## 2024-01-30 LAB
CHLAMYDIA BY PCR: NEGATIVE
N. GONORRHOEAE (GC) BY PCR: NEGATIVE

## 2024-01-30 RX ORDER — METRONIDAZOLE 500 MG/1
500 TABLET ORAL 2 TIMES DAILY
Qty: 14 TABLET | Refills: 0 | Status: SHIPPED | OUTPATIENT
Start: 2024-01-30 | End: 2024-02-06

## 2024-01-31 LAB
GH SERPL-MCNC: NORMAL NG/ML
INSULIN SERPL-ACNC: NORMAL U[IU]/ML
LAB AP CLINICAL INFORMATION: NORMAL
LAB AP GYN INTERPRETATION: NEGATIVE
LAB AP PAP DISCLAIMER COMMENTS: NORMAL
RENIN PLAS-CCNC: NORMAL NG/ML/H

## 2024-02-03 LAB
HPV 16: NEGATIVE
HPV 18: NEGATIVE
HPV OTHER: NEGATIVE

## 2024-02-14 ENCOUNTER — ROUTINE PRENATAL (OUTPATIENT)
Dept: OBSTETRICS AND GYNECOLOGY | Facility: CLINIC | Age: 24
End: 2024-02-14
Payer: MEDICAID

## 2024-02-14 ENCOUNTER — PROCEDURE VISIT (OUTPATIENT)
Dept: OBSTETRICS AND GYNECOLOGY | Facility: CLINIC | Age: 24
End: 2024-02-14
Payer: MEDICAID

## 2024-02-14 VITALS — SYSTOLIC BLOOD PRESSURE: 116 MMHG | DIASTOLIC BLOOD PRESSURE: 73 MMHG | HEART RATE: 76 BPM

## 2024-02-14 DIAGNOSIS — D64.9 ANEMIA, UNSPECIFIED TYPE: ICD-10-CM

## 2024-02-14 DIAGNOSIS — Z11.3 SCREEN FOR SEXUALLY TRANSMITTED DISEASES: ICD-10-CM

## 2024-02-14 DIAGNOSIS — E55.9 VITAMIN D DEFICIENCY, UNSPECIFIED: ICD-10-CM

## 2024-02-14 DIAGNOSIS — Z11.4 SCREENING FOR HIV (HUMAN IMMUNODEFICIENCY VIRUS): ICD-10-CM

## 2024-02-14 DIAGNOSIS — Z72.51 HIGH RISK HETEROSEXUAL BEHAVIOR: ICD-10-CM

## 2024-02-14 DIAGNOSIS — Z36.89 ENCOUNTER FOR OTHER SPECIFIED ANTENATAL SCREENING: ICD-10-CM

## 2024-02-14 DIAGNOSIS — Z3A.08 8 WEEKS GESTATION OF PREGNANCY: ICD-10-CM

## 2024-02-14 DIAGNOSIS — Z34.80 ENCOUNTER FOR SUPERVISION OF OTHER NORMAL PREGNANCY, UNSPECIFIED TRIMESTER: Primary | ICD-10-CM

## 2024-02-14 DIAGNOSIS — N91.1 SECONDARY AMENORRHEA: Primary | ICD-10-CM

## 2024-02-14 LAB
25(OH)D3 SERPL-MCNC: 25.3 NG/ML
BASOPHILS # BLD AUTO: 0.03 K/UL (ref 0–0.2)
BASOPHILS NFR BLD AUTO: 0.3 % (ref 0–1)
BILIRUB SERPL-MCNC: NORMAL MG/DL
BLOOD, POC UA: NORMAL
DIFFERENTIAL METHOD BLD: ABNORMAL
EOSINOPHIL # BLD AUTO: 0.03 K/UL (ref 0–0.5)
EOSINOPHIL NFR BLD AUTO: 0.3 % (ref 1–4)
ERYTHROCYTE [DISTWIDTH] IN BLOOD BY AUTOMATED COUNT: 17 % (ref 11.5–14.5)
EST. AVERAGE GLUCOSE BLD GHB EST-MCNC: 108 MG/DL
GLUCOSE UR QL STRIP: NORMAL
HBA1C MFR BLD HPLC: 5.4 % (ref 4.5–6.6)
HBV SURFACE AG SERPL QL IA: NORMAL
HCT VFR BLD AUTO: 33.5 % (ref 38–47)
HGB BLD-MCNC: 10.7 G/DL (ref 12–16)
HIV 1+O+2 AB SERPL QL: NORMAL
IMM GRANULOCYTES # BLD AUTO: 0.02 K/UL (ref 0–0.04)
IMM GRANULOCYTES NFR BLD: 0.2 % (ref 0–0.4)
INDIRECT COOMBS: NORMAL
KETONES UR QL STRIP: NORMAL
LEUKOCYTE ESTERASE URINE, POC: NORMAL
LYMPHOCYTES # BLD AUTO: 1.61 K/UL (ref 1–4.8)
LYMPHOCYTES NFR BLD AUTO: 18.7 % (ref 27–41)
MCH RBC QN AUTO: 25.7 PG (ref 27–31)
MCHC RBC AUTO-ENTMCNC: 31.9 G/DL (ref 32–36)
MCV RBC AUTO: 80.3 FL (ref 80–96)
MONOCYTES # BLD AUTO: 0.47 K/UL (ref 0–0.8)
MONOCYTES NFR BLD AUTO: 5.5 % (ref 2–6)
MPC BLD CALC-MCNC: 12.8 FL (ref 9.4–12.4)
NEUTROPHILS # BLD AUTO: 6.45 K/UL (ref 1.8–7.7)
NEUTROPHILS NFR BLD AUTO: 75 % (ref 53–65)
NITRITE, POC UA: NORMAL
NRBC # BLD AUTO: 0 X10E3/UL
NRBC, AUTO (.00): 0 %
PH, POC UA: 8.5
PLATELET # BLD AUTO: 216 K/UL (ref 150–400)
PROTEIN, POC: 100
RBC # BLD AUTO: 4.17 M/UL (ref 4.2–5.4)
RH BLD: NORMAL
RUBV IGG SER-ACNC: NORMAL [IU]/ML
SPECIFIC GRAVITY, POC UA: 1.02
SPECIMEN OUTDATE: NORMAL
SYPHILIS AB INTERPRETATION: NORMAL
UROBILINOGEN, POC UA: 0.1
WBC # BLD AUTO: 8.61 K/UL (ref 4.5–11)

## 2024-02-14 PROCEDURE — 86901 BLOOD TYPING SEROLOGIC RH(D): CPT | Mod: ,,, | Performed by: CLINICAL MEDICAL LABORATORY

## 2024-02-14 PROCEDURE — 36415 COLL VENOUS BLD VENIPUNCTURE: CPT | Mod: ,,, | Performed by: ADVANCED PRACTICE MIDWIFE

## 2024-02-14 PROCEDURE — 83036 HEMOGLOBIN GLYCOSYLATED A1C: CPT | Mod: ,,, | Performed by: CLINICAL MEDICAL LABORATORY

## 2024-02-14 PROCEDURE — 82306 VITAMIN D 25 HYDROXY: CPT | Mod: ,,, | Performed by: CLINICAL MEDICAL LABORATORY

## 2024-02-14 PROCEDURE — 85660 RBC SICKLE CELL TEST: CPT | Mod: ,,, | Performed by: CLINICAL MEDICAL LABORATORY

## 2024-02-14 PROCEDURE — 87389 HIV-1 AG W/HIV-1&-2 AB AG IA: CPT | Mod: ,,, | Performed by: CLINICAL MEDICAL LABORATORY

## 2024-02-14 PROCEDURE — 99499 UNLISTED E&M SERVICE: CPT | Mod: ,,, | Performed by: OBSTETRICS & GYNECOLOGY

## 2024-02-14 PROCEDURE — 86900 BLOOD TYPING SEROLOGIC ABO: CPT | Mod: ,,, | Performed by: CLINICAL MEDICAL LABORATORY

## 2024-02-14 PROCEDURE — 86780 TREPONEMA PALLIDUM: CPT | Mod: ,,, | Performed by: CLINICAL MEDICAL LABORATORY

## 2024-02-14 PROCEDURE — 87340 HEPATITIS B SURFACE AG IA: CPT | Mod: ,,, | Performed by: CLINICAL MEDICAL LABORATORY

## 2024-02-14 PROCEDURE — 86850 RBC ANTIBODY SCREEN: CPT | Mod: ,,, | Performed by: CLINICAL MEDICAL LABORATORY

## 2024-02-14 PROCEDURE — 86762 RUBELLA ANTIBODY: CPT | Mod: ,,, | Performed by: CLINICAL MEDICAL LABORATORY

## 2024-02-14 PROCEDURE — 99213 OFFICE O/P EST LOW 20 MIN: CPT | Mod: TH,,, | Performed by: ADVANCED PRACTICE MIDWIFE

## 2024-02-14 PROCEDURE — 76801 OB US < 14 WKS SINGLE FETUS: CPT | Mod: ,,, | Performed by: OBSTETRICS & GYNECOLOGY

## 2024-02-14 PROCEDURE — 85025 COMPLETE CBC W/AUTO DIFF WBC: CPT | Mod: ,,, | Performed by: CLINICAL MEDICAL LABORATORY

## 2024-02-14 RX ORDER — FERROUS SULFATE 325(65) MG
325 TABLET, DELAYED RELEASE (ENTERIC COATED) ORAL 2 TIMES DAILY
Qty: 60 TABLET | Refills: 4 | Status: SHIPPED | OUTPATIENT
Start: 2024-02-14

## 2024-02-14 NOTE — PROGRESS NOTES
23 y.o. female  at 8w0d   She c/o no problems. US today  Reports no fetal movement or fluttering. Denies any vaginal bleeding, leakage of fluid, cramping, contractions, or pressure.   Total weight gain/weight loss in pregnancy: Not found.     Vitals  BP: 116/73  Pulse: 76  Prenatal  Fetal Heart Rate: 166  Movement: Absent  Urine Albumin/Glucose  Urine Albumin: 2+  Urine Glucose: Negative  Edema  LLE Edema: None  RLE Edema: None  Facial: None  Additional Edema?: No    Prenatal Labs:  Lab Results   Component Value Date    HGB 10.3 (L) 2024    HCT 32.6 (L) 2024     2024    [id number] Non-Reactive 2022    LABNGO Negative 2024    [id number] Positive (AA) 2021       A: 8w0d           ICD-10-CM ICD-9-CM    1. Encounter for supervision of other normal pregnancy, unspecified trimester  Z34.80 V22.1 Type & Screen      CBC Auto Differential      Rubella Antibody Screen      Urine culture      Sickle Cell Screen      Hemoglobin A1C      Type & Screen      CBC Auto Differential      Rubella Antibody Screen      Sickle Cell Screen      Hemoglobin A1C      2. Screen for sexually transmitted diseases  Z11.3 V74.5 Hepatitis B Surface Antigen      Treponema Pallidum (Syphillis) Antibody      Hepatitis B Surface Antigen      Treponema Pallidum (Syphillis) Antibody      3. High risk heterosexual behavior  Z72.51 V69.2 Hepatitis B Surface Antigen      HIV 1/2 Ag/Ab (4th Gen)      Treponema Pallidum (Syphillis) Antibody      Hepatitis B Surface Antigen      HIV 1/2 Ag/Ab (4th Gen)      Treponema Pallidum (Syphillis) Antibody      4. Screening for HIV (human immunodeficiency virus)  Z11.4 V73.89 HIV 1/2 Ag/Ab (4th Gen)      HIV 1/2 Ag/Ab (4th Gen)      5. Vitamin D deficiency, unspecified  E55.9 268.9 Vitamin D      Vitamin D      6. Encounter for other specified  screening  Z36.89 V28.9 Drug Screen Definitive 14, Urine      7. 8 weeks gestation of pregnancy  Z3A.08 V22.2 POCT  URINALYSIS          P: Bleeding, daily fetal kick counts, and  labor/labor precautions discussed.    The following were addressed during this visit:    1-8 Weeks  - Lifestyle Discussion   - Warning Signs   - Course of Care   - Physiology of Pregnancy   - Nutrition and Supplements   - Domestic Abuse Screen   - HIV Counseling   - Smoking Intervention   - SPAAD/Insurance Verification   - Importance of Exclusive Breastfeeding for First 6 Months   - Continuation of Breastfeeding of Complimentary after intro of solid foods   - Benefits of Breastfeeding     8-12 Weeks  - Review lab tests   - Genetic Counseling (NT/CVS/Amino)   - Influenza IM (for due date  - 3/31)   - Non-pharmacologic Pain Relief Methods for Labor & Birth       Questions answered to desired level of satisfaction  Verbalized understanding to all information and instructions provided.  Follow up in about 4 weeks (around 3/13/2024), or if symptoms worsen or fail to improve, for SARAH Davey, DNP, CNM, WHNP-BC

## 2024-02-15 LAB — HGB S BLD QL SOLY: NEGATIVE

## 2024-02-29 NOTE — PROCEDURES
New OB Ultrasound note:      Uterus 9.56 x 6.7 cm  Right ovary 3.71 x 1.65 x 1.42 cm  Left ovary 4.8 x 2.31 x 2.6 cm    Crown-rump length 8 weeks 4 day  Fetal heart rate 166 beats per minute     Impression:    IUP with fetal heart tones   Estimated gestational age 8 weeks 4 day  Estimated delivery date September 21, 2024

## 2024-03-06 ENCOUNTER — ROUTINE PRENATAL (OUTPATIENT)
Dept: OBSTETRICS AND GYNECOLOGY | Facility: CLINIC | Age: 24
End: 2024-03-06
Payer: MEDICAID

## 2024-03-06 VITALS
BODY MASS INDEX: 33.15 KG/M2 | HEART RATE: 69 BPM | WEIGHT: 205.38 LBS | DIASTOLIC BLOOD PRESSURE: 73 MMHG | SYSTOLIC BLOOD PRESSURE: 113 MMHG

## 2024-03-06 DIAGNOSIS — D64.9 ANEMIA, UNSPECIFIED TYPE: ICD-10-CM

## 2024-03-06 DIAGNOSIS — Z3A.11 11 WEEKS GESTATION OF PREGNANCY: ICD-10-CM

## 2024-03-06 DIAGNOSIS — Z36.89 ENCOUNTER FOR OTHER SPECIFIED ANTENATAL SCREENING: Primary | ICD-10-CM

## 2024-03-06 LAB
BILIRUB SERPL-MCNC: NORMAL MG/DL
BLOOD, POC UA: NORMAL
GLUCOSE UR QL STRIP: NORMAL
KETONES UR QL STRIP: NORMAL
LEUKOCYTE ESTERASE URINE, POC: NORMAL
NITRITE, POC UA: NORMAL
PH, POC UA: 6.5
PROTEIN, POC: NORMAL
SPECIFIC GRAVITY, POC UA: >=1.03
UROBILINOGEN, POC UA: 0.2

## 2024-03-06 PROCEDURE — 99213 OFFICE O/P EST LOW 20 MIN: CPT | Mod: TH,,, | Performed by: ADVANCED PRACTICE MIDWIFE

## 2024-03-06 NOTE — PROGRESS NOTES
23 y.o. female  at 11w0d   She c/o nausea at night  Reports no fetal movement or fluttering. Denies any vaginal bleeding, leakage of fluid, cramping, contractions, or pressure.   Total weight gain/weight loss in pregnancy: Not found.     Vitals  BP: 113/73  Pulse: 69  Weight: 93.2 kg (205 lb 6.4 oz)  Prenatal  Fundal Height (cm): 12 cm  Fetal Heart Rate: 166  Movement: Absent  Urine Albumin/Glucose  Urine Albumin: Negative  Urine Glucose: Negative  Edema  LLE Edema: None  RLE Edema: None  Facial: None  Additional Edema?: No    Prenatal Labs:  Lab Results   Component Value Date    GROUPTRH B POS 2024    HGB 10.7 (L) 2024    HCT 33.5 (L) 2024     2024    SICKLE Negative 2024    HEPBSAG Non-Reactive 2024    [id number] Non-Reactive 2024    LABNGO Negative 2024    [id number] Positive (AA) 2021       A: 11w0d           ICD-10-CM ICD-9-CM    1. Encounter for other specified  screening  Z36.89 V28.9 Miscellaneous Test, Sendout Monique      2. 11 weeks gestation of pregnancy  Z3A.11 V22.2 POCT URINALYSIS      3. Anemia, unspecified type  D64.9 285.9           P: Bleeding, daily fetal kick counts, and  labor/labor precautions discussed.    No pregnancy checklist tasks were completed during this visit, and no tasks are pending completion.      Questions answered to desired level of satisfaction  Verbalized understanding to all information and instructions provided.  Follow up in about 4 weeks (around 4/3/2024), or if symptoms worsen or fail to improve, for SARAH Davey, JENNIFER, CNM, WHNP-BC

## 2024-03-23 ENCOUNTER — HOSPITAL ENCOUNTER (EMERGENCY)
Facility: HOSPITAL | Age: 24
Discharge: HOME | End: 2024-03-23
Attending: FAMILY MEDICINE
Payer: MEDICAID

## 2024-03-23 VITALS
HEART RATE: 86 BPM | SYSTOLIC BLOOD PRESSURE: 134 MMHG | WEIGHT: 205.5 LBS | BODY MASS INDEX: 33.17 KG/M2 | TEMPERATURE: 99 F | RESPIRATION RATE: 18 BRPM | DIASTOLIC BLOOD PRESSURE: 79 MMHG | OXYGEN SATURATION: 99 %

## 2024-03-23 DIAGNOSIS — O20.0 THREATENED MISCARRIAGE IN EARLY PREGNANCY: Primary | ICD-10-CM

## 2024-03-23 DIAGNOSIS — R10.9 ABDOMINAL PAIN: ICD-10-CM

## 2024-03-23 LAB
B-HCG UR QL: POSITIVE
BACTERIA #/AREA URNS HPF: ABNORMAL /HPF
BILIRUB UR QL STRIP: NEGATIVE
CLARITY UR: ABNORMAL
COLOR UR: ABNORMAL
CTP QC/QA: YES
GLUCOSE UR STRIP-MCNC: NORMAL MG/DL
KETONES UR STRIP-SCNC: NEGATIVE MG/DL
LEUKOCYTE ESTERASE UR QL STRIP: ABNORMAL
MUCOUS, UA: ABNORMAL /LPF
NITRITE UR QL STRIP: NEGATIVE
PH UR STRIP: 6 PH UNITS
PROT UR QL STRIP: 10
RBC # UR STRIP: NEGATIVE /UL
RBC #/AREA URNS HPF: 1 /HPF
SP GR UR STRIP: 1.03
SQUAMOUS #/AREA URNS LPF: ABNORMAL /HPF
UROBILINOGEN UR STRIP-ACNC: NORMAL MG/DL
WBC #/AREA URNS HPF: 4 /HPF

## 2024-03-23 PROCEDURE — 99284 EMERGENCY DEPT VISIT MOD MDM: CPT | Mod: ,,, | Performed by: FAMILY MEDICINE

## 2024-03-23 PROCEDURE — 81025 URINE PREGNANCY TEST: CPT | Performed by: FAMILY MEDICINE

## 2024-03-23 PROCEDURE — 99284 EMERGENCY DEPT VISIT MOD MDM: CPT | Mod: 25

## 2024-03-23 PROCEDURE — 81001 URINALYSIS AUTO W/SCOPE: CPT | Performed by: FAMILY MEDICINE

## 2024-03-23 NOTE — Clinical Note
"Nida "Nidaefrain Gold was seen and treated in our emergency department on 3/23/2024.  She may return to work on 04/02/2024.       If you have any questions or concerns, please don't hesitate to call.      Diallo Lopez, DO"

## 2024-03-24 NOTE — ED PROVIDER NOTES
Encounter Date: 3/23/2024    SCRIBE #1 NOTE: I, Traci Zhao, am scribing for, and in the presence of,  Diallo Lopez DO. I have scribed the entire note.       History     Chief Complaint   Patient presents with    Abdominal Pain    Vaginal Bleeding     This is a 23 y/o female,who presents to the ED with complaints of vaginal spotting which started 45 minutes PTA. She states she is currently 14 weeks gestation and noticed vaginal spotting. She denies any nausea or vomiting. There are no other complaints/pain in the ED at this time. There is no past medical hx on file. There is no surgical hx on file. There is no smoking hx on file.     The history is provided by the patient. No  was used.     Review of patient's allergies indicates:  No Known Allergies  Past Medical History:   Diagnosis Date    Menorrhagia with irregular cycle 5/12/2022     No past surgical history on file.  Family History   Problem Relation Age of Onset    Hypertension Mother      Social History     Tobacco Use    Smoking status: Never     Passive exposure: Never    Smokeless tobacco: Never   Substance Use Topics    Alcohol use: Never    Drug use: Never     Review of Systems   Gastrointestinal:  Negative for nausea and vomiting.   Genitourinary:  Positive for vaginal bleeding.   All other systems reviewed and are negative.      Physical Exam     Initial Vitals   BP Pulse Resp Temp SpO2   03/23/24 2210 03/23/24 2210 03/23/24 2211 03/23/24 2210 03/23/24 2210   134/79 86 18 98.8 °F (37.1 °C) 99 %      MAP       --                Physical Exam    Nursing note and vitals reviewed.  Constitutional: She appears well-developed and well-nourished.   HENT:   Head: Normocephalic and atraumatic.   Eyes: Conjunctivae and EOM are normal. Pupils are equal, round, and reactive to light.   Neck: Neck supple.   Normal range of motion.  Musculoskeletal:         General: No tenderness. Normal range of motion.      Cervical back: Normal  range of motion and neck supple.     Neurological: She is alert and oriented to person, place, and time.   Skin: Skin is warm and dry.   Psychiatric: She has a normal mood and affect.         ED Course   Procedures  Labs Reviewed   URINALYSIS, REFLEX TO URINE CULTURE - Abnormal; Notable for the following components:       Result Value    Leukocytes, UA Large (*)     Protein, UA 10 (*)     All other components within normal limits   URINALYSIS, MICROSCOPIC - Abnormal; Notable for the following components:    Bacteria, UA Few (*)     Squamous Epithelial Cells, UA Occasional (*)     Mucous Moderate (*)     All other components within normal limits   POCT URINE PREGNANCY - Abnormal; Notable for the following components:    POC Preg Test, Ur Positive (*)     All other components within normal limits          Imaging Results    None          Medications - No data to display  Medical Decision Making  Amount and/or Complexity of Data Reviewed  Labs: ordered.  Radiology: ordered.              Attending Attestation:           Physician Attestation for Scribe:  Physician Attestation Statement for Scribe #1: I, Diallo Lopez DO, reviewed documentation, as scribed by Traci Zhao in my presence, and it is both accurate and complete.                        Medical Decision Making:   Initial Assessment:   Patient in with vaginal bleeding.  Differential Diagnosis:   Ultrasound reveals that the patient has a placenta previa.  ED Management:  Threatened  secondary to placenta previa.  The patient should be a completely bed rest             Clinical Impression:  Final diagnoses:  [R10.9] Abdominal pain  [O20.0] Threatened miscarriage in early pregnancy (Primary)          ED Disposition Condition    Discharge Stable          ED Prescriptions    None       Follow-up Information    None          Diallo Lopez DO  24 [id number]

## 2024-03-24 NOTE — ED TRIAGE NOTES
Pt states that she is 14 wks pregnant & is having some lower abdominal pain & is spotting. States that this started 45 mins PTA. States that she has been arguing & hollering a lot today.

## 2024-04-04 ENCOUNTER — ROUTINE PRENATAL (OUTPATIENT)
Dept: OBSTETRICS AND GYNECOLOGY | Facility: CLINIC | Age: 24
End: 2024-04-04
Payer: MEDICAID

## 2024-04-04 VITALS
SYSTOLIC BLOOD PRESSURE: 105 MMHG | DIASTOLIC BLOOD PRESSURE: 71 MMHG | WEIGHT: 206 LBS | BODY MASS INDEX: 33.25 KG/M2 | HEART RATE: 74 BPM

## 2024-04-04 DIAGNOSIS — Z36.89 ENCOUNTER FOR FETAL ANATOMIC SURVEY: ICD-10-CM

## 2024-04-04 DIAGNOSIS — N94.9 ROUND LIGAMENT PAIN: ICD-10-CM

## 2024-04-04 DIAGNOSIS — Z3A.15 15 WEEKS GESTATION OF PREGNANCY: Primary | ICD-10-CM

## 2024-04-04 DIAGNOSIS — D64.9 ANEMIA, UNSPECIFIED TYPE: ICD-10-CM

## 2024-04-04 LAB
BILIRUB SERPL-MCNC: NORMAL MG/DL
BLOOD, POC UA: NORMAL
GLUCOSE UR QL STRIP: NORMAL
KETONES UR QL STRIP: NORMAL
LEUKOCYTE ESTERASE URINE, POC: NORMAL
NITRITE, POC UA: NORMAL
PH, POC UA: 7
PROTEIN, POC: 30
SPECIFIC GRAVITY, POC UA: 1.02
UROBILINOGEN, POC UA: 1

## 2024-04-04 PROCEDURE — 59425 ANTEPARTUM CARE ONLY: CPT | Mod: TH,,, | Performed by: ADVANCED PRACTICE MIDWIFE

## 2024-04-04 NOTE — PROGRESS NOTES
24 y.o. female  at 15w1d   She c/o ligament discomfort  Reports some fetal movement or fluttering. Denies any vaginal bleeding, leakage of fluid, cramping, contractions, or pressure.   Total weight gain/weight loss in pregnancy: 4.99 kg (11 lb)     Vitals  BP: 105/71  Pulse: 74  Weight: 93.4 kg (206 lb)  Prenatal  Fundal Height (cm): 15 cm  Fetal Heart Rate: 145  Movement: Present  Urine Albumin/Glucose  Urine Albumin: 1+  Urine Glucose: Negative  Edema  LLE Edema: None  RLE Edema: None  Facial: None  Additional Edema?: No    Prenatal Labs:  Lab Results   Component Value Date    GROUPTRH B POS 2024    HGB 10.7 (L) 2024    HCT 33.5 (L) 2024     2024    SICKLE Negative 2024    HEPBSAG Non-Reactive 2024    [id number] Non-Reactive 2024    LABNGO Negative 2024    [id number] Positive (AA) 2021       A: 15w1d           ICD-10-CM ICD-9-CM    1. 15 weeks gestation of pregnancy  Z3A.15 V22.2 POCT URINALYSIS      2. Encounter for fetal anatomic survey  Z36.89 V28.81 US OB 14+ Wks, TransAbd, Single Gestation      3. Round ligament pain  N94.9 625.9       4. Anemia, unspecified type  D64.9 285.9           P: Bleeding, daily fetal kick counts, and  labor/labor precautions discussed.    The following were addressed during this visit:    13-16 Weeks  - Quad screen   - Anatomy Ultrasound   - Breastfeeding Concerns & Resources   - Importance of Early Skin to Skin Contact   Warm bath soaks prn, Tylenol ES OTC prn, maternal belt prn ligament pain  Questions answered to desired level of satisfaction  Verbalized understanding to all information and instructions provided.  Follow up in about 4 weeks (around 2024), or if symptoms worsen or fail to improve, for SARAH Davey, JENNIFER, CNM, WHNP-BC

## 2024-04-04 NOTE — NURSING
Patient stated understanding of Pregnancy checklist on 13-16 weeks regarding anatomy scan and skin to skin.

## 2024-05-02 ENCOUNTER — ROUTINE PRENATAL (OUTPATIENT)
Dept: OBSTETRICS AND GYNECOLOGY | Facility: CLINIC | Age: 24
End: 2024-05-02
Payer: MEDICAID

## 2024-05-02 VITALS
DIASTOLIC BLOOD PRESSURE: 72 MMHG | BODY MASS INDEX: 33.89 KG/M2 | SYSTOLIC BLOOD PRESSURE: 104 MMHG | HEART RATE: 72 BPM | WEIGHT: 210 LBS

## 2024-05-02 DIAGNOSIS — Z3A.19 19 WEEKS GESTATION OF PREGNANCY: Primary | ICD-10-CM

## 2024-05-02 LAB
BILIRUB SERPL-MCNC: NORMAL MG/DL
BLOOD, POC UA: NORMAL
GLUCOSE UR QL STRIP: NORMAL
KETONES UR QL STRIP: NORMAL
LEUKOCYTE ESTERASE URINE, POC: NORMAL
NITRITE, POC UA: NORMAL
PH, POC UA: 7
PROTEIN, POC: NORMAL
SPECIFIC GRAVITY, POC UA: >=1.03
UROBILINOGEN, POC UA: 1

## 2024-05-02 PROCEDURE — 59425 ANTEPARTUM CARE ONLY: CPT | Mod: TH,,, | Performed by: ADVANCED PRACTICE MIDWIFE

## 2024-05-16 ENCOUNTER — HOSPITAL ENCOUNTER (OUTPATIENT)
Dept: RADIOLOGY | Facility: HOSPITAL | Age: 24
Discharge: HOME | End: 2024-05-16
Attending: ADVANCED PRACTICE MIDWIFE
Payer: MEDICAID

## 2024-05-16 DIAGNOSIS — Z36.89 ENCOUNTER FOR FETAL ANATOMIC SURVEY: ICD-10-CM

## 2024-05-16 PROCEDURE — 76805 OB US >/= 14 WKS SNGL FETUS: CPT | Mod: TC

## 2024-05-16 PROCEDURE — 76805 OB US >/= 14 WKS SNGL FETUS: CPT | Mod: 26,,, | Performed by: RADIOLOGY

## 2024-05-28 ENCOUNTER — ROUTINE PRENATAL (OUTPATIENT)
Dept: OBSTETRICS AND GYNECOLOGY | Facility: CLINIC | Age: 24
End: 2024-05-28
Payer: MEDICAID

## 2024-05-28 VITALS
HEART RATE: 85 BPM | WEIGHT: 202 LBS | DIASTOLIC BLOOD PRESSURE: 66 MMHG | SYSTOLIC BLOOD PRESSURE: 102 MMHG | BODY MASS INDEX: 32.6 KG/M2

## 2024-05-28 DIAGNOSIS — D64.9 ANEMIA, UNSPECIFIED TYPE: ICD-10-CM

## 2024-05-28 DIAGNOSIS — Z3A.23 23 WEEKS GESTATION OF PREGNANCY: Primary | ICD-10-CM

## 2024-05-28 LAB
BILIRUB SERPL-MCNC: NORMAL MG/DL
BLOOD, POC UA: NORMAL
GLUCOSE UR QL STRIP: NORMAL
KETONES UR QL STRIP: NORMAL
LEUKOCYTE ESTERASE URINE, POC: NORMAL
NITRITE, POC UA: NORMAL
PH, POC UA: 7
PROTEIN, POC: NORMAL
SPECIFIC GRAVITY, POC UA: 1.02
UROBILINOGEN, POC UA: 0.2

## 2024-05-28 PROCEDURE — 59425 ANTEPARTUM CARE ONLY: CPT | Mod: TH,,, | Performed by: ADVANCED PRACTICE MIDWIFE

## 2024-05-28 NOTE — PROGRESS NOTES
24 y.o. female  at 22w6d   She c/o no problems  Reports good fetal movement or fluttering. Denies any vaginal bleeding, leakage of fluid, cramping, contractions, or pressure.   Total weight gain/weight loss in pregnancy: 3.175 kg (7 lb)     Vitals  BP: 102/66  Pulse: 85  Weight: 91.6 kg (202 lb)  Prenatal  Fundal Height (cm): 24 cm  Fetal Heart Rate: 135  Movement: Present  Urine Albumin/Glucose  Urine Albumin: Negative  Urine Glucose: Negative  Edema  LLE Edema: None  RLE Edema: None  Facial: None  Additional Edema?: No    Prenatal Labs:  Lab Results   Component Value Date    GROUPTRH B POS 2024    HGB 10.7 (L) 2024    HCT 33.5 (L) 2024     2024    SICKLE Negative 2024    HEPBSAG Non-Reactive 2024    [id number] Non-Reactive 2024    LABNGO Negative 2024    [id number] Positive (AA) 2021       A: 22w6d           ICD-10-CM ICD-9-CM    1. 23 weeks gestation of pregnancy  Z3A.23 V22.2 POCT URINALYSIS      2. Anemia, unspecified type  D64.9 285.9           P: Bleeding, daily fetal kick counts, and  labor/labor precautions discussed.    No pregnancy checklist tasks were completed during this visit, and no tasks are pending completion.      Questions answered to desired level of satisfaction  Verbalized understanding to all information and instructions provided.  Follow up in about 4 weeks (around 2024), or if symptoms worsen or fail to improve, for SARAH Davey, DNP, CNM, WHNP-BC

## 2024-06-24 ENCOUNTER — ROUTINE PRENATAL (OUTPATIENT)
Dept: OBSTETRICS AND GYNECOLOGY | Facility: CLINIC | Age: 24
End: 2024-06-24
Payer: MEDICAID

## 2024-06-24 VITALS
BODY MASS INDEX: 34.77 KG/M2 | SYSTOLIC BLOOD PRESSURE: 109 MMHG | WEIGHT: 215.38 LBS | HEART RATE: 75 BPM | DIASTOLIC BLOOD PRESSURE: 72 MMHG

## 2024-06-24 DIAGNOSIS — Z3A.26 26 WEEKS GESTATION OF PREGNANCY: Primary | ICD-10-CM

## 2024-06-24 LAB
BILIRUB SERPL-MCNC: NORMAL MG/DL
BLOOD, POC UA: NORMAL
GLUCOSE UR QL STRIP: NORMAL
KETONES UR QL STRIP: NORMAL
LEUKOCYTE ESTERASE URINE, POC: NORMAL
NITRITE, POC UA: NORMAL
PH, POC UA: 7.5
PROTEIN, POC: 30
SPECIFIC GRAVITY, POC UA: 1.02
UROBILINOGEN, POC UA: 0.2

## 2024-06-24 PROCEDURE — 59425 ANTEPARTUM CARE ONLY: CPT | Mod: TH,,, | Performed by: ADVANCED PRACTICE MIDWIFE

## 2024-06-24 NOTE — PROGRESS NOTES
24 y.o. female  at 26w5d   She c/o occasional cramping.  Reports good fetal movement or fluttering. Denies any vaginal bleeding, leakage of fluid, cramping, contractions, or pressure.   Total weight gain/weight loss in pregnancy: 9.253 kg (20 lb 6.4 oz)     Vitals  BP: 109/72  Pulse: 75  Weight: 97.7 kg (215 lb 6.4 oz)  Prenatal  Fundal Height (cm): 26 cm  Fetal Heart Rate: 129  Movement: Present  Urine Albumin/Glucose  Urine Albumin: 1+  Urine Glucose: Negative  Edema  LLE Edema: None  RLE Edema: None  Facial: None  Additional Edema?: No    Prenatal Labs:  Lab Results   Component Value Date    GROUPTRH B POS 2024    HGB 10.7 (L) 2024    HCT 33.5 (L) 2024     2024    SICKLE Negative 2024    HEPBSAG Non-Reactive 2024    [id number] Non-Reactive 2024    LABNGO Negative 2024    [id number] Positive (AA) 2021       A: 26w5d           ICD-10-CM ICD-9-CM    1. 26 weeks gestation of pregnancy  Z3A.26 V22.2 POCT URINALYSIS          P: Bleeding, daily fetal kick counts, and  labor/labor precautions discussed.    The following were addressed during this visit:    21-24 Weeks  -  Labor Signs   - Travel During Pregnancy   - Gestational diabetes screening protocol   - Effective Position and Latch   - Risks of Formula Use   - Risks of pacifier use     25-28 Weeks  -  Labor Signs   - Childbirth Education   - Maternity Leave paperwork   - Smoking Intervention   - Weight Gain/Diet/Exercise   - Rhogam Given   - Rooming in baby during your hospital stay       Questions answered to desired level of satisfaction  Verbalized understanding to all information and instructions provided.  Follow up in about 2 weeks (around 2024), or if symptoms worsen or fail to improve, for GIULIA, 1 hr gtt.    Chula Davey, JENNIFER, CNM, WHNP-BC

## 2024-07-11 ENCOUNTER — ROUTINE PRENATAL (OUTPATIENT)
Dept: OBSTETRICS AND GYNECOLOGY | Facility: CLINIC | Age: 24
End: 2024-07-11
Payer: MEDICAID

## 2024-07-11 VITALS
BODY MASS INDEX: 34.86 KG/M2 | WEIGHT: 216 LBS | DIASTOLIC BLOOD PRESSURE: 75 MMHG | HEART RATE: 65 BPM | SYSTOLIC BLOOD PRESSURE: 115 MMHG

## 2024-07-11 DIAGNOSIS — Z3A.29 29 WEEKS GESTATION OF PREGNANCY: Primary | ICD-10-CM

## 2024-07-11 LAB
ANISOCYTOSIS BLD QL SMEAR: ABNORMAL
BASOPHILS # BLD AUTO: 0.02 K/UL (ref 0–0.2)
BASOPHILS NFR BLD AUTO: 0.2 % (ref 0–1)
BILIRUB SERPL-MCNC: NORMAL MG/DL
BLOOD, POC UA: NORMAL
DIFFERENTIAL METHOD BLD: ABNORMAL
EOSINOPHIL # BLD AUTO: 0.03 K/UL (ref 0–0.5)
EOSINOPHIL NFR BLD AUTO: 0.3 % (ref 1–4)
ERYTHROCYTE [DISTWIDTH] IN BLOOD BY AUTOMATED COUNT: 15.6 % (ref 11.5–14.5)
GLUCOSE SERPL-MCNC: 110 MG/DL (ref 74–106)
GLUCOSE UR QL STRIP: NORMAL
HCT VFR BLD AUTO: 31.2 % (ref 38–47)
HGB BLD-MCNC: 9.4 G/DL (ref 12–16)
IMM GRANULOCYTES # BLD AUTO: 0.04 K/UL (ref 0–0.04)
IMM GRANULOCYTES NFR BLD: 0.4 % (ref 0–0.4)
KETONES UR QL STRIP: NORMAL
LEUKOCYTE ESTERASE URINE, POC: NORMAL
LYMPHOCYTES # BLD AUTO: 1.11 K/UL (ref 1–4.8)
LYMPHOCYTES NFR BLD AUTO: 11.9 % (ref 27–41)
MCH RBC QN AUTO: 25.6 PG (ref 27–31)
MCHC RBC AUTO-ENTMCNC: 30.1 G/DL (ref 32–36)
MCV RBC AUTO: 85 FL (ref 80–96)
MONOCYTES # BLD AUTO: 0.42 K/UL (ref 0–0.8)
MONOCYTES NFR BLD AUTO: 4.5 % (ref 2–6)
MPC BLD CALC-MCNC: 13.7 FL (ref 9.4–12.4)
NEUTROPHILS # BLD AUTO: 7.73 K/UL (ref 1.8–7.7)
NEUTROPHILS NFR BLD AUTO: 82.7 % (ref 53–65)
NITRITE, POC UA: NORMAL
NRBC # BLD AUTO: 0 X10E3/UL
NRBC, AUTO (.00): 0 %
PH, POC UA: 7
PLATELET # BLD AUTO: 168 K/UL (ref 150–400)
PLATELET MORPHOLOGY: ABNORMAL
PROTEIN, POC: 30
RBC # BLD AUTO: 3.67 M/UL (ref 4.2–5.4)
SPECIFIC GRAVITY, POC UA: >=1.03
UROBILINOGEN, POC UA: 1
WBC # BLD AUTO: 9.35 K/UL (ref 4.5–11)

## 2024-07-11 PROCEDURE — 36415 COLL VENOUS BLD VENIPUNCTURE: CPT | Mod: ,,, | Performed by: ADVANCED PRACTICE MIDWIFE

## 2024-07-11 NOTE — PROGRESS NOTES
24 y.o. female  at 29w1d   She c/o no problems  Reports good fetal movement or fluttering. Denies any vaginal bleeding, leakage of fluid, cramping, contractions, or pressure.   Total weight gain/weight loss in pregnancy: 9.526 kg (21 lb)     Vitals  BP: 115/75  Pulse: 65  Weight: 98 kg (216 lb)  Prenatal  Fundal Height (cm): 29 cm  Fetal Heart Rate: 136  Movement: Present  Urine Albumin/Glucose  Urine Albumin: 1+  Urine Glucose: Negative  Edema  LLE Edema: None  RLE Edema: None  Facial: None  Additional Edema?: No    Prenatal Labs:  Lab Results   Component Value Date    GROUPTRH B POS 2024    HGB 10.7 (L) 2024    HCT 33.5 (L) 2024     2024    SICKLE Negative 2024    HEPBSAG Non-Reactive 2024    [id number] Non-Reactive 2024    LABNGO Negative 2024    [id number] Positive (AA) 2021       A: 29w1d           ICD-10-CM ICD-9-CM    1. 29 weeks gestation of pregnancy  Z3A.29 V22.2 POCT URINALYSIS      Drug Screen Definitive 14, Urine      Glucose, 1Hr Post Prandial      CBC Auto Differential      Drug Screen Definitive 14, Urine          P: Bleeding, daily fetal kick counts, and  labor/labor precautions discussed.    The following were addressed during this visit:    29-32 Weeks  - Tdap Given   - Contraception/Tubal Consent   - Pre-registration   - Circumsision plans   - Op note review/ consent   - Birth Plan   - Pediatrician   - Fetal Kick Counts/PIH/PTL precautions   - Preeclampsia Education   - Quiet time     1 hr gtt today  Questions answered to desired level of satisfaction  Verbalized understanding to all information and instructions provided.  Follow up in about 2 weeks (around 2024), or if symptoms worsen or fail to improve, for SARAH Davey, DNP, CNM, WHNP-BC

## 2024-07-14 ENCOUNTER — HOSPITAL ENCOUNTER (OUTPATIENT)
Facility: HOSPITAL | Age: 24
LOS: 1 days | Discharge: HOME | End: 2024-07-15
Attending: OBSTETRICS & GYNECOLOGY | Admitting: OBSTETRICS & GYNECOLOGY
Payer: MEDICAID

## 2024-07-14 DIAGNOSIS — Z3A.29 29 WEEKS GESTATION OF PREGNANCY: ICD-10-CM

## 2024-07-14 DIAGNOSIS — O09.899 POSITIVE FETAL FIBRONECTIN AT 22 WEEKS TO 34 WEEKS GESTATION: Primary | ICD-10-CM

## 2024-07-14 DIAGNOSIS — O23.43 URINARY TRACT INFECTION IN MOTHER DURING THIRD TRIMESTER OF PREGNANCY: ICD-10-CM

## 2024-07-14 DIAGNOSIS — R87.89 POSITIVE FETAL FIBRONECTIN AT 22 WEEKS TO 34 WEEKS GESTATION: Primary | ICD-10-CM

## 2024-07-14 DIAGNOSIS — O26.893 PELVIC PAIN AFFECTING PREGNANCY IN THIRD TRIMESTER, ANTEPARTUM: ICD-10-CM

## 2024-07-14 DIAGNOSIS — R10.2 PELVIC PAIN AFFECTING PREGNANCY IN THIRD TRIMESTER, ANTEPARTUM: ICD-10-CM

## 2024-07-14 LAB
BACTERIA #/AREA URNS HPF: ABNORMAL /HPF
BILIRUB UR QL STRIP: NEGATIVE
CLARITY UR: ABNORMAL
COLOR UR: YELLOW
FIBRONECTIN FETAL SPEC QL: POSITIVE
GLUCOSE UR STRIP-MCNC: NORMAL MG/DL
KETONES UR STRIP-SCNC: NEGATIVE MG/DL
LEUKOCYTE ESTERASE UR QL STRIP: ABNORMAL
MUCOUS, UA: ABNORMAL /LPF
NITRITE UR QL STRIP: NEGATIVE
PH UR STRIP: 6.5 PH UNITS
PROT UR QL STRIP: 30
RBC # UR STRIP: NEGATIVE /UL
RBC #/AREA URNS HPF: 2 /HPF
SP GR UR STRIP: 1.03
SQUAMOUS #/AREA URNS LPF: ABNORMAL /HPF
UROBILINOGEN UR STRIP-ACNC: 3 MG/DL
WBC #/AREA URNS HPF: 21 /HPF

## 2024-07-14 PROCEDURE — G0378 HOSPITAL OBSERVATION PER HR: HCPCS

## 2024-07-14 PROCEDURE — 63600175 PHARM REV CODE 636 W HCPCS: Performed by: OBSTETRICS & GYNECOLOGY

## 2024-07-14 PROCEDURE — 87086 URINE CULTURE/COLONY COUNT: CPT | Performed by: OBSTETRICS & GYNECOLOGY

## 2024-07-14 PROCEDURE — 96372 THER/PROPH/DIAG INJ SC/IM: CPT | Performed by: OBSTETRICS & GYNECOLOGY

## 2024-07-14 PROCEDURE — 81001 URINALYSIS AUTO W/SCOPE: CPT | Performed by: OBSTETRICS & GYNECOLOGY

## 2024-07-14 PROCEDURE — 25000003 PHARM REV CODE 250: Performed by: OBSTETRICS & GYNECOLOGY

## 2024-07-14 PROCEDURE — 82731 ASSAY OF FETAL FIBRONECTIN: CPT | Performed by: OBSTETRICS & GYNECOLOGY

## 2024-07-14 PROCEDURE — 96361 HYDRATE IV INFUSION ADD-ON: CPT

## 2024-07-14 PROCEDURE — 96365 THER/PROPH/DIAG IV INF INIT: CPT

## 2024-07-14 PROCEDURE — 99285 EMERGENCY DEPT VISIT HI MDM: CPT | Mod: 25

## 2024-07-14 RX ORDER — SODIUM CHLORIDE 0.9 % (FLUSH) 0.9 %
10 SYRINGE (ML) INJECTION
Status: DISCONTINUED | OUTPATIENT
Start: 2024-07-14 | End: 2024-07-15 | Stop reason: HOSPADM

## 2024-07-14 RX ORDER — BETAMETHASONE SODIUM PHOSPHATE AND BETAMETHASONE ACETATE 3; 3 MG/ML; MG/ML
12 INJECTION, SUSPENSION INTRA-ARTICULAR; INTRALESIONAL; INTRAMUSCULAR; SOFT TISSUE
Status: DISCONTINUED | OUTPATIENT
Start: 2024-07-14 | End: 2024-07-15

## 2024-07-14 RX ORDER — SIMETHICONE 80 MG
1 TABLET,CHEWABLE ORAL EVERY 6 HOURS PRN
Status: DISCONTINUED | OUTPATIENT
Start: 2024-07-14 | End: 2024-07-15 | Stop reason: HOSPADM

## 2024-07-14 RX ORDER — ONDANSETRON 4 MG/1
8 TABLET, ORALLY DISINTEGRATING ORAL EVERY 8 HOURS PRN
Status: DISCONTINUED | OUTPATIENT
Start: 2024-07-14 | End: 2024-07-15 | Stop reason: HOSPADM

## 2024-07-14 RX ORDER — SODIUM CHLORIDE 9 MG/ML
INJECTION, SOLUTION INTRAVENOUS
Status: DISCONTINUED | OUTPATIENT
Start: 2024-07-14 | End: 2024-07-15 | Stop reason: HOSPADM

## 2024-07-14 RX ORDER — MUPIROCIN 20 MG/G
OINTMENT TOPICAL 2 TIMES DAILY
Status: DISCONTINUED | OUTPATIENT
Start: 2024-07-14 | End: 2024-07-15 | Stop reason: HOSPADM

## 2024-07-14 RX ORDER — PRENATAL WITH FERROUS FUM AND FOLIC ACID 3080; 920; 120; 400; 22; 1.84; 3; 20; 10; 1; 12; 200; 27; 25; 2 [IU]/1; [IU]/1; MG/1; [IU]/1; MG/1; MG/1; MG/1; MG/1; MG/1; MG/1; UG/1; MG/1; MG/1; MG/1; MG/1
1 TABLET ORAL DAILY
Status: DISCONTINUED | OUTPATIENT
Start: 2024-07-15 | End: 2024-07-15 | Stop reason: HOSPADM

## 2024-07-14 RX ORDER — DIPHENHYDRAMINE HCL 25 MG
25 CAPSULE ORAL EVERY 4 HOURS PRN
Status: DISCONTINUED | OUTPATIENT
Start: 2024-07-14 | End: 2024-07-15 | Stop reason: HOSPADM

## 2024-07-14 RX ORDER — SODIUM CHLORIDE 9 MG/ML
INJECTION, SOLUTION INTRAVENOUS ONCE
Status: DISCONTINUED | OUTPATIENT
Start: 2024-07-14 | End: 2024-07-14

## 2024-07-14 RX ORDER — DIPHENHYDRAMINE HYDROCHLORIDE 50 MG/ML
25 INJECTION INTRAMUSCULAR; INTRAVENOUS EVERY 4 HOURS PRN
Status: DISCONTINUED | OUTPATIENT
Start: 2024-07-14 | End: 2024-07-15 | Stop reason: HOSPADM

## 2024-07-14 RX ORDER — AMOXICILLIN 250 MG
1 CAPSULE ORAL NIGHTLY PRN
Status: DISCONTINUED | OUTPATIENT
Start: 2024-07-14 | End: 2024-07-15 | Stop reason: HOSPADM

## 2024-07-14 RX ORDER — ACETAMINOPHEN 325 MG/1
650 TABLET ORAL EVERY 6 HOURS PRN
Status: DISCONTINUED | OUTPATIENT
Start: 2024-07-14 | End: 2024-07-15 | Stop reason: HOSPADM

## 2024-07-14 RX ORDER — SODIUM CHLORIDE, SODIUM LACTATE, POTASSIUM CHLORIDE, CALCIUM CHLORIDE 600; 310; 30; 20 MG/100ML; MG/100ML; MG/100ML; MG/100ML
INJECTION, SOLUTION INTRAVENOUS CONTINUOUS
Status: DISCONTINUED | OUTPATIENT
Start: 2024-07-14 | End: 2024-07-15 | Stop reason: HOSPADM

## 2024-07-14 RX ADMIN — SODIUM CHLORIDE, POTASSIUM CHLORIDE, SODIUM LACTATE AND CALCIUM CHLORIDE: 600; 310; 30; 20 INJECTION, SOLUTION INTRAVENOUS at 07:07

## 2024-07-14 RX ADMIN — CEFTRIAXONE SODIUM 1 G: 1 INJECTION, POWDER, FOR SOLUTION INTRAMUSCULAR; INTRAVENOUS at 07:07

## 2024-07-14 RX ADMIN — MUPIROCIN: 20 OINTMENT TOPICAL at 08:07

## 2024-07-14 RX ADMIN — BETAMETHASONE SODIUM PHOSPHATE AND BETAMETHASONE ACETATE 12 MG: 3; 3 INJECTION, SUSPENSION INTRA-ARTICULAR; INTRALESIONAL; INTRAMUSCULAR at 06:07

## 2024-07-14 RX ADMIN — SODIUM CHLORIDE: 9 INJECTION, SOLUTION INTRAVENOUS at 07:07

## 2024-07-14 NOTE — ED NOTES
"Patient presents with c/o "lost mucus plug" last night and pressure/tugging sensation to lower abdomen, pelvis, and lower back since this morning. Denies contractions, LOF, and vaginal bleeding. Reports positive fetal movement. Last sexual activity "3 to 4 days ago". Denies further complaints or concerns. Dr. Boyce notified of pt arrival and complaint. Order rcvd to obtain FFN.   "

## 2024-07-14 NOTE — ED PROVIDER NOTES
Encounter Date: 2024    [facility] Physician: Maria A Boyce   Primary OBGYN:Chula Davey CNM    Admit Diagnosis/Chief Complaint:  abdominal and loss of mucous plug  History     Chief Complaint   Patient presents with    Abdominal Pain     And loss of mucus plug     This 25yo  patient of Chula Davey CNM at 29w4d presents to [facility] with complaint of losing mucous plug about 7pm yesterday.  She has had lower back and abdominal pain off and on since.  She denies bleeding or leakage of fluid.  She denies urinary complaints.   She denies other prenatal complications.    Her next appt with Chula is 24.      The history is provided by the patient.     Obstetric HPI:  Patient reports None contractions, active fetal movement, absent vaginal bleeding , absent loss of fluid      Objective:     Vital Signs (Most Recent):  Temp: 98.3 °F (36.8 °C) (24 1556)  Pulse: 83 (24 1556)  Resp: 16 (24 1556)  BP: (!) 123/58 (24 1556) Vital Signs (24h Range):  Temp:  [98.3 °F (36.8 °C)] 98.3 °F (36.8 °C)  Pulse:  [83] 83  Resp:  [16] 16  BP: (123)/(58) 123/58     Weight: 97.6 kg (215 lb 3.2 oz)  Body mass index is 33.71 kg/m².    FHT: normal range Cat 1 (reassuring)  TOCO:  none    No intake or output data in the 24 hours ending 24 1859    Cervical Exam:  Dilation:  0  Effacement:  0%  Station: -3  Presentation: Vertex     Significant Labs:  Recent Lab Results         24  1649   24  1635        Appearance, UA Turbid         Bacteria, UA Few         Bilirubin (UA) Negative         Color, UA Yellow         Fetal Fibronectin   Positive       Glucose, UA Normal         Ketones, UA Negative         Leukocyte Esterase, UA Large         Mucous Many         NITRITE UA Negative         Blood, UA Negative         pH, UA 6.5         Protein, UA 30         RBC, UA 2         Spec Grav UA 1.030         Squamous Epithelial Cells, UA Few         UROBILINOGEN UA 3         WBC, UA 21               Review of  patient's allergies indicates:  No Known Allergies  Past Medical History:   Diagnosis Date    Menorrhagia with irregular cycle 5/12/2022     History reviewed. No pertinent surgical history.  Family History   Problem Relation Name Age of Onset    Hypertension Mother       Social History     Tobacco Use    Smoking status: Never     Passive exposure: Never    Smokeless tobacco: Never   Substance Use Topics    Alcohol use: Never    Drug use: Never     Review of Systems   All other systems reviewed and are negative.      Physical Exam     Initial Vitals [07/14/24 1556]   BP Pulse Resp Temp SpO2   (!) 123/58 83 16 98.3 °F (36.8 °C) --      MAP       --         Physical Exam    Vitals reviewed.  Constitutional: She appears well-developed and well-nourished. She is not diaphoretic. No distress.   HENT:   Head: Normocephalic and atraumatic.   Eyes: EOM are normal.   Cardiovascular:  Normal rate.           Pulmonary/Chest: No respiratory distress.   Abdominal: Abdomen is soft. There is no abdominal tenderness.   Musculoskeletal:         General: Normal range of motion.     Neurological: She is alert and oriented to person, place, and time.   Skin: Skin is warm and dry.   Psychiatric: She has a normal mood and affect.         ED Course     Labs Reviewed   FETAL FIBRONECTIN - Abnormal; Notable for the following components:       Result Value    Fetal Fibronectin Positive (*)     All other components within normal limits   URINALYSIS, REFLEX TO URINE CULTURE - Abnormal; Notable for the following components:    Leukocytes, UA Large (*)     Protein, UA 30 (*)     Urobilinogen, UA 3 (*)     All other components within normal limits   URINALYSIS, MICROSCOPIC - Abnormal; Notable for the following components:    WBC, UA 21 (*)     Bacteria, UA Few (*)     Squamous Epithelial Cells, UA Few (*)     Mucous Many (*)     All other components within normal limits   CULTURE, URINE        Imaging Results    None          Medical Decision  Making  Checking UA and fetal fibronectin.  No monitored contractions. Will continue to monitor.   Further recommendations to follow.     ADDENDUM:    Fetal fibronectin is positive.   UA is positive.   Will admit for IV abx, BMZ course and observation.   Further intervention as indicated.     Risk  OTC drugs.  Prescription drug management.       Medications   sodium chloride 0.9% flush 10 mL (has no administration in time range)   mupirocin 2 % ointment (has no administration in time range)   acetaminophen tablet 650 mg (has no administration in time range)   ondansetron disintegrating tablet 8 mg (has no administration in time range)   senna-docusate 8.6-50 mg per tablet 1 tablet (has no administration in time range)   simethicone chewable tablet 80 mg (has no administration in time range)   diphenhydrAMINE capsule 25 mg (has no administration in time range)   diphenhydrAMINE injection 25 mg (has no administration in time range)   prenatal vitamin oral tablet (has no administration in time range)   cefTRIAXone (Rocephin) 1 g in D5W 100 mL IVPB (MB+) (has no administration in time range)   betamethasone acetate-betamethasone sodium phosphate injection 12 mg (12 mg Intramuscular Given 7/14/24 5823)   lactated ringers infusion (has no administration in time range)   0.9%  NaCl infusion (has no administration in time range)                              Clinical Impression:   Final diagnoses:  [O09.899, R87.89] Positive fetal fibronectin at 22 weeks to 34 weeks gestation (Primary)  [Z3A.29] 29 weeks gestation of pregnancy  [O26.893, R10.2] Pelvic pain affecting pregnancy in third trimester, antepartum  [O23.43] Urinary tract infection in mother during third trimester of pregnancy               Maria A Boyce DO  07/14/24 1900

## 2024-07-15 VITALS
TEMPERATURE: 97 F | WEIGHT: 215.19 LBS | RESPIRATION RATE: 20 BRPM | SYSTOLIC BLOOD PRESSURE: 127 MMHG | OXYGEN SATURATION: 98 % | DIASTOLIC BLOOD PRESSURE: 76 MMHG | HEART RATE: 77 BPM | HEIGHT: 67 IN | BODY MASS INDEX: 33.78 KG/M2

## 2024-07-15 PROBLEM — N30.00 ACUTE CYSTITIS WITHOUT HEMATURIA: Status: ACTIVE | Noted: 2024-07-15

## 2024-07-15 PROBLEM — O60.03 PRETERM LABOR WITHOUT DELIVERY, THIRD TRIMESTER: Status: ACTIVE | Noted: 2024-07-15

## 2024-07-15 PROCEDURE — 63600175 PHARM REV CODE 636 W HCPCS: Performed by: ADVANCED PRACTICE MIDWIFE

## 2024-07-15 PROCEDURE — G0378 HOSPITAL OBSERVATION PER HR: HCPCS

## 2024-07-15 PROCEDURE — 96361 HYDRATE IV INFUSION ADD-ON: CPT

## 2024-07-15 PROCEDURE — 96372 THER/PROPH/DIAG INJ SC/IM: CPT | Performed by: ADVANCED PRACTICE MIDWIFE

## 2024-07-15 PROCEDURE — 63600175 PHARM REV CODE 636 W HCPCS: Performed by: OBSTETRICS & GYNECOLOGY

## 2024-07-15 RX ORDER — NITROFURANTOIN 25; 75 MG/1; MG/1
100 CAPSULE ORAL 2 TIMES DAILY
Qty: 14 CAPSULE | Refills: 0 | Status: SHIPPED | OUTPATIENT
Start: 2024-07-15

## 2024-07-15 RX ORDER — NIFEDIPINE 10 MG/1
10 CAPSULE ORAL EVERY 4 HOURS
Qty: 180 CAPSULE | Refills: 3 | Status: SHIPPED | OUTPATIENT
Start: 2024-07-15 | End: 2024-08-14

## 2024-07-15 RX ORDER — BETAMETHASONE SODIUM PHOSPHATE AND BETAMETHASONE ACETATE 3; 3 MG/ML; MG/ML
12 INJECTION, SUSPENSION INTRA-ARTICULAR; INTRALESIONAL; INTRAMUSCULAR; SOFT TISSUE ONCE
Status: COMPLETED | OUTPATIENT
Start: 2024-07-15 | End: 2024-07-15

## 2024-07-15 RX ADMIN — SODIUM CHLORIDE, POTASSIUM CHLORIDE, SODIUM LACTATE AND CALCIUM CHLORIDE: 600; 310; 30; 20 INJECTION, SOLUTION INTRAVENOUS at 03:07

## 2024-07-15 RX ADMIN — BETAMETHASONE SODIUM PHOSPHATE AND BETAMETHASONE ACETATE 12 MG: 3; 3 INJECTION, SUSPENSION INTRA-ARTICULAR; INTRALESIONAL; INTRAMUSCULAR at 09:07

## 2024-07-15 NOTE — DISCHARGE SUMMARY
[facility] -  Labor and Delivery  Obstetrics  Discharge Summary      Patient Name: Nida Gold  MRN: [id number]  Admission Date: 2024  Hospital Length of Stay: 1 days  Discharge Date and Time:  07/15/2024 8:56 AM  Attending Physician: Marcos Byrne MD   Discharging Provider: Chula Davey CNM   Primary Care Provider: Kita, Primary Doctor    HPI: No notes on file    FHT: 140s Cat 1 (reassuring)  TOCO:  none    * No surgery found *     Hospital Course:   Pt is a 29 5/7 wks gestation for c/o passing a mucous plug. She denies cramping, pressure or pain upon admission. She did have a positive fetal fibronectin and diagnosed with a UTI. She received rocephin IV and celestone x 2 injections. She was Dc'd home on bedrest, procardia every 4 hours, and f/u in 1 week for GIULIA visit. She verbalizes understanding to all information and instructions.          Final Active Diagnoses:    Diagnosis Date Noted POA    PRINCIPAL PROBLEM:   labor without delivery, third trimester [O60.03] 07/15/2024 No    Acute cystitis without hematuria [N30.00] 07/15/2024 Yes      Problems Resolved During this Admission:        Significant Diagnostic Studies: N/A      Feeding Method: n/a    Immunizations       None            This patient has no babies on file.  Pending Diagnostic Studies:       None            Discharged Condition: good    Disposition: Home or Self Care    Follow Up:   Follow-up Information       Chula Davey CNM Follow up in 1 week(s).    Specialty: Obstetrics and Gynecology  Why: Return OB visit  Contact information:  [address] [facility] [address]  [phone]                           Patient Instructions:      Diet Adult Regular     Lifting restrictions   Order Comments: Don't lift anything heavier than your baby     No driving until:   Order Comments: Until seen by provider     Pelvic Rest   Order Comments: No sex, nothing in vagina until after 7-8 weeks post operation     Notify your  health care provider if you experience any of the following:  temperature >100.4     Notify your health care provider if you experience any of the following:  persistent nausea and vomiting or diarrhea     Notify your health care provider if you experience any of the following:  severe uncontrolled pain     Notify your health care provider if you experience any of the following:  difficulty breathing or increased cough     Notify your health care provider if you experience any of the following:  severe persistent headache     Notify your health care provider if you experience any of the following:  worsening rash     Notify your health care provider if you experience any of the following:  persistent dizziness, light-headedness, or visual disturbances     Activity as tolerated     Medications:  Current Discharge Medication List        START taking these medications    Details   NIFEdipine (PROCARDIA) 10 MG Cap Take 1 capsule (10 mg total) by mouth every 4 (four) hours.  Qty: 180 capsule, Refills: 3      nitrofurantoin, macrocrystal-monohydrate, (MACROBID) 100 MG capsule Take 1 capsule (100 mg total) by mouth 2 (two) times daily.  Qty: 14 capsule, Refills: 0           CONTINUE these medications which have NOT CHANGED    Details   albuterol (VENTOLIN HFA) 90 mcg/actuation inhaler Inhale 2 puffs into the lungs every 6 (six) hours as needed for Wheezing. Rescue  Qty: 18 g, Refills: 0      ferrous sulfate 325 (65 FE) MG EC tablet Take 1 tablet (325 mg total) by mouth 2 (two) times daily.  Qty: 60 tablet, Refills: 4    Associated Diagnoses: Anemia, unspecified type             Chula Davey CNM  Obstetrics  [facility] -  Labor and Delivery

## 2024-07-15 NOTE — HOSPITAL COURSE
Pt is a 29 5/7 wks gestation for c/o passing a mucous plug. She denies cramping, pressure or pain upon admission. She did have a positive fetal fibronectin and diagnosed with a UTI. She received rocephin IV and celestone x 2 injections. She was Dc'd home on bedrest, procardia every 4 hours, and f/u in 1 week for GIULIA visit. She verbalizes understanding to all information and instructions.

## 2024-07-15 NOTE — PLAN OF CARE
Problem:  Fall Injury Risk  Goal: Absence of Fall, Infant Drop and Related Injury  Outcome: Progressing     Problem: Adult Inpatient Plan of Care  Goal: Plan of Care Review  Outcome: Progressing  Goal: Patient-Specific Goal (Individualized)  Outcome: Progressing  Goal: Absence of Hospital-Acquired Illness or Injury  Outcome: Progressing  Goal: Optimal Comfort and Wellbeing  Outcome: Progressing  Goal: Readiness for Transition of Care  Outcome: Progressing

## 2024-07-16 LAB — UA COMPLETE W REFLEX CULTURE PNL UR: ABNORMAL

## 2024-07-18 ENCOUNTER — ROUTINE PRENATAL (OUTPATIENT)
Dept: OBSTETRICS AND GYNECOLOGY | Facility: CLINIC | Age: 24
End: 2024-07-18
Payer: MEDICAID

## 2024-07-18 VITALS
WEIGHT: 217.19 LBS | HEART RATE: 75 BPM | BODY MASS INDEX: 34.02 KG/M2 | SYSTOLIC BLOOD PRESSURE: 114 MMHG | DIASTOLIC BLOOD PRESSURE: 69 MMHG

## 2024-07-18 DIAGNOSIS — O60.03 PRETERM LABOR WITHOUT DELIVERY, THIRD TRIMESTER: ICD-10-CM

## 2024-07-18 DIAGNOSIS — Z3A.30 30 WEEKS GESTATION OF PREGNANCY: Primary | ICD-10-CM

## 2024-07-18 DIAGNOSIS — N30.00 ACUTE CYSTITIS WITHOUT HEMATURIA: ICD-10-CM

## 2024-07-18 LAB
BILIRUB SERPL-MCNC: NEGATIVE MG/DL
BLOOD, POC UA: NEGATIVE
GLUCOSE UR QL STRIP: NEGATIVE
KETONES UR QL STRIP: NEGATIVE
LEUKOCYTE ESTERASE URINE, POC: NORMAL
NITRITE, POC UA: NEGATIVE
PH, POC UA: 7
PROTEIN, POC: NORMAL
SPECIFIC GRAVITY, POC UA: 1.02
UROBILINOGEN, POC UA: 2

## 2024-07-18 PROCEDURE — 59426 ANTEPARTUM CARE ONLY: CPT | Mod: TH,,, | Performed by: ADVANCED PRACTICE MIDWIFE

## 2024-07-18 NOTE — PROGRESS NOTES
24 y.o. female  at 30w1d   She c/o some dizziness when taking procardia every 4 hrs- take procardia every 6 hrs.   Reports good fetal movement or fluttering. Denies any vaginal bleeding, leakage of fluid, cramping, contractions, or pressure.   Total weight gain/weight loss in pregnancy: 10.1 kg (22 lb 3.2 oz)     Vitals  BP: 114/69  Pulse: 75  Weight: 98.5 kg (217 lb 3.2 oz)  Prenatal  Fundal Height (cm): 30 cm  Fetal Heart Rate: 143  Movement: Present  Urine Albumin/Glucose  Urine Albumin: Negative  Urine Glucose: Negative  Edema  LLE Edema: None  RLE Edema: None  Facial: None  Additional Edema?: No    Prenatal Labs:  Lab Results   Component Value Date    GROUPTRH B POS 2024    HGB 9.4 (L) 2024    HCT 31.2 (L) 2024     2024    SICKLE Negative 2024    HEPBSAG Non-Reactive 2024    [id number] Non-Reactive 2024    LABNGO Negative 2024    LABURIN 13,000 Enterobacter cloacae (A) 2024    [id number] Positive (AA) 2021       A: 30w1d           ICD-10-CM ICD-9-CM    1. 30 weeks gestation of pregnancy  Z3A.30 V22.2 POCT URINALYSIS      2.  labor without delivery, third trimester  O60.03 644.03       3. Acute cystitis without hematuria  N30.00 595.0           P: Bleeding, daily fetal kick counts, and  labor/labor precautions discussed.    No pregnancy checklist tasks were completed during this visit, and no tasks are pending completion.      Questions answered to desired level of satisfaction  Verbalized understanding to all information and instructions provided.  Follow up in about 2 weeks (around 2024), or if symptoms worsen or fail to improve, for GIULIA, Ultrasound.    Chula Davey, JENNIFER, CNM, WHNP-BC

## 2024-07-29 ENCOUNTER — ROUTINE PRENATAL (OUTPATIENT)
Dept: OBSTETRICS AND GYNECOLOGY | Facility: CLINIC | Age: 24
End: 2024-07-29
Payer: MEDICAID

## 2024-07-29 ENCOUNTER — PROCEDURE VISIT (OUTPATIENT)
Dept: OBSTETRICS AND GYNECOLOGY | Facility: CLINIC | Age: 24
End: 2024-07-29
Payer: MEDICAID

## 2024-07-29 VITALS
WEIGHT: 208.81 LBS | BODY MASS INDEX: 32.7 KG/M2 | SYSTOLIC BLOOD PRESSURE: 128 MMHG | DIASTOLIC BLOOD PRESSURE: 77 MMHG | HEART RATE: 83 BPM

## 2024-07-29 DIAGNOSIS — Z3A.31 31 WEEKS GESTATION OF PREGNANCY: Primary | ICD-10-CM

## 2024-07-29 DIAGNOSIS — O60.03 PRETERM LABOR WITHOUT DELIVERY, THIRD TRIMESTER: ICD-10-CM

## 2024-07-29 DIAGNOSIS — N30.00 ACUTE CYSTITIS WITHOUT HEMATURIA: ICD-10-CM

## 2024-07-29 DIAGNOSIS — O36.8130 DECREASED FETAL MOVEMENTS IN THIRD TRIMESTER, SINGLE OR UNSPECIFIED FETUS: ICD-10-CM

## 2024-07-29 LAB
BILIRUB SERPL-MCNC: NEGATIVE MG/DL
BLOOD, POC UA: NORMAL
GLUCOSE UR QL STRIP: NEGATIVE
KETONES UR QL STRIP: NEGATIVE
LEUKOCYTE ESTERASE URINE, POC: NORMAL
NITRITE, POC UA: NEGATIVE
PH, POC UA: 6.5
PROTEIN, POC: NORMAL
SPECIFIC GRAVITY, POC UA: >=1.03
UROBILINOGEN, POC UA: 0.2

## 2024-07-29 PROCEDURE — 76819 FETAL BIOPHYS PROFIL W/O NST: CPT | Mod: ,,, | Performed by: OBSTETRICS & GYNECOLOGY

## 2024-07-29 PROCEDURE — 59426 ANTEPARTUM CARE ONLY: CPT | Mod: TH,,, | Performed by: ADVANCED PRACTICE MIDWIFE

## 2024-07-29 PROCEDURE — 76805 OB US >/= 14 WKS SNGL FETUS: CPT | Mod: ,,, | Performed by: OBSTETRICS & GYNECOLOGY

## 2024-07-29 PROCEDURE — 99499 UNLISTED E&M SERVICE: CPT | Mod: ,,, | Performed by: OBSTETRICS & GYNECOLOGY

## 2024-08-15 ENCOUNTER — ROUTINE PRENATAL (OUTPATIENT)
Dept: OBSTETRICS AND GYNECOLOGY | Facility: CLINIC | Age: 24
End: 2024-08-15
Payer: MEDICAID

## 2024-08-15 VITALS
HEART RATE: 73 BPM | SYSTOLIC BLOOD PRESSURE: 114 MMHG | WEIGHT: 220 LBS | DIASTOLIC BLOOD PRESSURE: 68 MMHG | BODY MASS INDEX: 34.46 KG/M2

## 2024-08-15 DIAGNOSIS — N30.00 ACUTE CYSTITIS WITHOUT HEMATURIA: ICD-10-CM

## 2024-08-15 DIAGNOSIS — O60.03 PRETERM LABOR WITHOUT DELIVERY, THIRD TRIMESTER: ICD-10-CM

## 2024-08-15 DIAGNOSIS — Z3A.34 34 WEEKS GESTATION OF PREGNANCY: Primary | ICD-10-CM

## 2024-08-15 NOTE — PROGRESS NOTES
"24 y.o. female  at 34w1d   She c/o back pain and pressure- she refuses a vaginal exam because "checking the cervix makes you dilate". Discussed signs of labor and contractions can increase chances of dilating.   Reports good fetal movement or fluttering. Denies any vaginal bleeding, leakage of fluid, cramping, contractions, or pressure.   Total weight gain/weight loss in pregnancy: 11.3 kg (25 lb)     Vitals  BP: 114/68  Pulse: 73  Weight: 99.8 kg (220 lb)  Prenatal  Fundal Height (cm): 35 cm  Fetal Heart Rate: 132  Movement: Present  Edema  LLE Edema: None  RLE Edema: None  Facial: None  Additional Edema?: No    Prenatal Labs:  Lab Results   Component Value Date    GROUPTRH B POS 2024    HGB 9.4 (L) 2024    HCT 31.2 (L) 2024     2024    SICKLE Negative 2024    HEPBSAG Non-Reactive 2024    [id number] Non-Reactive 2024    LABNGO Negative 2024    LABURIN 13,000 Enterobacter cloacae (A) 2024    [id number] Positive (AA) 2021       A: 34w1d           ICD-10-CM ICD-9-CM    1. 34 weeks gestation of pregnancy  Z3A.34 V22.2 POCT URINALYSIS      2.  labor without delivery, third trimester  O60.03 644.03       3. Acute cystitis without hematuria  N30.00 595.0           P: Bleeding, daily fetal kick counts, and  labor/labor precautions discussed.    The following were addressed during this visit:    33-36 Weeks  - Childbirth Education/Hospital Tours   - Breastfeeding   - Group B Strep Test/HIV/RPR   - Fetal Kick Counts/PIH/PTL precautions       Questions answered to desired level of satisfaction  Verbalized understanding to all information and instructions provided.  Follow up in about 1 week (around 2024), or if symptoms worsen or fail to improve, for SARAH Davey, JENNIFER, CNM, WHNP-BC                "

## 2024-08-22 ENCOUNTER — ROUTINE PRENATAL (OUTPATIENT)
Dept: OBSTETRICS AND GYNECOLOGY | Facility: CLINIC | Age: 24
End: 2024-08-22
Payer: MEDICAID

## 2024-08-22 VITALS
DIASTOLIC BLOOD PRESSURE: 76 MMHG | HEART RATE: 77 BPM | WEIGHT: 222.19 LBS | BODY MASS INDEX: 34.8 KG/M2 | SYSTOLIC BLOOD PRESSURE: 119 MMHG

## 2024-08-22 DIAGNOSIS — D64.9 ANEMIA, UNSPECIFIED TYPE: ICD-10-CM

## 2024-08-22 DIAGNOSIS — Z11.3 SCREEN FOR SEXUALLY TRANSMITTED DISEASES: ICD-10-CM

## 2024-08-22 DIAGNOSIS — Z3A.35 35 WEEKS GESTATION OF PREGNANCY: Primary | ICD-10-CM

## 2024-08-22 DIAGNOSIS — Z72.51 HIGH RISK HETEROSEXUAL BEHAVIOR: ICD-10-CM

## 2024-08-22 LAB
BASOPHILS # BLD AUTO: 0.03 K/UL (ref 0–0.2)
BASOPHILS NFR BLD AUTO: 0.4 % (ref 0–1)
BILIRUB SERPL-MCNC: NORMAL MG/DL
BLOOD, POC UA: NORMAL
CANDIDA SPECIES: NEGATIVE
CRENATED CELLS: ABNORMAL
DIFFERENTIAL METHOD BLD: ABNORMAL
EOSINOPHIL # BLD AUTO: 0.03 K/UL (ref 0–0.5)
EOSINOPHIL NFR BLD AUTO: 0.4 % (ref 1–4)
ERYTHROCYTE [DISTWIDTH] IN BLOOD BY AUTOMATED COUNT: 16.2 % (ref 11.5–14.5)
GARDNERELLA: POSITIVE
GLUCOSE UR QL STRIP: NORMAL
HCT VFR BLD AUTO: 30.3 % (ref 38–47)
HGB BLD-MCNC: 8.9 G/DL (ref 12–16)
IMM GRANULOCYTES # BLD AUTO: 0.02 K/UL (ref 0–0.04)
IMM GRANULOCYTES NFR BLD: 0.2 % (ref 0–0.4)
KETONES UR QL STRIP: NORMAL
LEUKOCYTE ESTERASE URINE, POC: NORMAL
LYMPHOCYTES # BLD AUTO: 1.45 K/UL (ref 1–4.8)
LYMPHOCYTES NFR BLD AUTO: 17.9 % (ref 27–41)
MCH RBC QN AUTO: 23.7 PG (ref 27–31)
MCHC RBC AUTO-ENTMCNC: 29.4 G/DL (ref 32–36)
MCV RBC AUTO: 80.8 FL (ref 80–96)
MICROCYTES BLD QL SMEAR: ABNORMAL
MONOCYTES # BLD AUTO: 0.57 K/UL (ref 0–0.8)
MONOCYTES NFR BLD AUTO: 7 % (ref 2–6)
MPC BLD CALC-MCNC: ABNORMAL G/DL
NEUTROPHILS # BLD AUTO: 5.99 K/UL (ref 1.8–7.7)
NEUTROPHILS NFR BLD AUTO: 74.1 % (ref 53–65)
NITRITE, POC UA: NORMAL
NRBC # BLD AUTO: 0 X10E3/UL
NRBC, AUTO (.00): 0 %
PH, POC UA: 7
PLATELET # BLD AUTO: 161 K/UL (ref 150–400)
PLATELET MORPHOLOGY: ABNORMAL
PROTEIN, POC: NORMAL
RBC # BLD AUTO: 3.75 M/UL (ref 4.2–5.4)
SPECIFIC GRAVITY, POC UA: 1.02
SYPHILIS AB INTERPRETATION: NORMAL
TRICHOMONAS: NEGATIVE
UROBILINOGEN, POC UA: 2
WBC # BLD AUTO: 8.09 K/UL (ref 4.5–11)

## 2024-08-22 PROCEDURE — 36415 COLL VENOUS BLD VENIPUNCTURE: CPT | Mod: ,,, | Performed by: ADVANCED PRACTICE MIDWIFE

## 2024-08-22 PROCEDURE — 87510 GARDNER VAG DNA DIR PROBE: CPT | Mod: ,,, | Performed by: CLINICAL MEDICAL LABORATORY

## 2024-08-22 PROCEDURE — 87480 CANDIDA DNA DIR PROBE: CPT | Mod: ,,, | Performed by: CLINICAL MEDICAL LABORATORY

## 2024-08-22 PROCEDURE — 87660 TRICHOMONAS VAGIN DIR PROBE: CPT | Mod: ,,, | Performed by: CLINICAL MEDICAL LABORATORY

## 2024-08-22 NOTE — PROGRESS NOTES
24 y.o. female  at 35w1d   She c/o pelvic discomforts  Reports good fetal movement or fluttering. Denies any vaginal bleeding, leakage of fluid, cramping, contractions, or pressure.   Total weight gain/weight loss in pregnancy: 12.3 kg (27 lb 3.2 oz)     Vitals  BP: 119/76  Pulse: 77  Weight: 100.8 kg (222 lb 3.2 oz)  Prenatal  Fundal Height (cm): 35 cm  Fetal Heart Rate: 148  Movement: Present  Urine Albumin/Glucose  Urine Albumin: Trace  Urine Glucose: Negative  Edema  LLE Edema: None  RLE Edema: None  Facial: None  Additional Edema?: No  Cervical Exam  Dilation: 1  Effacement (%): 0  Station: -3  Presentation: Vertex  Station (Labor Curve): 8 cm  Dilation/Effacement/Station  Dilation: 1  Effacement (%): 0  Station: -3    Prenatal Labs:  Lab Results   Component Value Date    GROUPTRH B POS 2024    HGB 9.4 (L) 2024    HCT 31.2 (L) 2024     2024    SICKLE Negative 2024    HEPBSAG Non-Reactive 2024    [id number] Non-Reactive 2024    LABNGO Negative 2024    LABURIN 13,000 Enterobacter cloacae (A) 2024    [id number] Positive (AA) 2021       A: 35w1d           ICD-10-CM ICD-9-CM    1. 35 weeks gestation of pregnancy  Z3A.35 V22.2 POCT URINALYSIS      Drug Screen Definitive 14, Urine      CBC Auto Differential      Drug Screen Definitive 14, Urine      CBC Auto Differential      2. Screen for sexually transmitted diseases  Z11.3 V74.5 Syphilis Antibody with reflex to RPR      Strep B Screen, Antepartum      Chlamydia/GC, PCR      Bacterial Vaginosis      Syphilis Antibody with reflex to RPR      3. High risk heterosexual behavior  Z72.51 V69.2 Syphilis Antibody with reflex to RPR      Strep B Screen, Antepartum      Chlamydia/GC, PCR      Bacterial Vaginosis      Syphilis Antibody with reflex to RPR      4. Anemia, unspecified type  D64.9 285.9           P: Bleeding, daily fetal kick counts, and  labor/labor precautions discussed.    No  pregnancy checklist tasks were completed during this visit, and no tasks are pending completion.      Questions answered to desired level of satisfaction  Verbalized understanding to all information and instructions provided.  Follow up in about 1 week (around 8/29/2024), or if symptoms worsen or fail to improve, for SARAH Davey, JENNIFER, CNM, WHNP-BC

## 2024-08-23 LAB
CHLAMYDIA BY PCR: NEGATIVE
GROUP B STREP, PCR: NEGATIVE
N. GONORRHOEAE (GC) BY PCR: NEGATIVE

## 2024-08-27 ENCOUNTER — HOSPITAL ENCOUNTER (EMERGENCY)
Facility: HOSPITAL | Age: 24
Discharge: HOME | End: 2024-08-27
Attending: OBSTETRICS & GYNECOLOGY
Payer: MEDICAID

## 2024-08-27 VITALS
DIASTOLIC BLOOD PRESSURE: 76 MMHG | SYSTOLIC BLOOD PRESSURE: 127 MMHG | HEIGHT: 67 IN | WEIGHT: 224 LBS | BODY MASS INDEX: 35.16 KG/M2 | TEMPERATURE: 97 F | RESPIRATION RATE: 17 BRPM | HEART RATE: 92 BPM | OXYGEN SATURATION: 99 %

## 2024-08-27 DIAGNOSIS — Z3A.35 35 WEEKS GESTATION OF PREGNANCY: Primary | ICD-10-CM

## 2024-08-27 DIAGNOSIS — O12.03 EDEMA DURING PREGNANCY IN THIRD TRIMESTER: ICD-10-CM

## 2024-08-27 DIAGNOSIS — O36.8131 DECREASED FETAL MOVEMENTS IN THIRD TRIMESTER, FETUS 1 OF MULTIPLE GESTATION: ICD-10-CM

## 2024-08-27 PROCEDURE — 59025 FETAL NON-STRESS TEST: CPT

## 2024-08-27 PROCEDURE — 99284 EMERGENCY DEPT VISIT MOD MDM: CPT | Mod: 25

## 2024-08-27 NOTE — ED NOTES
Rec'd ambulatory with c/o feet swelling after being up on them all day at work and some pressure off and on and decreased fetal mvt while at work. Gown on and assisted to bed.

## 2024-08-27 NOTE — Clinical Note
"Nida Lia" Asif was seen and treated in our emergency department on 8/27/2024.  She may return to work on 09/03/2024.       If you have any questions or concerns, please don't hesitate to call.      Tamika OLIVIA    "

## 2024-08-27 NOTE — ED NOTES
Assisted to bed and connected to EFM and NIBP. ABD soft and nontender. Fetal mvt palpated. Marker button given with instructions on NST, verb understanding. States pressure is off and on and noticed today that her baby hasn't moved as much. States ate before she got here. Denies problems with pregnancy. Assessment done, No LOF or vag bleeding noted. No edema noted. DTR's 2+. Denies pain.

## 2024-08-27 NOTE — ED PROVIDER NOTES
Encounter Date: 2024       History   No chief complaint on file.    Patient is a 24-year-old G3 P 2-0 0 2 at 35 and 6/7 weeks gestational age who presents with complaint of swelling of the feet along with decreased fetal movements; +fetal movement, no vaginal bleeding, no ruptured membranes, occasional contraction    Vital signs are stable the patient is afebrile      Past medical history-cystitis   Past surgical history-none   Allergies-no known drug allergies   Past OB history- x2 both at term  Past gyn history-no recent STDs or abnormal Paps  Medications-prenatal vitamins and iron   Social history-no alcohol, tobacco, or drugs      Fetal heart tones in the 130s and reactive/category 1  Tocometer shows rare contraction   Cervix-1/thick/-3        Review of patient's allergies indicates:  No Known Allergies  Past Medical History:   Diagnosis Date    Menorrhagia with irregular cycle 2022     No past surgical history on file.  Family History   Problem Relation Name Age of Onset    Hypertension Mother       Social History     Tobacco Use    Smoking status: Never     Passive exposure: Never    Smokeless tobacco: Never   Substance Use Topics    Alcohol use: Never    Drug use: Never     Review of Systems   Constitutional:  Positive for fatigue.   HENT:  Positive for congestion.    Eyes: Negative.    Respiratory: Negative.     Cardiovascular:  Positive for leg swelling.   Gastrointestinal:  Negative for abdominal pain, nausea and vomiting.   Endocrine: Negative.    Genitourinary:  Positive for frequency. Negative for pelvic pain and vaginal bleeding.   Musculoskeletal:  Negative for back pain.   Skin: Negative.    Neurological:  Negative for weakness and headaches.   Hematological: Negative.    Psychiatric/Behavioral: Negative.         Physical Exam     Initial Vitals [24 1649]   BP Pulse Resp Temp SpO2   127/76 92 17 97.4 °F (36.3 °C) 99 %      MAP       --         Physical Exam    Vitals  reviewed.  Constitutional: She appears well-developed and well-nourished.   HENT:   Head: Normocephalic and atraumatic.   Eyes: EOM are normal. Pupils are equal, round, and reactive to light.   Neck: Neck supple.   Normal range of motion.  Cardiovascular:  Normal rate and regular rhythm.           Pulmonary/Chest: Breath sounds normal.   Abdominal: Abdomen is soft.   Gravid uterus with no fundal tenderness   Genitourinary:    Vagina normal.      Genitourinary Comments: Cervix-1/thick/-3; no vaginal bleeding or leaking of fluid     Musculoskeletal:         General: Normal range of motion.      Cervical back: Normal range of motion and neck supple.      Comments: Mild lower extremity edema of ankles/feet     Neurological: She is alert and oriented to person, place, and time.   Skin: Skin is warm and dry.   Psychiatric: She has a normal mood and affect. Her behavior is normal.         ED Course   Procedures  Labs Reviewed - No data to display       Imaging Results    None          Medications - No data to display  Medical Decision Making                                    Clinical Impression:    Assessment and plan)   1. Intrauterine pregnancy at 35 and 6/7 weeks gestational age   2.-mild lower extremity edema with normal blood pressures  3-excellent gross fetal movement noted on Doppler with reactive NST  4-will discharge home with kick counts; recommend decreased activity until term; patient to follow-up with her OB as scheduled or return to observation for worsening symptoms or any other problems      Final diagnoses:  [Z3A.35] 35 weeks gestation of pregnancy (Primary)  [O36.8131] Decreased fetal movements in third trimester, fetus 1 of multiple gestation  [O12.03] Edema during pregnancy in third trimester          ED Disposition Condition    Discharge           ED Prescriptions    None       Follow-up Information    None          Mika Xavier MD  08/27/24 [id number]

## 2024-08-28 ENCOUNTER — HOSPITAL ENCOUNTER (EMERGENCY)
Facility: HOSPITAL | Age: 24
Discharge: HOME | End: 2024-08-28
Attending: OBSTETRICS & GYNECOLOGY
Payer: MEDICAID

## 2024-08-28 VITALS
HEART RATE: 91 BPM | DIASTOLIC BLOOD PRESSURE: 67 MMHG | RESPIRATION RATE: 18 BRPM | SYSTOLIC BLOOD PRESSURE: 126 MMHG | TEMPERATURE: 98 F

## 2024-08-28 DIAGNOSIS — O26.893 PELVIC PAIN IN PREGNANCY, ANTEPARTUM, THIRD TRIMESTER: ICD-10-CM

## 2024-08-28 DIAGNOSIS — O47.03 FALSE LABOR BEFORE 37 COMPLETED WEEKS OF GESTATION IN THIRD TRIMESTER: Primary | ICD-10-CM

## 2024-08-28 DIAGNOSIS — Z3A.36 36 WEEKS GESTATION OF PREGNANCY: ICD-10-CM

## 2024-08-28 DIAGNOSIS — R10.2 PELVIC PAIN IN PREGNANCY, ANTEPARTUM, THIRD TRIMESTER: ICD-10-CM

## 2024-08-28 LAB
BACTERIA #/AREA URNS HPF: ABNORMAL /HPF
BILIRUB UR QL STRIP: NEGATIVE
CLARITY UR: CLEAR
COLOR UR: ABNORMAL
GLUCOSE UR STRIP-MCNC: NORMAL MG/DL
HYALINE CASTS #/AREA URNS LPF: ABNORMAL /LPF
KETONES UR STRIP-SCNC: NEGATIVE MG/DL
LEUKOCYTE ESTERASE UR QL STRIP: ABNORMAL
NITRITE UR QL STRIP: NEGATIVE
PH UR STRIP: 7.5 PH UNITS
PROT UR QL STRIP: NEGATIVE
RBC # UR STRIP: NEGATIVE /UL
RBC #/AREA URNS HPF: 5 /HPF
SP GR UR STRIP: 1
SQUAMOUS #/AREA URNS LPF: ABNORMAL /HPF
UROBILINOGEN UR STRIP-ACNC: NORMAL MG/DL
WBC #/AREA URNS HPF: 26 /HPF

## 2024-08-28 PROCEDURE — 59025 FETAL NON-STRESS TEST: CPT | Mod: 59

## 2024-08-28 PROCEDURE — 81003 URINALYSIS AUTO W/O SCOPE: CPT | Performed by: OBSTETRICS & GYNECOLOGY

## 2024-08-28 PROCEDURE — 87086 URINE CULTURE/COLONY COUNT: CPT | Performed by: OBSTETRICS & GYNECOLOGY

## 2024-08-28 PROCEDURE — 99284 EMERGENCY DEPT VISIT MOD MDM: CPT | Mod: 25

## 2024-08-28 NOTE — ED NOTES
"Rec'd lying in bed AAOx3, pt presents w/ c/o contractions over the last hour, but states "they have stopped now".  Pt reports fetal movement, denies any bleeding or LOF.  Dr. Anderson here at [facility], verbal order rec'd for nurse SVE.  Will continue to monitor.  "

## 2024-08-28 NOTE — ED NOTES
Discharge instructions reviewed w/ pt et copies given along w/ labor precautions et [facility] instructions.  Pt voiced understanding to all instructions.

## 2024-08-28 NOTE — ED NOTES
Dr. Anderson reviewed EFM tracing et aware of BPP results, order rec'd to repeat cervical exam if unchanged then will d/c home.

## 2024-08-28 NOTE — ED PROVIDER NOTES
Encounter Date: 2024    AGUILAR Physician: Alec Anderson   Primary OBGYN:Chula Davey CNM    Admit Diagnosis/Chief Complaint: Contractions  History     Chief Complaint   Patient presents with    Abdominal Pain     Patient is a 24-year-old  3 para 2 who presents at 36 weeks and 0 days with complaints of uterine contractions and abdominal/pelvic pain.  She denies leakage of amniotic fluid or vaginal bleeding, headaches blurred vision nausea vomiting or decrease in fetal movement.        Obstetric HPI:  Patient reports occasional irregular contractions, active fetal movement, absent vaginal bleeding , absent loss of fluid      Objective:     Vital Signs (Most Recent):  Temp: 98.4 °F (36.9 °C) (24 1124)  Pulse: 91 (24 1124)  Resp: 18 (24 112)  BP: 126/67 (24 1124) Vital Signs (24h Range):  Temp:  [98.4 °F (36.9 °C)] 98.4 °F (36.9 °C)  Pulse:  [91] 91  Resp:  [18] 18  BP: (126)/(67) 126/67        There is no height or weight on file to calculate BMI.    FHT: 130  Cat 1 (reassuring)  TOCO:  Occasional irregular contractions noted    No intake or output data in the 24 hours ending 24    Cervical Exam: Per nurse x 2  Dilation:  1  Effacement:  0%  Station: -3  Presentation: Vertex     Significant Labs:  Recent Lab Results         24  1132        Appearance, UA Clear       Bacteria, UA Occasional       Bilirubin (UA) Negative       Color, UA Light-Yellow       Glucose, UA Normal       Hyaline Casts, UA 0-2       Ketones, UA Negative       Leukocyte Esterase, UA Large       NITRITE UA Negative       Blood, UA Negative       pH, UA 7.5       Protein, UA Negative       RBC, UA 5       Spec Grav UA 1.005       Squamous Epithelial Cells, UA Occasional       UROBILINOGEN UA Normal       WBC, UA 26             Review of patient's allergies indicates:  No Known Allergies  Past Medical History:   Diagnosis Date    Menorrhagia with irregular cycle 2022     History reviewed. No  pertinent surgical history.  Family History   Problem Relation Name Age of Onset    Hypertension Mother       Social History     Tobacco Use    Smoking status: Never     Passive exposure: Never    Smokeless tobacco: Never   Substance Use Topics    Alcohol use: Never    Drug use: Never     Review of Systems   Constitutional:  Negative for appetite change, chills, fatigue and fever.   HENT:  Negative for congestion.    Eyes:  Negative for visual disturbance.   Respiratory:  Negative for cough, chest tightness and shortness of breath.    Cardiovascular:  Negative for chest pain, palpitations and leg swelling.   Gastrointestinal:  Positive for abdominal pain. Negative for abdominal distention, blood in stool, constipation, diarrhea, nausea and vomiting.   Endocrine: Negative for cold intolerance, heat intolerance, polydipsia, polyphagia and polyuria.   Genitourinary:  Negative for difficulty urinating, dyspareunia, dysuria, flank pain, frequency, pelvic pain, urgency, vaginal bleeding, vaginal discharge and vaginal pain.   Musculoskeletal:  Positive for back pain. Negative for arthralgias.   Skin: Negative.    Neurological: Negative.    Psychiatric/Behavioral: Negative.  Negative for agitation, behavioral problems, confusion and sleep disturbance. The patient is not nervous/anxious.        Physical Exam     Initial Vitals [08/28/24 1124]   BP Pulse Resp Temp SpO2   126/67 91 18 98.4 °F (36.9 °C) --      MAP       --         Physical Exam    Nursing note and vitals reviewed.  Constitutional: She appears well-developed and well-nourished. No distress.   HENT:   Head: Normocephalic and atraumatic.   Nose: Nose normal.   Eyes: EOM are normal. Pupils are equal, round, and reactive to light.   Neck:   Normal range of motion.  Cardiovascular:  Normal rate.           Pulmonary/Chest: No respiratory distress.   Abdominal: Abdomen is soft. There is no abdominal tenderness.   Musculoskeletal:         General: No edema.       Cervical back: Normal range of motion.     Neurological: She is alert and oriented to person, place, and time.   Skin: Skin is warm and dry.   Psychiatric: She has a normal mood and affect. Thought content normal.         ED Course     Labs Reviewed   URINALYSIS - Abnormal       Result Value    Color, UA Light-Yellow      Clarity, UA Clear      pH, UA 7.5      Leukocytes, UA Large (*)     Nitrites, UA Negative      Protein, UA Negative      Glucose, UA Normal      Ketones, UA Negative      Urobilinogen, UA Normal      Bilirubin, UA Negative      Blood, UA Negative      Specific Alleman, UA 1.005     URINALYSIS, MICROSCOPIC - Abnormal    WBC, UA 26 (*)     RBC, UA 5 (*)     Bacteria, UA Occasional (*)     Squamous Epithelial Cells, UA Occasional (*)     Hyaline Casts, UA 0-2 (*)    CULTURE, URINE        Imaging Results              US OB Limited with Biophysical Profile (xpd) (Final result)  Result time 08/28/24 12:49:26      Final result by Bruno Ashley II, MD (08/28/24 12:49:26)                   Impression:      Biophysical profile 8 out of 8      Electronically signed by: Bruno Ashley  Date:    08/28/2024  Time:    12:49               Narrative:    EXAMINATION:  US OB LIMITED WITH BIOPHYSICAL PROFILE (XPD)    CLINICAL HISTORY:  decelerations;    TECHNIQUE:  Gray scale and color Doppler imaging performed transabdominal    COMPARISON:  16 May 2024    FINDINGS:  Single intrauterine gestation is seen in cephalic presentation. The placenta is located in fundal position and appears within normal limits.    There is normal fetal breathing motion, body movement and tone exhibited during the study.    Normal amount of amniotic fluid is seen. The amniotic fluid index is 9.96 cm.    Fetal heart rate measures 133 beats per minute.    Cervical length 3.7 cm                                  Biophysical profile 8/8 cervical length 3.7 cm DARRYL 9.96 cm     Medical Decision Making  Amount and/or Complexity of Data  Reviewed  Labs: ordered.  Radiology: ordered.       Medications - No data to display              ED Course as of 08/28/24 2054   Wed Aug 28, 2024   9535 24-year-old  presents at 36 weeks and 0 days with complaints of uterine contractions and labor.  She was seen yesterday with similar complaints.  She has no cervical change from yesterday's visit at 1 cm, 0% and -3 station.  She has a reactive nonstress test with an occasional uterine contraction noted.  Her biophysical profile was 8/8 with an DARRYL of 9.96 and cervical length of 3.70 cm.  There is no further change upon re-examination of her cervix.  Patient is being discharged home in stable condition with labor precautions discussed.  She will follow-up with her provider in 1 week or return to the OB ED as needed. [JA]      ED Course User Index  [JA] Alec Anderson MD                 Clinical Impression:   Final diagnoses:  [Z3A.36] 36 weeks gestation of pregnancy  [O47.03] False labor before 37 completed weeks of gestation in third trimester (Primary)  [O26.893, R10.2] Pelvic pain in pregnancy, antepartum, third trimester        ED Disposition Condition    Discharge Stable          ED Prescriptions    None       Follow-up Information       Follow up With Specialties Details Why Contact Info    Chula Davey CNM Obstetrics and Gynecology In 1 week   [address] [facility] [address]  [phone]               Alec Anderson MD  24

## 2024-08-28 NOTE — ED NOTES
Change in baseline auscultated, pt turned to left lateral side, then to R lateral side, FHR back to baseline of 130 at 1141, will notify Dr. Anderson

## 2024-08-31 LAB — UA COMPLETE W REFLEX CULTURE PNL UR: ABNORMAL

## 2024-09-04 ENCOUNTER — ROUTINE PRENATAL (OUTPATIENT)
Dept: OBSTETRICS AND GYNECOLOGY | Facility: CLINIC | Age: 24
End: 2024-09-04
Payer: MEDICAID

## 2024-09-04 VITALS
WEIGHT: 225.63 LBS | HEART RATE: 78 BPM | DIASTOLIC BLOOD PRESSURE: 73 MMHG | SYSTOLIC BLOOD PRESSURE: 109 MMHG | BODY MASS INDEX: 35.33 KG/M2

## 2024-09-04 DIAGNOSIS — O60.03 PRETERM LABOR WITHOUT DELIVERY, THIRD TRIMESTER: ICD-10-CM

## 2024-09-04 DIAGNOSIS — N30.00 ACUTE CYSTITIS WITHOUT HEMATURIA: ICD-10-CM

## 2024-09-04 DIAGNOSIS — N76.0 BV (BACTERIAL VAGINOSIS): ICD-10-CM

## 2024-09-04 DIAGNOSIS — Z3A.37 37 WEEKS GESTATION OF PREGNANCY: Primary | ICD-10-CM

## 2024-09-04 DIAGNOSIS — B96.89 BV (BACTERIAL VAGINOSIS): ICD-10-CM

## 2024-09-04 DIAGNOSIS — D50.8 OTHER IRON DEFICIENCY ANEMIA: ICD-10-CM

## 2024-09-04 PROBLEM — D50.9 IRON DEFICIENCY ANEMIA: Status: ACTIVE | Noted: 2024-09-04

## 2024-09-04 LAB
BILIRUB SERPL-MCNC: NEGATIVE MG/DL
BLOOD, POC UA: NEGATIVE
GLUCOSE UR QL STRIP: NEGATIVE
KETONES UR QL STRIP: NEGATIVE
LEUKOCYTE ESTERASE URINE, POC: NORMAL
NITRITE, POC UA: NEGATIVE
PH, POC UA: 7
PROTEIN, POC: 30
SPECIFIC GRAVITY, POC UA: 1.02
UROBILINOGEN, POC UA: 4

## 2024-09-04 PROCEDURE — 59426 ANTEPARTUM CARE ONLY: CPT | Mod: TH,,, | Performed by: ADVANCED PRACTICE MIDWIFE

## 2024-09-04 RX ORDER — METRONIDAZOLE 500 MG/1
500 TABLET ORAL 2 TIMES DAILY
Qty: 14 TABLET | Refills: 0 | Status: SHIPPED | OUTPATIENT
Start: 2024-09-04 | End: 2024-09-11

## 2024-09-04 RX ORDER — SODIUM CHLORIDE 0.9 % (FLUSH) 0.9 %
10 SYRINGE (ML) INJECTION
OUTPATIENT
Start: 2024-09-05

## 2024-09-04 RX ORDER — HEPARIN 100 UNIT/ML
500 SYRINGE INTRAVENOUS
OUTPATIENT
Start: 2024-09-05

## 2024-09-04 RX ORDER — DIPHENHYDRAMINE HYDROCHLORIDE 50 MG/ML
50 INJECTION INTRAMUSCULAR; INTRAVENOUS ONCE AS NEEDED
OUTPATIENT
Start: 2024-09-05

## 2024-09-04 RX ORDER — EPINEPHRINE 0.3 MG/.3ML
0.3 INJECTION SUBCUTANEOUS ONCE AS NEEDED
OUTPATIENT
Start: 2024-09-05

## 2024-09-04 NOTE — PROGRESS NOTES
"24 y.o. female  at 37w0d   She c/o not wanting to have a bowel movement due to being "scared".   Reports good fetal movement or fluttering. Denies any vaginal bleeding, leakage of fluid, cramping, contractions, or pressure.   Total weight gain/weight loss in pregnancy: 13.9 kg (30 lb 9.6 oz)     Vitals  BP: 109/73  Pulse: 78  Weight: 102.3 kg (225 lb 9.6 oz)  Prenatal  Fundal Height (cm): 37 cm  Fetal Heart Rate: 130  Movement: Present  Urine Albumin/Glucose  Urine Albumin: 1+  Urine Glucose: Negative  Edema  LLE Edema: None  RLE Edema: None  Facial: None  Additional Edema?: No    Prenatal Labs:  Lab Results   Component Value Date    GROUPTRH B POS 2024    HGB 8.9 (L) 2024    HCT 30.3 (L) 2024     2024    SICKLE Negative 2024    HEPBSAG Non-Reactive 2024    [id number] Non-Reactive 2024    LABNGO Negative 2024    LABURIN (A) 2024     12,000 Acinetobacter baumannii complex/haemolyticus    [id number] Positive (AA) 2021       A: 37w0d           ICD-10-CM ICD-9-CM    1. 37 weeks gestation of pregnancy  Z3A.37 V22.2 POCT URINALYSIS      2.  labor without delivery, third trimester  O60.03 644.03       3. Acute cystitis without hematuria  N30.00 595.0       4. Other iron deficiency anemia  D50.8 280.8       5. BV (bacterial vaginosis)  N76.0 616.10 metroNIDAZOLE (FLAGYL) 500 MG tablet    B96.89 041.9           P: Bleeding, daily fetal kick counts, and  labor/labor precautions discussed.    The following were addressed during this visit:    37-41 Weeks  - Postpartum Immunizations   - Hospital Stay Expectations   - When to go to the hospital   - Fetal kick counts/PIH/Bleeding/ROM/Labor precautions   - Labor induction policy   - Cervical ripening   - Breastfeeding review: benefits of breastfeeding, early skin to skin, 24/7 rooming in, exclusive breastfeeding for 6 months     Discussed iron infusion x 2  infusions  Questions answered to " desired level of satisfaction  Verbalized understanding to all information and instructions provided.  Follow up in about 1 week (around 9/11/2024), or if symptoms worsen or fail to improve, for SARAH Davey DNP, CNM, WHNP-BC

## 2024-09-10 ENCOUNTER — HOSPITAL ENCOUNTER (EMERGENCY)
Facility: HOSPITAL | Age: 24
Discharge: HOME | End: 2024-09-10
Attending: OBSTETRICS & GYNECOLOGY
Payer: MEDICAID

## 2024-09-10 VITALS
RESPIRATION RATE: 18 BRPM | BODY MASS INDEX: 34.21 KG/M2 | HEART RATE: 90 BPM | HEIGHT: 67 IN | SYSTOLIC BLOOD PRESSURE: 130 MMHG | WEIGHT: 218 LBS | TEMPERATURE: 98 F | DIASTOLIC BLOOD PRESSURE: 61 MMHG

## 2024-09-10 DIAGNOSIS — O99.891 STRESS INCONTINENCE IN PREGNANCY: Primary | ICD-10-CM

## 2024-09-10 DIAGNOSIS — Z3A.37 37 WEEKS GESTATION OF PREGNANCY: ICD-10-CM

## 2024-09-10 DIAGNOSIS — N39.3 STRESS INCONTINENCE IN PREGNANCY: Primary | ICD-10-CM

## 2024-09-10 PROCEDURE — 99284 EMERGENCY DEPT VISIT MOD MDM: CPT

## 2024-09-10 NOTE — DISCHARGE INSTRUCTIONS
Please be sure to keep all appointments with your OB provider. Return to OB ED for any worsening symptoms, vaginal bleeding, leakage of fluid, or decreased fetal movement.

## 2024-09-10 NOTE — ED NOTES
NST reactive and complete per Dr. Xavier, verbal dc order rec'd, pt dc'd from [facility]

## 2024-09-10 NOTE — ED PROVIDER NOTES
Encounter Date: 9/10/2024       History     Chief Complaint   Patient presents with    LOF     Patient is a 24-year-old G3 P 2-0 0 2 at 37 and 6/7 weeks gestational age who presents with complaint of her underwear being more damage than usual (question shimon leakage of fluid); +fetal movement, no vaginal bleeding, occasional contraction    Vital signs are stable the patient is afebrile    Past medical history-cystitis, anemia,  labor without  delivery  Past surgical history-none  Allergies-no known drug allergies   Past OB history- x2 (both term)   Past gyn history-recent BV infection (patient on Flagyl)   Medications-prenatal vitamins; Flagyl  Social history-no alcohol, tobacco, or drugs      Fetal heart tones-120s and reactive/category 1   Tocometer shows rare contraction  Cervix-long/thick/closed by SSE; no leaking of fluid or vaginal bleeding or vaginal pooling        Review of patient's allergies indicates:  No Known Allergies  Past Medical History:   Diagnosis Date    Menorrhagia with irregular cycle 2022     No past surgical history on file.  Family History   Problem Relation Name Age of Onset    Hypertension Mother       Social History     Tobacco Use    Smoking status: Never     Passive exposure: Never    Smokeless tobacco: Never   Substance Use Topics    Alcohol use: Never    Drug use: Never     Review of Systems   Constitutional:  Positive for fatigue.   HENT:  Positive for congestion.    Eyes: Negative.    Respiratory: Negative.     Cardiovascular: Negative.  Negative for leg swelling.   Gastrointestinal:  Negative for abdominal pain, nausea and vomiting.   Endocrine: Negative.    Genitourinary:  Positive for frequency and vaginal discharge. Negative for pelvic pain and vaginal bleeding.   Musculoskeletal:  Negative for back pain.   Skin: Negative.    Neurological:  Negative for weakness and headaches.   Hematological: Negative.    Psychiatric/Behavioral: Negative.         Physical  Exam     Initial Vitals [09/10/24 1127]   BP Pulse Resp Temp SpO2   130/61 90 18 98.1 °F (36.7 °C) --      MAP       --         Physical Exam    Vitals reviewed.  Constitutional: She appears well-developed and well-nourished.   HENT:   Head: Normocephalic and atraumatic.   Eyes: EOM are normal. Pupils are equal, round, and reactive to light.   Neck: Neck supple.   Normal range of motion.  Cardiovascular:  Normal rate and regular rhythm.           Pulmonary/Chest: Breath sounds normal.   Abdominal: Abdomen is soft.   Gravid uterus with no fundal tenderness   Genitourinary:    Vagina normal.      Genitourinary Comments: Cervix-long/thick/closed by SSE; no leakage of fluid or pooling; no vaginal bleeding     Musculoskeletal:      Cervical back: Normal range of motion and neck supple.     Neurological: She is alert and oriented to person, place, and time.   Skin: Skin is warm and dry.   Psychiatric: She has a normal mood and affect. Her behavior is normal.         ED Course   Procedures  Labs Reviewed - No data to display       Imaging Results    None          Medications - No data to display  Medical Decision Making                                    Clinical Impression:    1-intrauterine pregnancy at 37 and 6/7 weeks gestational age  2-stress incontinence of pregnancy - will discharge home with labor precautions; patient to follow-up with her OB as scheduled or return to observation for worsening symptoms or any other problems      Final diagnoses:  [Z3A.37] 37 weeks gestation of pregnancy  [O99.891, N39.3] Stress incontinence in pregnancy (Primary)          ED Disposition Condition    Discharge Stable          ED Prescriptions    None       Follow-up Information    None          Mika Xavier MD  09/10/24 1210

## 2024-09-10 NOTE — ED NOTES
Dc instructions reviewed with pt and AVS given. Pt instructed to return to OB ED for any worsening symptoms, vaginal bleeding, leakage of fluid, or decreased fetal movement. Pt verbalizes understanding. Pt dc'd per order.

## 2024-09-12 ENCOUNTER — ROUTINE PRENATAL (OUTPATIENT)
Dept: OBSTETRICS AND GYNECOLOGY | Facility: CLINIC | Age: 24
End: 2024-09-12
Payer: MEDICAID

## 2024-09-12 VITALS
DIASTOLIC BLOOD PRESSURE: 82 MMHG | BODY MASS INDEX: 35.37 KG/M2 | HEART RATE: 79 BPM | WEIGHT: 225.81 LBS | SYSTOLIC BLOOD PRESSURE: 127 MMHG

## 2024-09-12 DIAGNOSIS — Z34.90 ENCOUNTER FOR ELECTIVE INDUCTION OF LABOR: ICD-10-CM

## 2024-09-12 DIAGNOSIS — D50.8 OTHER IRON DEFICIENCY ANEMIA: ICD-10-CM

## 2024-09-12 DIAGNOSIS — Z3A.38 38 WEEKS GESTATION OF PREGNANCY: Primary | ICD-10-CM

## 2024-09-12 LAB
BILIRUB SERPL-MCNC: NEGATIVE MG/DL
BLOOD, POC UA: NEGATIVE
GLUCOSE UR QL STRIP: NEGATIVE
KETONES UR QL STRIP: NEGATIVE
LEUKOCYTE ESTERASE URINE, POC: NORMAL
NITRITE, POC UA: NEGATIVE
PH, POC UA: 7
PROTEIN, POC: NEGATIVE
SPECIFIC GRAVITY, POC UA: 1.02
UROBILINOGEN, POC UA: 1

## 2024-09-12 RX ORDER — SIMETHICONE 80 MG
1 TABLET,CHEWABLE ORAL 4 TIMES DAILY PRN
OUTPATIENT
Start: 2024-09-12

## 2024-09-12 RX ORDER — LIDOCAINE HYDROCHLORIDE 10 MG/ML
10 INJECTION, SOLUTION INFILTRATION; PERINEURAL ONCE AS NEEDED
OUTPATIENT
Start: 2024-09-12 | End: 2036-02-09

## 2024-09-12 RX ORDER — SODIUM CHLORIDE, SODIUM LACTATE, POTASSIUM CHLORIDE, CALCIUM CHLORIDE 600; 310; 30; 20 MG/100ML; MG/100ML; MG/100ML; MG/100ML
INJECTION, SOLUTION INTRAVENOUS CONTINUOUS
OUTPATIENT
Start: 2024-09-12

## 2024-09-12 RX ORDER — MISOPROSTOL 100 UG/1
800 TABLET ORAL ONCE AS NEEDED
OUTPATIENT
Start: 2024-09-12 | End: 2036-02-09

## 2024-09-12 RX ORDER — ONDANSETRON 4 MG/1
8 TABLET, ORALLY DISINTEGRATING ORAL EVERY 8 HOURS PRN
OUTPATIENT
Start: 2024-09-12

## 2024-09-12 RX ORDER — OXYTOCIN 10 [USP'U]/ML
10 INJECTION, SOLUTION INTRAMUSCULAR; INTRAVENOUS ONCE AS NEEDED
OUTPATIENT
Start: 2024-09-12 | End: 2036-02-09

## 2024-09-12 RX ORDER — CARBOPROST TROMETHAMINE 250 UG/ML
250 INJECTION, SOLUTION INTRAMUSCULAR
OUTPATIENT
Start: 2024-09-12

## 2024-09-12 RX ORDER — MISOPROSTOL 100 MCG
50 TABLET ORAL EVERY 6 HOURS
OUTPATIENT
Start: 2024-09-18 | End: 2024-09-19

## 2024-09-12 RX ORDER — OXYTOCIN-SODIUM CHLORIDE 0.9% IV SOLN 30 UNIT/500ML 30-0.9/5 UT/ML-%
10 SOLUTION INTRAVENOUS ONCE AS NEEDED
OUTPATIENT
Start: 2024-09-12 | End: 2036-02-09

## 2024-09-12 RX ORDER — MISOPROSTOL 100 UG/1
800 TABLET ORAL ONCE AS NEEDED
OUTPATIENT
Start: 2024-09-12

## 2024-09-12 RX ORDER — METHYLERGONOVINE MALEATE 0.2 MG/ML
200 INJECTION INTRAVENOUS ONCE AS NEEDED
OUTPATIENT
Start: 2024-09-12 | End: 2036-02-09

## 2024-09-12 RX ORDER — TERBUTALINE SULFATE 1 MG/ML
0.25 INJECTION SUBCUTANEOUS
OUTPATIENT
Start: 2024-09-12

## 2024-09-12 RX ORDER — SODIUM CHLORIDE 9 MG/ML
INJECTION, SOLUTION INTRAVENOUS
OUTPATIENT
Start: 2024-09-12

## 2024-09-12 RX ORDER — OXYTOCIN-SODIUM CHLORIDE 0.9% IV SOLN 30 UNIT/500ML 30-0.9/5 UT/ML-%
20 SOLUTION INTRAVENOUS ONCE AS NEEDED
OUTPATIENT
Start: 2024-09-12 | End: 2036-02-09

## 2024-09-12 RX ORDER — ACETAMINOPHEN 325 MG/1
650 TABLET ORAL EVERY 6 HOURS PRN
OUTPATIENT
Start: 2024-09-12

## 2024-09-12 RX ORDER — DIPHENOXYLATE HYDROCHLORIDE AND ATROPINE SULFATE 2.5; .025 MG/1; MG/1
2 TABLET ORAL EVERY 6 HOURS PRN
OUTPATIENT
Start: 2024-09-12

## 2024-09-12 RX ORDER — CALCIUM CARBONATE 200(500)MG
500 TABLET,CHEWABLE ORAL 3 TIMES DAILY PRN
OUTPATIENT
Start: 2024-09-12

## 2024-09-12 NOTE — H&P
History and Physical  Obstetrics      SUBJECTIVE:     Nida Gold is a 24 y.o.  female with an Estimated Date of Delivery: 24 admitted for induction of labor.  Her current obstetrical history is uneventful.  She reports no complaints. Fetal Movement: normal.    (Not in a hospital admission)      Review of patient's allergies indicates:  No Known Allergies     Past Medical History:   Diagnosis Date    Menorrhagia with irregular cycle 2022     History reviewed. No pertinent surgical history.  Family History   Problem Relation Name Age of Onset    Hypertension Mother       Social History     Tobacco Use    Smoking status: Never     Passive exposure: Never    Smokeless tobacco: Never   Substance Use Topics    Alcohol use: Never    Drug use: Never        OBJECTIVE:     Vital Signs (Most Recent)  Pulse: 79 (24 0802)  BP: 127/82 (24 0802)    Vitals  BP: 127/82  Pulse: 79  Weight: 102.4 kg (225 lb 12.8 oz)  Prenatal  Fundal Height (cm): 38 cm  Fetal Heart Rate: 130  Movement: Present  Urine Albumin/Glucose  Urine Albumin: Negative  Urine Glucose: Negative  Edema  LLE Edema: None  RLE Edema: None  Facial: None  Additional Edema?: No  Cervical Exam  Dilation: 2  Effacement (%): 50  Station: -3  Presentation: Vertex  Station (Labor Curve): 8 cm  Dilation/Effacement/Station  Dilation: 2  Effacement (%): 50  Station: -3    Physical Exam:  General:  alert, normal appearing gravid female, cooperative, appears stated age, and no distress   Skin:  Skin color, texture, turgor normal. No rashes or lesions   HEENT:  neck supple with midline trachea, thyroid without masses, and trachea midline   Lungs:  clear to auscultation bilaterally   Heart:  regular rate and rhythm, S1, S2 normal, no murmur, click, rub or gallop   Breasts:   deferred   Abdomen:  soft, non-tender; bowel sounds normal   Uterine Size:   38 cm and size equals dates   Presentations:  cephalic   Pelvis: adequate     Laboratory:  Lab  Results   Component Value Date    GROUPTRH B POS 2024    HGB 8.9 (L) 2024    HCT 30.3 (L) 2024     2024    SICKLE Negative 2024    HEPBSAG Non-Reactive 2024    [id number] Non-Reactive 2024    LABNGO Negative 2024    LABURIN (A) 2024     12,000 Acinetobacter baumannii complex/haemolyticus    [id number] Positive (AA) 2021       ASSESSMENT/PLAN:     38w1d gestation.  Elective Induction of labor   Conditions: N/A     Risks, benefits, alternatives and possible complications have been discussed in detail with the patient.  Pre-admission, admission, and post admission procedures and expectations were discussed in detail.  All questions answered to desired level of satisfaction. Admission is planned for next week.   Risks of induction discussed to include hyperstimulation, non reassuring FHTs, abruption, fetal demise, uterine rupture, risk of induction failure, risk of  section and/or possible surgical interventions to stop bleeding to include dilatation and curretage or hysterectomy.   Pt verbalized understanding and desires to proceed with an elective induction of labor  Obtain admission labs  May have analgesics if desired  May have epidural if desired  Reposition for comfort  Verbalized understanding to all instructions and information  Questions answered to desired level of satisfaction

## 2024-09-12 NOTE — PROCEDURES
OB ultrasound Note:    BPD- 33 weeks 2 day  HC-34 weeks 2 day  AC- 32 weeks 4 day  FL- 32 week 6 day    DARRYL- 10.6 cm    Fetal heart rate:  138 bpm    Fetal position- vertex  Placental location- posterior    Impression-    XOCHITL September 14, 2024  Estimated gestational age 33 weeks 2 day  EFW 2072 g (4 lb 9 oz)  Pctl ( EFW) 38 th percentile      Biophysical Profile:    Fetal Movements- 2  Fetal breathing- 2  Fetal Tone- 2  Amniotic Fluid Volume- 2    Total - 8

## 2024-09-12 NOTE — PROGRESS NOTES
24 y.o. female  at 38w1d   She c/o occasional cramping and pressure  Reports good fetal movement or fluttering. Denies any vaginal bleeding, leakage of fluid or pressure. She requests to be induced.  Total weight gain/weight loss in pregnancy: 14 kg (30 lb 12.8 oz)     Vitals  BP: 127/82  Pulse: 79  Weight: 102.4 kg (225 lb 12.8 oz)  Prenatal  Fundal Height (cm): 38 cm  Fetal Heart Rate: 130  Movement: Present  Urine Albumin/Glucose  Urine Albumin: Negative  Urine Glucose: Negative  Edema  LLE Edema: None  RLE Edema: None  Facial: None  Additional Edema?: No  Cervical Exam  Dilation: 2  Effacement (%): 50  Station: -3  Presentation: Vertex  Station (Labor Curve): 8 cm  Dilation/Effacement/Station  Dilation: 2  Effacement (%): 50  Station: -3    Prenatal Labs:  Lab Results   Component Value Date    GROUPTRH B POS 2024    HGB 8.9 (L) 2024    HCT 30.3 (L) 2024     2024    SICKLE Negative 2024    HEPBSAG Non-Reactive 2024    [id number] Non-Reactive 2024    LABNGO Negative 2024    LABURIN (A) 2024     12,000 Acinetobacter baumannii complex/haemolyticus    [id number] Positive (AA) 2021       A: 38w1d           ICD-10-CM ICD-9-CM    1. 38 weeks gestation of pregnancy  Z3A.38 V22.2 POCT URINALYSIS      IP OB Labor Induction      2. Other iron deficiency anemia  D50.8 280.8       3. Encounter for elective induction of labor  Z34.90 V22.1 IP OB Labor Induction      CANCELED: IP OB Labor Induction          P: Bleeding, daily fetal kick counts, and  labor/labor precautions discussed.    No pregnancy checklist tasks were completed during this visit, and no tasks are pending completion.    Risks of induction discussed to include hyperstimulation, non reassuring FHTs, abruption, fetal demise, uterine rupture, risk of induction failure, risk of  section and/or possible surgical interventions to stop bleeding to include dilatation and curretage  or hysterectomy.   Induction scheduled for   Questions answered to desired level of satisfaction  Verbalized understanding to all information and instructions provided.  Follow up in about 3 weeks (around 10/3/2024), or if symptoms worsen or fail to improve, for postpartum visit/exam.    Chula Davey, DNP, CNM, WHNP-BC

## 2024-09-12 NOTE — H&P (VIEW-ONLY)
History and Physical  Obstetrics      SUBJECTIVE:     Nida Gold is a 24 y.o.  female with an Estimated Date of Delivery: 24 admitted for induction of labor.  Her current obstetrical history is uneventful.  She reports no complaints. Fetal Movement: normal.    (Not in a hospital admission)      Review of patient's allergies indicates:  No Known Allergies     Past Medical History:   Diagnosis Date    Menorrhagia with irregular cycle 2022     History reviewed. No pertinent surgical history.  Family History   Problem Relation Name Age of Onset    Hypertension Mother       Social History     Tobacco Use    Smoking status: Never     Passive exposure: Never    Smokeless tobacco: Never   Substance Use Topics    Alcohol use: Never    Drug use: Never        OBJECTIVE:     Vital Signs (Most Recent)  Pulse: 79 (24 0802)  BP: 127/82 (24 0802)    Vitals  BP: 127/82  Pulse: 79  Weight: 102.4 kg (225 lb 12.8 oz)  Prenatal  Fundal Height (cm): 38 cm  Fetal Heart Rate: 130  Movement: Present  Urine Albumin/Glucose  Urine Albumin: Negative  Urine Glucose: Negative  Edema  LLE Edema: None  RLE Edema: None  Facial: None  Additional Edema?: No  Cervical Exam  Dilation: 2  Effacement (%): 50  Station: -3  Presentation: Vertex  Station (Labor Curve): 8 cm  Dilation/Effacement/Station  Dilation: 2  Effacement (%): 50  Station: -3    Physical Exam:  General:  alert, normal appearing gravid female, cooperative, appears stated age, and no distress   Skin:  Skin color, texture, turgor normal. No rashes or lesions   HEENT:  neck supple with midline trachea, thyroid without masses, and trachea midline   Lungs:  clear to auscultation bilaterally   Heart:  regular rate and rhythm, S1, S2 normal, no murmur, click, rub or gallop   Breasts:   deferred   Abdomen:  soft, non-tender; bowel sounds normal   Uterine Size:   38 cm and size equals dates   Presentations:  cephalic   Pelvis: adequate     Laboratory:  Lab  Results   Component Value Date    GROUPTRH B POS 2024    HGB 8.9 (L) 2024    HCT 30.3 (L) 2024     2024    SICKLE Negative 2024    HEPBSAG Non-Reactive 2024    [id number] Non-Reactive 2024    LABNGO Negative 2024    LABURIN (A) 2024     12,000 Acinetobacter baumannii complex/haemolyticus    [id number] Positive (AA) 2021       ASSESSMENT/PLAN:     38w1d gestation.  Elective Induction of labor   Conditions: N/A     Risks, benefits, alternatives and possible complications have been discussed in detail with the patient.  Pre-admission, admission, and post admission procedures and expectations were discussed in detail.  All questions answered to desired level of satisfaction. Admission is planned for next week.   Risks of induction discussed to include hyperstimulation, non reassuring FHTs, abruption, fetal demise, uterine rupture, risk of induction failure, risk of  section and/or possible surgical interventions to stop bleeding to include dilatation and curretage or hysterectomy.   Pt verbalized understanding and desires to proceed with an elective induction of labor  Obtain admission labs  May have analgesics if desired  May have epidural if desired  Reposition for comfort  Verbalized understanding to all instructions and information  Questions answered to desired level of satisfaction

## 2024-09-13 ENCOUNTER — INFUSION (OUTPATIENT)
Dept: INFUSION THERAPY | Facility: HOSPITAL | Age: 24
End: 2024-09-13
Attending: OBSTETRICS & GYNECOLOGY
Payer: MEDICAID

## 2024-09-13 ENCOUNTER — HOSPITAL ENCOUNTER (EMERGENCY)
Facility: HOSPITAL | Age: 24
Discharge: HOME | End: 2024-09-13
Attending: OBSTETRICS & GYNECOLOGY
Payer: MEDICAID

## 2024-09-13 VITALS
HEART RATE: 65 BPM | WEIGHT: 233.19 LBS | RESPIRATION RATE: 20 BRPM | OXYGEN SATURATION: 97 % | HEART RATE: 66 BPM | SYSTOLIC BLOOD PRESSURE: 119 MMHG | DIASTOLIC BLOOD PRESSURE: 74 MMHG | OXYGEN SATURATION: 99 % | TEMPERATURE: 98 F | HEIGHT: 67 IN | RESPIRATION RATE: 17 BRPM | DIASTOLIC BLOOD PRESSURE: 66 MMHG | SYSTOLIC BLOOD PRESSURE: 120 MMHG | BODY MASS INDEX: 36.6 KG/M2

## 2024-09-13 DIAGNOSIS — O99.213 OBESITY AFFECTING PREGNANCY IN THIRD TRIMESTER, UNSPECIFIED OBESITY TYPE: ICD-10-CM

## 2024-09-13 DIAGNOSIS — D50.8 OTHER IRON DEFICIENCY ANEMIA: Primary | ICD-10-CM

## 2024-09-13 DIAGNOSIS — Z3A.38 38 WEEKS GESTATION OF PREGNANCY: ICD-10-CM

## 2024-09-13 DIAGNOSIS — O99.013 MATERNAL IRON DEFICIENCY ANEMIA AFFECTING PREGNANCY IN THIRD TRIMESTER, ANTEPARTUM: ICD-10-CM

## 2024-09-13 DIAGNOSIS — D50.9 MATERNAL IRON DEFICIENCY ANEMIA AFFECTING PREGNANCY IN THIRD TRIMESTER, ANTEPARTUM: ICD-10-CM

## 2024-09-13 DIAGNOSIS — O47.9: Primary | ICD-10-CM

## 2024-09-13 PROCEDURE — 96365 THER/PROPH/DIAG IV INF INIT: CPT

## 2024-09-13 PROCEDURE — 25000003 PHARM REV CODE 250: Performed by: ADVANCED PRACTICE MIDWIFE

## 2024-09-13 PROCEDURE — 99284 EMERGENCY DEPT VISIT MOD MDM: CPT | Mod: 25

## 2024-09-13 PROCEDURE — 63600175 PHARM REV CODE 636 W HCPCS: Mod: UD | Performed by: ADVANCED PRACTICE MIDWIFE

## 2024-09-13 RX ORDER — SODIUM CHLORIDE 0.9 % (FLUSH) 0.9 %
10 SYRINGE (ML) INJECTION
OUTPATIENT
Start: 2024-09-20

## 2024-09-13 RX ORDER — EPINEPHRINE 0.3 MG/.3ML
0.3 INJECTION SUBCUTANEOUS ONCE AS NEEDED
OUTPATIENT
Start: 2024-09-20

## 2024-09-13 RX ORDER — SODIUM CHLORIDE 0.9 % (FLUSH) 0.9 %
10 SYRINGE (ML) INJECTION
Status: DISCONTINUED | OUTPATIENT
Start: 2024-09-13 | End: 2024-09-13 | Stop reason: HOSPADM

## 2024-09-13 RX ORDER — DIPHENHYDRAMINE HYDROCHLORIDE 50 MG/ML
50 INJECTION INTRAMUSCULAR; INTRAVENOUS ONCE AS NEEDED
OUTPATIENT
Start: 2024-09-20

## 2024-09-13 RX ORDER — HEPARIN 100 UNIT/ML
500 SYRINGE INTRAVENOUS
OUTPATIENT
Start: 2024-09-20

## 2024-09-13 RX ADMIN — SODIUM CHLORIDE 125 MG: 9 INJECTION, SOLUTION INTRAVENOUS at 08:09

## 2024-09-13 NOTE — ED NOTES
Notified Dr. Boyce pt presents after completing iron infusion at infusion center for c/o mild ctxs. Pt is  at apprx 38.2 IUP wk gest with hx 2 previous vaginal deliveries. SVE 2/thick/high. Dr. Boyce to evaluate pt.

## 2024-09-13 NOTE — ED NOTES
Dc instructions discussed with pt. Pt. Voiced understanding of all instructions. Pt to keep fu as previously scheduled. Copy of dc papers given to pt.

## 2024-09-13 NOTE — PROGRESS NOTES
0750 Pt here for Ferrlecit infusion, resting in recliner, TP released and pharmacy notified  0806 Ferrlecit infusion started to infuse over 1 hr  Pt states that she is having mild contractions, Chula Davey CN states that she needs to go to labor and delivery after infusion to be checked  0913 Ferrlecit infusion complete, tolerated well, will continue to monitor  0920 pt discharged via wheel chair to labor and delivery to be checked, IV left in place  Follow up appt made 1 week, pt will call if she ends up not being able to come in

## 2024-09-13 NOTE — ED NOTES
Pt recd per wc from infusion center for c/o mild ctxs. Pt placed in [facility] 3. Pt connected to EFM. Pt denies vaginal bleeding or leakage. Pt states + fetal movement. Assess Wnl.

## 2024-09-13 NOTE — ED PROVIDER NOTES
Encounter Date: 2024    [facility] Physician: Maria A Boyce   Primary OBGYN:Chula Davey CNM    Admit Diagnosis/Chief Complaint:  Cleveland-Baum contra  History     Chief Complaint   Patient presents with    Contractions     This 23yo  patient of Chula Davey CNM at 38w2d was sent over from infusion center after receiving IV Iron today because of her complaints of having B-H contractions.  Pt states she knows she is not in labor.  She denies other major healthcare issues this pregnancy.         Obstetric HPI:  Patient reports Cleveland-Baum contractions, active fetal movement, absent vaginal bleeding , absent loss of fluid      Objective:     Vital Signs (Most Recent):  Temp: 98.2 °F (36.8 °C) (24)  Pulse: 66 (24)  Resp: 20 (24)  BP: 119/66 (24)  SpO2: 97 % (24) Vital Signs (24h Range):  Temp:  [98.2 °F (36.8 °C)] 98.2 °F (36.8 °C)  Pulse:  [63-75] 66  Resp:  [17-20] 20  SpO2:  [97 %-99 %] 97 %  BP: (119-124)/(66-80) 119/66     Weight: 105.8 kg (233 lb 3.2 oz)  Body mass index is 36.52 kg/m².    FHT: normal range Cat 1 (reassuring)  TOCO:  irregular    No intake or output data in the 24 hours ending 24    Cervical Exam:  Dilation:  2  Effacement:  0%  Station: -3  Presentation: Vertex     Significant Labs:  Recent Lab Results       None          Review of patient's allergies indicates:  No Known Allergies  Past Medical History:   Diagnosis Date    Menorrhagia with irregular cycle 2022     No past surgical history on file.  Family History   Problem Relation Name Age of Onset    Hypertension Mother       Social History     Tobacco Use    Smoking status: Never     Passive exposure: Never    Smokeless tobacco: Never   Substance Use Topics    Alcohol use: Never    Drug use: Never     Review of Systems   All other systems reviewed and are negative.      Physical Exam     Initial Vitals [24]   BP Pulse Resp Temp SpO2   119/66 66 20 98.2  °F (36.8 °C) 97 %      MAP       --         Physical Exam    Constitutional: She appears well-developed and well-nourished. She is not diaphoretic. No distress.   HENT:   Head: Normocephalic and atraumatic.   Eyes: EOM are normal.   Cardiovascular:  Normal rate.           Pulmonary/Chest: No respiratory distress.     Neurological: She is alert and oriented to person, place, and time.   Skin: Skin is warm and dry.   Psychiatric: She has a normal mood and affect.         ED Course   Labs Reviewed - No data to display     Imaging Results    None          Medical Decision Making  Pt discharge with instruction for FKCs, Labor precautions.   Keep appt for next week for induction as scheduled.   Return to [facility] prn       Medications - No data to display                           Clinical Impression:   Final diagnoses:  [O47.9] Labor, false (Morris-Baum), antepartum (Primary)  [Z3A.38] 38 weeks gestation of pregnancy  [O99.013, D50.9] Maternal iron deficiency anemia affecting pregnancy in third trimester, antepartum  [O99.213] Obesity affecting pregnancy in third trimester, unspecified obesity type               Maria A Boyce,   09/13/24 0949

## 2024-09-17 ENCOUNTER — INFUSION (OUTPATIENT)
Dept: INFUSION THERAPY | Facility: HOSPITAL | Age: 24
End: 2024-09-17
Attending: OBSTETRICS & GYNECOLOGY
Payer: MEDICAID

## 2024-09-17 ENCOUNTER — HOSPITAL ENCOUNTER (INPATIENT)
Facility: HOSPITAL | Age: 24
LOS: 2 days | Discharge: HOME | End: 2024-09-20
Attending: OBSTETRICS & GYNECOLOGY | Admitting: OBSTETRICS & GYNECOLOGY
Payer: MEDICAID

## 2024-09-17 VITALS
DIASTOLIC BLOOD PRESSURE: 75 MMHG | OXYGEN SATURATION: 99 % | RESPIRATION RATE: 17 BRPM | HEART RATE: 75 BPM | SYSTOLIC BLOOD PRESSURE: 128 MMHG

## 2024-09-17 DIAGNOSIS — Z34.90 ENCOUNTER FOR ELECTIVE INDUCTION OF LABOR: ICD-10-CM

## 2024-09-17 DIAGNOSIS — Z3A.38 38 WEEKS GESTATION OF PREGNANCY: ICD-10-CM

## 2024-09-17 DIAGNOSIS — D50.8 OTHER IRON DEFICIENCY ANEMIA: Primary | ICD-10-CM

## 2024-09-17 DIAGNOSIS — Z37.9 VACUUM-ASSISTED VAGINAL DELIVERY: Primary | ICD-10-CM

## 2024-09-17 PROCEDURE — 25000003 PHARM REV CODE 250: Performed by: ADVANCED PRACTICE MIDWIFE

## 2024-09-17 PROCEDURE — 63600175 PHARM REV CODE 636 W HCPCS: Mod: UD | Performed by: ADVANCED PRACTICE MIDWIFE

## 2024-09-17 PROCEDURE — 3E0DXGC INTRODUCTION OF OTHER THERAPEUTIC SUBSTANCE INTO MOUTH AND PHARYNX, EXTERNAL APPROACH: ICD-10-PCS | Performed by: ADVANCED PRACTICE MIDWIFE

## 2024-09-17 PROCEDURE — 96365 THER/PROPH/DIAG IV INF INIT: CPT

## 2024-09-17 RX ORDER — DIPHENHYDRAMINE HYDROCHLORIDE 50 MG/ML
50 INJECTION INTRAMUSCULAR; INTRAVENOUS ONCE AS NEEDED
OUTPATIENT
Start: 2024-09-24

## 2024-09-17 RX ORDER — SODIUM CHLORIDE 0.9 % (FLUSH) 0.9 %
10 SYRINGE (ML) INJECTION
OUTPATIENT
Start: 2024-09-24

## 2024-09-17 RX ORDER — HEPARIN 100 UNIT/ML
500 SYRINGE INTRAVENOUS
OUTPATIENT
Start: 2024-09-24

## 2024-09-17 RX ORDER — EPINEPHRINE 0.3 MG/.3ML
0.3 INJECTION SUBCUTANEOUS ONCE AS NEEDED
OUTPATIENT
Start: 2024-09-24

## 2024-09-17 RX ORDER — SODIUM CHLORIDE 0.9 % (FLUSH) 0.9 %
10 SYRINGE (ML) INJECTION
Status: DISCONTINUED | OUTPATIENT
Start: 2024-09-17 | End: 2024-09-17 | Stop reason: HOSPADM

## 2024-09-17 RX ADMIN — SODIUM CHLORIDE 125 MG: 9 INJECTION, SOLUTION INTRAVENOUS at 12:09

## 2024-09-17 NOTE — PROGRESS NOTES
Patient arrived for ferrlecit infusion today ambulatory to bay. Therapy plan released and pharmacy notified.   Patients int started to left AC  1230 infusion started  1330 infusion finished  1330 Int flushed   1345 Patient discharged , given AVS and told to watch for signs and symptoms of adverse reactions , if had any to go to the ER. Patient given upcoming appointment

## 2024-09-18 ENCOUNTER — ANESTHESIA EVENT (OUTPATIENT)
Dept: OBSTETRICS AND GYNECOLOGY | Facility: HOSPITAL | Age: 24
End: 2024-09-18
Payer: MEDICAID

## 2024-09-18 ENCOUNTER — ANESTHESIA (OUTPATIENT)
Dept: OBSTETRICS AND GYNECOLOGY | Facility: HOSPITAL | Age: 24
End: 2024-09-18
Payer: MEDICAID

## 2024-09-18 PROBLEM — Z37.9 VACUUM-ASSISTED VAGINAL DELIVERY: Status: ACTIVE | Noted: 2024-09-18

## 2024-09-18 PROBLEM — Z34.90 ENCOUNTER FOR ELECTIVE INDUCTION OF LABOR: Status: ACTIVE | Noted: 2024-09-18

## 2024-09-18 LAB
ALBUMIN SERPL BCP-MCNC: 2.3 G/DL (ref 3.5–5)
ALBUMIN/GLOB SERPL: 0.6 {RATIO}
ALP SERPL-CCNC: 129 U/L (ref 37–98)
ALT SERPL W P-5'-P-CCNC: 23 U/L (ref 13–56)
ANION GAP SERPL CALCULATED.3IONS-SCNC: 12 MMOL/L (ref 7–16)
ANISOCYTOSIS BLD QL SMEAR: ABNORMAL
APTT PPP: 31.1 SECONDS (ref 25.2–37.3)
AST SERPL W P-5'-P-CCNC: 29 U/L (ref 15–37)
BACTERIA #/AREA URNS HPF: ABNORMAL /HPF
BASOPHILS # BLD AUTO: 0.02 K/UL (ref 0–0.2)
BASOPHILS NFR BLD AUTO: 0.3 % (ref 0–1)
BILIRUB SERPL-MCNC: 0.3 MG/DL (ref ?–1.2)
BILIRUB UR QL STRIP: NEGATIVE
BUN SERPL-MCNC: 9 MG/DL (ref 7–18)
BUN/CREAT SERPL: 17 (ref 6–20)
CALCIUM SERPL-MCNC: 8.2 MG/DL (ref 8.5–10.1)
CHLORIDE SERPL-SCNC: 110 MMOL/L (ref 98–107)
CLARITY UR: ABNORMAL
CO2 SERPL-SCNC: 23 MMOL/L (ref 21–32)
COLOR UR: YELLOW
CREAT SERPL-MCNC: 0.54 MG/DL (ref 0.55–1.02)
CRENATED CELLS: ABNORMAL
DIFFERENTIAL METHOD BLD: ABNORMAL
EGFR (NO RACE VARIABLE) (RUSH/TITUS): 131 ML/MIN/1.73M2
EOSINOPHIL # BLD AUTO: 0.05 K/UL (ref 0–0.5)
EOSINOPHIL NFR BLD AUTO: 0.7 % (ref 1–4)
ERYTHROCYTE [DISTWIDTH] IN BLOOD BY AUTOMATED COUNT: 18.4 % (ref 11.5–14.5)
FSP TITR PPP LA: 473 MG/DL (ref 283–506)
GLOBULIN SER-MCNC: 3.6 G/DL (ref 2–4)
GLUCOSE SERPL-MCNC: 72 MG/DL (ref 74–106)
GLUCOSE UR STRIP-MCNC: NORMAL MG/DL
HBV SURFACE AG SERPL QL IA: NORMAL
HCT VFR BLD AUTO: 29.4 % (ref 38–47)
HGB BLD-MCNC: 8.7 G/DL (ref 12–16)
HIV 1+O+2 AB SERPL QL: NORMAL
IMM GRANULOCYTES # BLD AUTO: 0.06 K/UL (ref 0–0.04)
IMM GRANULOCYTES NFR BLD: 0.8 % (ref 0–0.4)
INDIRECT COOMBS: NORMAL
INR BLD: 0.98
KETONES UR STRIP-SCNC: ABNORMAL MG/DL
LEUKOCYTE ESTERASE UR QL STRIP: ABNORMAL
LYMPHOCYTES # BLD AUTO: 1.74 K/UL (ref 1–4.8)
LYMPHOCYTES NFR BLD AUTO: 23.5 % (ref 27–41)
LYMPHOCYTES NFR BLD MANUAL: 22 % (ref 27–41)
MCH RBC QN AUTO: 23.4 PG (ref 27–31)
MCHC RBC AUTO-ENTMCNC: 29.6 G/DL (ref 32–36)
MCV RBC AUTO: 79 FL (ref 80–96)
MICROCYTES BLD QL SMEAR: ABNORMAL
MONOCYTES # BLD AUTO: 0.63 K/UL (ref 0–0.8)
MONOCYTES NFR BLD AUTO: 8.5 % (ref 2–6)
MONOCYTES NFR BLD MANUAL: 7 % (ref 2–6)
MPC BLD CALC-MCNC: ABNORMAL G/DL
MUCOUS, UA: ABNORMAL /LPF
NEUTROPHILS # BLD AUTO: 4.91 K/UL (ref 1.8–7.7)
NEUTROPHILS NFR BLD AUTO: 66.2 % (ref 53–65)
NEUTS SEG NFR BLD MANUAL: 71 % (ref 50–62)
NITRITE UR QL STRIP: NEGATIVE
NRBC # BLD AUTO: 0.09 X10E3/UL
NRBC BLD MANUAL-RTO: 2 /100 WBC
NRBC, AUTO (.00): 1.2 %
OVALOCYTES BLD QL SMEAR: ABNORMAL
PH UR STRIP: 6.5 PH UNITS
PLATELET # BLD AUTO: 137 K/UL (ref 150–400)
PLATELET MORPHOLOGY: ABNORMAL
POLYCHROMASIA BLD QL SMEAR: ABNORMAL
POTASSIUM SERPL-SCNC: 3.4 MMOL/L (ref 3.5–5.1)
PROT SERPL-MCNC: 5.9 G/DL (ref 6.4–8.2)
PROT UR QL STRIP: 50
PROTHROMBIN TIME: 12.9 SECONDS (ref 11.7–14.7)
RBC # BLD AUTO: 3.72 M/UL (ref 4.2–5.4)
RBC # UR STRIP: NEGATIVE /UL
RBC #/AREA URNS HPF: 2 /HPF
RH BLD: NORMAL
RUBV IGG SER-ACNC: 25 IU/ML
SODIUM SERPL-SCNC: 142 MMOL/L (ref 136–145)
SP GR UR STRIP: 1.03
SPECIMEN OUTDATE: NORMAL
SQUAMOUS #/AREA URNS LPF: ABNORMAL /HPF
SYPHILIS AB INTERPRETATION: NORMAL
UROBILINOGEN UR STRIP-ACNC: 3 MG/DL
WBC # BLD AUTO: 7.41 K/UL (ref 4.5–11)
WBC #/AREA URNS HPF: 16 /HPF

## 2024-09-18 PROCEDURE — 25000003 PHARM REV CODE 250: Performed by: ANESTHESIOLOGY

## 2024-09-18 PROCEDURE — 63600175 PHARM REV CODE 636 W HCPCS: Performed by: OBSTETRICS & GYNECOLOGY

## 2024-09-18 PROCEDURE — 72100002 HC LABOR CARE, 1ST 8 HOURS

## 2024-09-18 PROCEDURE — 72200005 HC VAGINAL DELIVERY LEVEL II

## 2024-09-18 PROCEDURE — 86762 RUBELLA ANTIBODY: CPT | Performed by: ADVANCED PRACTICE MIDWIFE

## 2024-09-18 PROCEDURE — 63600175 PHARM REV CODE 636 W HCPCS: Performed by: ADVANCED PRACTICE MIDWIFE

## 2024-09-18 PROCEDURE — 87340 HEPATITIS B SURFACE AG IA: CPT | Performed by: ADVANCED PRACTICE MIDWIFE

## 2024-09-18 PROCEDURE — 36415 COLL VENOUS BLD VENIPUNCTURE: CPT | Performed by: ADVANCED PRACTICE MIDWIFE

## 2024-09-18 PROCEDURE — 86900 BLOOD TYPING SEROLOGIC ABO: CPT | Performed by: ADVANCED PRACTICE MIDWIFE

## 2024-09-18 PROCEDURE — 59410 OBSTETRICAL CARE: CPT | Mod: TH,,, | Performed by: ADVANCED PRACTICE MIDWIFE

## 2024-09-18 PROCEDURE — 85025 COMPLETE CBC W/AUTO DIFF WBC: CPT | Performed by: ADVANCED PRACTICE MIDWIFE

## 2024-09-18 PROCEDURE — 63600175 PHARM REV CODE 636 W HCPCS: Performed by: ANESTHESIOLOGY

## 2024-09-18 PROCEDURE — 86780 TREPONEMA PALLIDUM: CPT | Performed by: ADVANCED PRACTICE MIDWIFE

## 2024-09-18 PROCEDURE — 87389 HIV-1 AG W/HIV-1&-2 AB AG IA: CPT | Performed by: ADVANCED PRACTICE MIDWIFE

## 2024-09-18 PROCEDURE — 11000001 HC ACUTE MED/SURG PRIVATE ROOM

## 2024-09-18 PROCEDURE — 86850 RBC ANTIBODY SCREEN: CPT | Performed by: ADVANCED PRACTICE MIDWIFE

## 2024-09-18 PROCEDURE — 80053 COMPREHEN METABOLIC PANEL: CPT | Performed by: ADVANCED PRACTICE MIDWIFE

## 2024-09-18 PROCEDURE — 25000003 PHARM REV CODE 250: Performed by: ADVANCED PRACTICE MIDWIFE

## 2024-09-18 PROCEDURE — 27000980 HC MATTRESS, OVERLAY WAFFLE

## 2024-09-18 PROCEDURE — 87086 URINE CULTURE/COLONY COUNT: CPT | Performed by: ADVANCED PRACTICE MIDWIFE

## 2024-09-18 PROCEDURE — 51702 INSERT TEMP BLADDER CATH: CPT

## 2024-09-18 PROCEDURE — C1751 CATH, INF, PER/CENT/MIDLINE: HCPCS | Performed by: ANESTHESIOLOGY

## 2024-09-18 PROCEDURE — 85730 THROMBOPLASTIN TIME PARTIAL: CPT | Performed by: ADVANCED PRACTICE MIDWIFE

## 2024-09-18 PROCEDURE — 62326 NJX INTERLAMINAR LMBR/SAC: CPT | Mod: AA | Performed by: ANESTHESIOLOGY

## 2024-09-18 PROCEDURE — 85610 PROTHROMBIN TIME: CPT | Performed by: ADVANCED PRACTICE MIDWIFE

## 2024-09-18 PROCEDURE — 81001 URINALYSIS AUTO W/SCOPE: CPT | Performed by: ADVANCED PRACTICE MIDWIFE

## 2024-09-18 PROCEDURE — 81003 URINALYSIS AUTO W/O SCOPE: CPT | Performed by: ADVANCED PRACTICE MIDWIFE

## 2024-09-18 PROCEDURE — 85384 FIBRINOGEN ACTIVITY: CPT | Performed by: ADVANCED PRACTICE MIDWIFE

## 2024-09-18 RX ORDER — SODIUM CHLORIDE 9 MG/ML
INJECTION, SOLUTION INTRAVENOUS
Status: DISCONTINUED | OUTPATIENT
Start: 2024-09-18 | End: 2024-09-19

## 2024-09-18 RX ORDER — SODIUM CHLORIDE 0.9 % (FLUSH) 0.9 %
3 SYRINGE (ML) INJECTION EVERY 8 HOURS PRN
Status: DISCONTINUED | OUTPATIENT
Start: 2024-09-18 | End: 2024-09-19

## 2024-09-18 RX ORDER — DIPHENHYDRAMINE HYDROCHLORIDE 50 MG/ML
25 INJECTION INTRAMUSCULAR; INTRAVENOUS EVERY 4 HOURS PRN
Status: DISCONTINUED | OUTPATIENT
Start: 2024-09-18 | End: 2024-09-20 | Stop reason: HOSPADM

## 2024-09-18 RX ORDER — BUTORPHANOL TARTRATE 2 MG/ML
2 INJECTION INTRAMUSCULAR; INTRAVENOUS
Status: DISCONTINUED | OUTPATIENT
Start: 2024-09-18 | End: 2024-09-20 | Stop reason: HOSPADM

## 2024-09-18 RX ORDER — LIDOCAINE HYDROCHLORIDE 20 MG/ML
200 INJECTION, SOLUTION EPIDURAL; INFILTRATION; INTRACAUDAL; PERINEURAL EVERY 5 MIN PRN
Status: DISCONTINUED | OUTPATIENT
Start: 2024-09-18 | End: 2024-09-19

## 2024-09-18 RX ORDER — DIPHENOXYLATE HYDROCHLORIDE AND ATROPINE SULFATE 2.5; .025 MG/1; MG/1
2 TABLET ORAL EVERY 6 HOURS PRN
Status: DISCONTINUED | OUTPATIENT
Start: 2024-09-18 | End: 2024-09-20 | Stop reason: HOSPADM

## 2024-09-18 RX ORDER — OXYTOCIN 10 [USP'U]/ML
10 INJECTION, SOLUTION INTRAMUSCULAR; INTRAVENOUS ONCE AS NEEDED
Status: DISCONTINUED | OUTPATIENT
Start: 2024-09-18 | End: 2024-09-18

## 2024-09-18 RX ORDER — MISOPROSTOL 200 UG/1
800 TABLET ORAL ONCE AS NEEDED
Status: DISCONTINUED | OUTPATIENT
Start: 2024-09-18 | End: 2024-09-20 | Stop reason: HOSPADM

## 2024-09-18 RX ORDER — FENTANYL/ROPIVACAINE/NS/PF 2MCG/ML-.2
PLASTIC BAG, INJECTION (ML) INJECTION CONTINUOUS
Status: DISCONTINUED | OUTPATIENT
Start: 2024-09-18 | End: 2024-09-18

## 2024-09-18 RX ORDER — OXYTOCIN-SODIUM CHLORIDE 0.9% IV SOLN 30 UNIT/500ML 30-0.9/5 UT/ML-%
95 SOLUTION INTRAVENOUS ONCE AS NEEDED
Status: DISCONTINUED | OUTPATIENT
Start: 2024-09-18 | End: 2024-09-19

## 2024-09-18 RX ORDER — METHYLERGONOVINE MALEATE 0.2 MG/ML
200 INJECTION INTRAVENOUS ONCE AS NEEDED
Status: DISCONTINUED | OUTPATIENT
Start: 2024-09-18 | End: 2024-09-19

## 2024-09-18 RX ORDER — SODIUM CHLORIDE, SODIUM LACTATE, POTASSIUM CHLORIDE, CALCIUM CHLORIDE 600; 310; 30; 20 MG/100ML; MG/100ML; MG/100ML; MG/100ML
INJECTION, SOLUTION INTRAVENOUS CONTINUOUS
Status: DISCONTINUED | OUTPATIENT
Start: 2024-09-18 | End: 2024-09-20 | Stop reason: HOSPADM

## 2024-09-18 RX ORDER — SIMETHICONE 80 MG
1 TABLET,CHEWABLE ORAL 4 TIMES DAILY PRN
Status: DISCONTINUED | OUTPATIENT
Start: 2024-09-18 | End: 2024-09-18

## 2024-09-18 RX ORDER — ACETAMINOPHEN 325 MG/1
650 TABLET ORAL EVERY 6 HOURS SCHEDULED
Status: DISCONTINUED | OUTPATIENT
Start: 2024-09-18 | End: 2024-09-18

## 2024-09-18 RX ORDER — DIPHENHYDRAMINE HCL 25 MG
25 CAPSULE ORAL EVERY 4 HOURS PRN
Status: DISCONTINUED | OUTPATIENT
Start: 2024-09-18 | End: 2024-09-20 | Stop reason: HOSPADM

## 2024-09-18 RX ORDER — ACETAMINOPHEN AND CODEINE PHOSPHATE 300; 30 MG/1; MG/1
1 TABLET ORAL EVERY 4 HOURS PRN
Status: DISCONTINUED | OUTPATIENT
Start: 2024-09-18 | End: 2024-09-20 | Stop reason: HOSPADM

## 2024-09-18 RX ORDER — BUTORPHANOL TARTRATE 2 MG/ML
1 INJECTION INTRAMUSCULAR; INTRAVENOUS
Status: DISCONTINUED | OUTPATIENT
Start: 2024-09-18 | End: 2024-09-19

## 2024-09-18 RX ORDER — ACETAMINOPHEN AND CODEINE PHOSPHATE 300; 30 MG/1; MG/1
2 TABLET ORAL EVERY 4 HOURS PRN
Status: DISCONTINUED | OUTPATIENT
Start: 2024-09-18 | End: 2024-09-20 | Stop reason: HOSPADM

## 2024-09-18 RX ORDER — OXYTOCIN-SODIUM CHLORIDE 0.9% IV SOLN 30 UNIT/500ML 30-0.9/5 UT/ML-%
20 SOLUTION INTRAVENOUS ONCE AS NEEDED
Status: DISCONTINUED | OUTPATIENT
Start: 2024-09-18 | End: 2024-09-19

## 2024-09-18 RX ORDER — LIDOCAINE HYDROCHLORIDE 10 MG/ML
10 INJECTION, SOLUTION INFILTRATION; PERINEURAL ONCE AS NEEDED
Status: DISCONTINUED | OUTPATIENT
Start: 2024-09-18 | End: 2024-09-19

## 2024-09-18 RX ORDER — OXYTOCIN-SODIUM CHLORIDE 0.9% IV SOLN 30 UNIT/500ML 30-0.9/5 UT/ML-%
10 SOLUTION INTRAVENOUS ONCE AS NEEDED
Status: DISCONTINUED | OUTPATIENT
Start: 2024-09-18 | End: 2024-09-19

## 2024-09-18 RX ORDER — SIMETHICONE 80 MG
1 TABLET,CHEWABLE ORAL EVERY 6 HOURS PRN
Status: DISCONTINUED | OUTPATIENT
Start: 2024-09-18 | End: 2024-09-20 | Stop reason: HOSPADM

## 2024-09-18 RX ORDER — LIDOCAINE HYDROCHLORIDE 20 MG/ML
INJECTION, SOLUTION EPIDURAL; INFILTRATION; INTRACAUDAL; PERINEURAL
Status: COMPLETED | OUTPATIENT
Start: 2024-09-18 | End: 2024-09-18

## 2024-09-18 RX ORDER — OXYTOCIN-SODIUM CHLORIDE 0.9% IV SOLN 30 UNIT/500ML 30-0.9/5 UT/ML-%
95 SOLUTION INTRAVENOUS CONTINUOUS PRN
Status: DISCONTINUED | OUTPATIENT
Start: 2024-09-18 | End: 2024-09-19

## 2024-09-18 RX ORDER — ACETAMINOPHEN 325 MG/1
650 TABLET ORAL EVERY 6 HOURS
Status: CANCELLED | OUTPATIENT
Start: 2024-09-18 | End: 2024-09-19

## 2024-09-18 RX ORDER — ONDANSETRON HYDROCHLORIDE 2 MG/ML
4 INJECTION, SOLUTION INTRAVENOUS EVERY 6 HOURS PRN
Status: CANCELLED | OUTPATIENT
Start: 2024-09-18 | End: 2024-09-19

## 2024-09-18 RX ORDER — ONDANSETRON 4 MG/1
8 TABLET, ORALLY DISINTEGRATING ORAL EVERY 8 HOURS PRN
Status: DISCONTINUED | OUTPATIENT
Start: 2024-09-18 | End: 2024-09-18

## 2024-09-18 RX ORDER — CALCIUM CARBONATE 200(500)MG
500 TABLET,CHEWABLE ORAL 3 TIMES DAILY PRN
Status: DISCONTINUED | OUTPATIENT
Start: 2024-09-18 | End: 2024-09-19

## 2024-09-18 RX ORDER — KETOROLAC TROMETHAMINE 30 MG/ML
30 INJECTION, SOLUTION INTRAMUSCULAR; INTRAVENOUS EVERY 6 HOURS
Status: CANCELLED | OUTPATIENT
Start: 2024-09-18 | End: 2024-09-19

## 2024-09-18 RX ORDER — OXYTOCIN-SODIUM CHLORIDE 0.9% IV SOLN 30 UNIT/500ML 30-0.9/5 UT/ML-%
95 SOLUTION INTRAVENOUS CONTINUOUS PRN
Status: DISCONTINUED | OUTPATIENT
Start: 2024-09-18 | End: 2024-09-20 | Stop reason: HOSPADM

## 2024-09-18 RX ORDER — HYDROCORTISONE 25 MG/G
CREAM TOPICAL 3 TIMES DAILY PRN
Status: DISCONTINUED | OUTPATIENT
Start: 2024-09-18 | End: 2024-09-20 | Stop reason: HOSPADM

## 2024-09-18 RX ORDER — TRANEXAMIC ACID 10 MG/ML
1000 INJECTION, SOLUTION INTRAVENOUS EVERY 30 MIN PRN
Status: DISCONTINUED | OUTPATIENT
Start: 2024-09-18 | End: 2024-09-20 | Stop reason: HOSPADM

## 2024-09-18 RX ORDER — MISOPROSTOL 200 UG/1
800 TABLET ORAL ONCE AS NEEDED
Status: COMPLETED | OUTPATIENT
Start: 2024-09-18 | End: 2024-09-18

## 2024-09-18 RX ORDER — ONDANSETRON HYDROCHLORIDE 2 MG/ML
4 INJECTION, SOLUTION INTRAVENOUS EVERY 6 HOURS PRN
Status: DISCONTINUED | OUTPATIENT
Start: 2024-09-18 | End: 2024-09-20 | Stop reason: HOSPADM

## 2024-09-18 RX ORDER — EPHEDRINE SULFATE 50 MG/ML
10 INJECTION, SOLUTION INTRAVENOUS ONCE
Status: DISCONTINUED | OUTPATIENT
Start: 2024-09-18 | End: 2024-09-19

## 2024-09-18 RX ORDER — TERBUTALINE SULFATE 1 MG/ML
0.25 INJECTION SUBCUTANEOUS
Status: DISCONTINUED | OUTPATIENT
Start: 2024-09-18 | End: 2024-09-19

## 2024-09-18 RX ORDER — IBUPROFEN 800 MG/1
800 TABLET ORAL EVERY 8 HOURS PRN
Status: DISCONTINUED | OUTPATIENT
Start: 2024-09-18 | End: 2024-09-20 | Stop reason: HOSPADM

## 2024-09-18 RX ORDER — CARBOPROST TROMETHAMINE 250 UG/ML
250 INJECTION, SOLUTION INTRAMUSCULAR
Status: DISCONTINUED | OUTPATIENT
Start: 2024-09-18 | End: 2024-09-19

## 2024-09-18 RX ORDER — OXYCODONE HYDROCHLORIDE 5 MG/1
5 TABLET ORAL EVERY 4 HOURS PRN
Status: CANCELLED | OUTPATIENT
Start: 2024-09-18 | End: 2024-09-19

## 2024-09-18 RX ORDER — DOCUSATE SODIUM 100 MG/1
200 CAPSULE, LIQUID FILLED ORAL 2 TIMES DAILY PRN
Status: DISCONTINUED | OUTPATIENT
Start: 2024-09-18 | End: 2024-09-20 | Stop reason: HOSPADM

## 2024-09-18 RX ORDER — LANOLIN ALCOHOL/MO/W.PET/CERES
1 CREAM (GRAM) TOPICAL 2 TIMES DAILY
Status: DISCONTINUED | OUTPATIENT
Start: 2024-09-18 | End: 2024-09-20 | Stop reason: HOSPADM

## 2024-09-18 RX ORDER — METHYLERGONOVINE MALEATE 0.2 MG/ML
200 INJECTION INTRAVENOUS ONCE AS NEEDED
Status: DISCONTINUED | OUTPATIENT
Start: 2024-09-18 | End: 2024-09-20 | Stop reason: HOSPADM

## 2024-09-18 RX ORDER — OXYTOCIN 10 [USP'U]/ML
10 INJECTION, SOLUTION INTRAMUSCULAR; INTRAVENOUS ONCE AS NEEDED
Status: DISCONTINUED | OUTPATIENT
Start: 2024-09-18 | End: 2024-09-20 | Stop reason: HOSPADM

## 2024-09-18 RX ORDER — MISOPROSTOL 100 MCG
50 TABLET ORAL EVERY 6 HOURS
Status: DISCONTINUED | OUTPATIENT
Start: 2024-09-18 | End: 2024-09-18

## 2024-09-18 RX ORDER — OXYCODONE HYDROCHLORIDE 5 MG/1
10 TABLET ORAL EVERY 4 HOURS PRN
Status: CANCELLED | OUTPATIENT
Start: 2024-09-18 | End: 2024-09-19

## 2024-09-18 RX ORDER — ONDANSETRON 4 MG/1
8 TABLET, ORALLY DISINTEGRATING ORAL EVERY 8 HOURS PRN
Status: DISCONTINUED | OUTPATIENT
Start: 2024-09-18 | End: 2024-09-20 | Stop reason: HOSPADM

## 2024-09-18 RX ORDER — DIPHENOXYLATE HYDROCHLORIDE AND ATROPINE SULFATE 2.5; .025 MG/1; MG/1
2 TABLET ORAL EVERY 6 HOURS PRN
Status: DISCONTINUED | OUTPATIENT
Start: 2024-09-18 | End: 2024-09-18

## 2024-09-18 RX ORDER — CARBOPROST TROMETHAMINE 250 UG/ML
250 INJECTION, SOLUTION INTRAMUSCULAR
Status: DISCONTINUED | OUTPATIENT
Start: 2024-09-18 | End: 2024-09-20 | Stop reason: HOSPADM

## 2024-09-18 RX ORDER — MISOPROSTOL 200 UG/1
800 TABLET ORAL ONCE AS NEEDED
Status: DISCONTINUED | OUTPATIENT
Start: 2024-09-18 | End: 2024-09-19

## 2024-09-18 RX ORDER — ACETAMINOPHEN 325 MG/1
650 TABLET ORAL EVERY 6 HOURS PRN
Status: DISCONTINUED | OUTPATIENT
Start: 2024-09-18 | End: 2024-09-19

## 2024-09-18 RX ORDER — PRENATAL WITH FERROUS FUM AND FOLIC ACID 3080; 920; 120; 400; 22; 1.84; 3; 20; 10; 1; 12; 200; 27; 25; 2 [IU]/1; [IU]/1; MG/1; [IU]/1; MG/1; MG/1; MG/1; MG/1; MG/1; MG/1; UG/1; MG/1; MG/1; MG/1; MG/1
1 TABLET ORAL DAILY
Status: DISCONTINUED | OUTPATIENT
Start: 2024-09-18 | End: 2024-09-20 | Stop reason: HOSPADM

## 2024-09-18 RX ADMIN — MISOPROSTOL 50 MCG: 100 TABLET ORAL at 12:09

## 2024-09-18 RX ADMIN — MISOPROSTOL 800 MCG: 200 TABLET ORAL at 06:09

## 2024-09-18 RX ADMIN — Medication 1 TABLET: at 09:09

## 2024-09-18 RX ADMIN — FERROUS SULFATE TAB 325 MG (65 MG ELEMENTAL FE) 1 EACH: 325 (65 FE) TAB at 09:09

## 2024-09-18 RX ADMIN — SODIUM CHLORIDE: 9 INJECTION, SOLUTION INTRAVENOUS at 06:09

## 2024-09-18 RX ADMIN — TRANEXAMIC ACID 1000 MG: 10 INJECTION, SOLUTION INTRAVENOUS at 06:09

## 2024-09-18 RX ADMIN — ROPIVACAINE HYDROCHLORIDE 500 MCG: 10 INJECTION, SOLUTION EPIDURAL at 03:09

## 2024-09-18 RX ADMIN — LIDOCAINE HYDROCHLORIDE 200 MG: 20 INJECTION, SOLUTION EPIDURAL; INFILTRATION; INTRACAUDAL; PERINEURAL at 03:09

## 2024-09-18 RX ADMIN — IBUPROFEN 800 MG: 800 TABLET, FILM COATED ORAL at 07:09

## 2024-09-18 RX ADMIN — CARBOPROST TROMETHAMINE 250 MCG: 250 INJECTION, SOLUTION INTRAMUSCULAR at 06:09

## 2024-09-18 RX ADMIN — LIDOCAINE HYDROCHLORIDE 10 ML: 20 INJECTION, SOLUTION INTRAVENOUS at 04:09

## 2024-09-18 RX ADMIN — ACETAMINOPHEN AND CODEINE PHOSPHATE 2 TABLET: 300; 30 TABLET ORAL at 09:09

## 2024-09-18 RX ADMIN — SODIUM CHLORIDE, POTASSIUM CHLORIDE, SODIUM LACTATE AND CALCIUM CHLORIDE: 600; 310; 30; 20 INJECTION, SOLUTION INTRAVENOUS at 01:09

## 2024-09-18 RX ADMIN — BUTORPHANOL TARTRATE 2 MG: 2 INJECTION, SOLUTION INTRAMUSCULAR; INTRAVENOUS at 01:09

## 2024-09-18 RX ADMIN — TERBUTALINE SULFATE 0.25 MG: 1 INJECTION SUBCUTANEOUS at 06:09

## 2024-09-18 NOTE — ANESTHESIA PROCEDURE NOTES
Epidural    Patient location during procedure: OB   Reason for block: primary anesthetic   Reason for block: labor analgesia requested by patient and obstetrician  Diagnosis: IUP   Start time: 9/18/2024 3:52 AM  Timeout: 9/18/2024 3:51 AM  End time: 9/18/2024 4:01 AM    Staffing  Performing Provider: Sergey Avina MD  Authorizing Provider: Sergey Avina MD    Staffing  Performed by: Sergey Avina MD  Authorized by: Sergey Avina MD        Preanesthetic Checklist  Completed: patient identified, pre-op evaluation, timeout performed, anesthesia consent given, hand hygiene performed and patient being monitored  Preparation  Patient position: sitting  Prep: Betadine  Reason for block: primary anesthetic   Epidural  Skin Anesthetic: lidocaine 1%  Administration type: single shot  Approach: midline  Interspace: L4-5    Injection technique: MILDRED air  Needle and Epidural Catheter  Insertion Attempts: 1  Test dose: 3 mL of lidocaine 1.5% with Epi 1-to-200,000  Additional Documentation: negative aspiration for heme and CSF  Needle localization: anatomical landmarks  Assessment  Ease of block: easy  Additional Notes  Informed consent. Aseptic technique.   L4/5 epidural catheter. Standard PCEA.   No complications.         Medications:    Medications: LIDOcaine (PF) 20 mg/mL (2%) injection - Epidural   10 mL - 9/18/2024 4:00:00 AM

## 2024-09-18 NOTE — ANESTHESIA POSTPROCEDURE EVALUATION
Anesthesia Post Evaluation    Patient: Nida Gold    Procedure(s) Performed: * No procedures listed *    Final Anesthesia Type: epidural      Patient location during evaluation: labor & delivery  Post-procedure vital signs: reviewed and stable  Pain management: adequate  Airway patency: patent  CRISTAL mitigation strategies: Use of major conduction anesthesia (spinal/epidural) or peripheral nerve block  PONV status at discharge: No PONV  Anesthetic complications: no      Cardiovascular status: hemodynamically stable  Respiratory status: unassisted  Hydration status: euvolemic  Follow-up not needed.              Vitals Value Taken Time   /91 09/18/24 0945   Temp 36.6 °C (97.8 °F) 09/18/24 0715   Pulse 84 09/18/24 0945   Resp 18 09/18/24 0953   SpO2 99 % 09/18/24 0715         No case tracking events are documented in the log.      Pain/Brody Score: Pain Rating Prior to Med Admin: 9 (9/18/2024  9:53 AM)  Pain Rating Post Med Admin: 0 (9/18/2024  2:20 AM)  Brody Score: 10 (9/18/2024  7:15 AM)

## 2024-09-18 NOTE — L&D DELIVERY NOTE
"[facility] -  Labor and Delivery  Vaginal Delivery   Operative Note    SUMMARY     Vacuum assisted vaginal delivery of live infant, was placed on mothers abdomen for skin to skin and bulb suctioning performed.  Infant delivered position JONATHAN  over an intact perineum with a small perineal abrasion noted, no repair needed .  Nuchal cord: Yes, cord reduced at perineum.    Spontaneous delivery of placenta and IV pitocin given noting uterine atony with bleeding.  Perineal abrasion noted, no repair needed .  Patient tolerated delivery well. Sponge needle and lap counted correctly x2.    Indications: Vacuum-assisted vaginal delivery  Pregnancy complicated by:   Patient Active Problem List   Diagnosis     labor without delivery, third trimester    Acute cystitis without hematuria    Pelvic pain in pregnancy, antepartum, third trimester    Labor, false ([place]), antepartum    36 weeks gestation of pregnancy    Iron deficiency anemia    38 weeks gestation of pregnancy    Maternal iron deficiency anemia affecting pregnancy in third trimester, antepartum    Obesity affecting pregnancy in third trimester    Vacuum-assisted vaginal delivery    Encounter for elective induction of labor    Third-stage postpartum hemorrhage     Admitting GA: 39w0d    Delivery Information for Salvador Gold    Birth information:  YOB: 2025   Time of birth: 6:24 AM   Sex: male   Head Delivery Date/Time: 2024  6:24 AM   Delivery type: Vaginal, Vacuum (Extractor)   Gestational Age: 39w0d        Delivery Providers    Delivering clinician: Chula Davey CNM           Measurements    Weight: 3145 g  Weight (lbs): 6 lb 14.9 oz  Length: 53.3 cm  Length (in): 21"         Apgars    Living status: Living  Apgar Component Scores:  1 min.:  5 min.:  10 min.:  15 min.:  20 min.:    Skin color:  0  1       Heart rate:  2  2       Reflex irritability:  2  2       Muscle tone:  2  2       Respiratory effort:  2  2  "      Total:  8  9                Operative Delivery    Forceps attempted?: No  Vacuum extractor attempted?: Yes  Vacuum indications: Fetal Heart Rate or Rhythm Abnormality  Vacuum type: Kiwi, flat  Vacuum application location: Low  First attempt time vacuum applied: 2024 06:23:50  First attempt time vacuum removed: 2024 06:24:30  Number of pop offs: 1  Number of pulls with vacuum: 1  Low end pressure range (mmHg): 500  High end pressure range (mmHg): 550  Total vacuum application time: 10 seconds  Vacuum applied by: AMBROCIO KRAFT         Shoulder Dystocia    Shoulder dystocia present?: No           Presentation    Presentation: Vertex  Position: Left Occiput Anterior           Interventions/Resuscitation    Method: Bulb Suctioning       Cord    Vessels: 3 vessels  Complications: Nuchal  Nuchal Intervention: reduced  Nuchal Cord Description: loose nuchal cord  Number of Loops: 1  Delayed Cord Clamping?: No  Cord Clamped Date/Time: 2024  6:25 AM  Cord Blood Disposition: Lab  Gases Sent?: No  Stem Cell Collection (by MD): No       Placenta    Placenta delivery date/time: 202427  Placenta removal: Spontaneous  Placenta appearance: Intact  Placenta disposition: Discarded           Labor Events:       labor: No     Labor Onset Date/Time:         Dilation Complete Date/Time:         Start Pushing Date/Time: 2024 05:52     Rupture Date/Time:            Rupture type:          Fluid Amount:       Fluid Color:        steroids:       Antibiotics given for GBS: No     Induction: misoprostol     Indications for induction:        Augmentation:       Indications for augmentation:       Labor complications: Fetal Intolerance     Additional complications:          Cervical ripening:                     Delivery:      Episiotomy: None     Indication for Episiotomy:       Perineal Lacerations: 1st Repaired:  No   Periurethral Laceration:   Repaired:     Labial Laceration:   Repaired:      Sulcus Laceration:   Repaired:     Vaginal Laceration:   Repaired:     Cervical Laceration:   Repaired:     Repair suture: None     Repair # of packets:       Last Value - EBL - Nursing (mL):       Sum - EBL - Nursing (mL): 0     Last Value - EBL - Anesthesia (mL):      Calculated QBL (mL): 353      Running total QBL (mL): 353      Vaginal Sweep Performed: Yes     Surgicount Correct: Yes       Other providers:       Anesthesia    Method: Epidural          Details (if applicable):  Trial of Labor      Categorization:      Priority:     Indications for :     Incision Type:       Additional  information:  Forceps:    Vacuum:    Breech:    Observed anomalies    Other (Comments): Patient was found to be completely effaced and completely dilated at a +2 station. Patient experienced fetal intolerance to labor- discussed a vacuum for delivery with risks and benefits discussed. Pt agreed. Vacuum applied to fetal cephalic presenting part. Patient subsequently delivered a male liveborn infant via vacuum assisted vaginal delivery. Baby's head was delivered in JONATHAN position. A Nuchal cord was noted and easily slipped over baby's head. Anterior and posterior shoulders were delivered with ease followed by the body and was delivered with ease. Mouth and nose bulb suctioned. The baby was placed onto maternal abdomen, umbilical cord was doubly clamped and cut. Cord blood was obtained and sent. The placenta was delivered spontaneously via Vince, noted intact with a three-vessel cord and normal cord insertion. Uterine atony noted- bimanual compression performed, bleeding unresolved with pitocin IV, cytotec 800 mcg rectally, hemabate 250 mcg subcutaneous, and TXA given via IV. The uterus was then found to be firm and well contracted. Uterine sweep performed with small amount of clots removed. Hemostasis was found to be adequate. The perineum was intact with a small first degree perineal abrasion.  All sponge lap and instrument counts were correct ×2. Baby and mother are in stable condition. Mother desires to bottle feed

## 2024-09-18 NOTE — PROGRESS NOTES
"S: Called to attend delivery. Pt indicates she has an epidural and "can feel these contractions". She indicates she was "leaking something yesterday because I had to pee a lot". She did not indicate any continuous leakage of fluid, decreased FM, or vaginal bleeding.    O: FHTs 100s with variable decelerations noted      Uterine contractions every 2-3 min/40-60 sec/mod      SVE 9 cm/90%/+1 station with an anterior lip noted      Cephalic presentation feels direct OP    A: hyperstimulation    P: Give brethine       FSE applied      O2 via face mask      Repositioned for comfort      Consulted Dr. Xavier      Anticipate vaginal delivery  "

## 2024-09-18 NOTE — ANESTHESIA PREPROCEDURE EVALUATION
2024  Nida Gold is a 24 y.o., female.      Pre-op Assessment    I have reviewed the Patient Summary Reports.       I have reviewed the Medications.     Review of Systems         Anesthesia Plan  Type of Anesthesia, risks & benefits discussed:    Anesthesia Type: Epidural  Informed Consent: Informed consent signed with the Patient and all parties understand the risks and agree with anesthesia plan.  All questions answered.   ASA Score: 2    Ready For Surgery From Anesthesia Perspective.     .  No known anesthetic complications  NKDA    Hct 29  Platelets 137  K 3.4  Ca 8.2     at 39 weeks  Anemia  Thrombocytopenia  Hypocalcemia    Adequate ROM at neck    Plan is labor epidural

## 2024-09-18 NOTE — HOSPITAL COURSE
Patient is a  at 39 0/7 wks gestation that presented for an elective induction of labor. Labor progressed for the patient to deliver via vacuum assisted vaginal delivery a male  with Apgars 8 and 9 weighing 6 pounds 14.9 ounces over an intact perineum with a small perineal abrasion. She is bottle feeding.

## 2024-09-18 NOTE — PLAN OF CARE
Problem: Adult Inpatient Plan of Care  Goal: Plan of Care Review  2024 by Joslyn Wheeler RN  Outcome: Progressing  2024 by Joslyn Wheeler RN  Outcome: Progressing  Goal: Patient-Specific Goal (Individualized)  2024 by Joslyn Wheeler RN  Outcome: Progressing  2024 by Joslyn Wheeler RN  Outcome: Progressing  Goal: Absence of Hospital-Acquired Illness or Injury  2024 by Joslyn Wheeler RN  Outcome: Progressing  2024 by Joslyn Wheeler RN  Outcome: Progressing  Goal: Optimal Comfort and Wellbeing  2024 by Joslyn Wheeler RN  Outcome: Progressing  2024 by Joslyn Wheeler RN  Outcome: Progressing  Goal: Readiness for Transition of Care  2024 by Joslyn Wheeler RN  Outcome: Progressing  2024 by Joslyn Wheeler RN  Outcome: Progressing     Problem:  Fall Injury Risk  Goal: Absence of Fall, Infant Drop and Related Injury  2024 by Joslyn Wheeler RN  Outcome: Progressing  2024 by Joslyn Wheeler RN  Outcome: Progressing     Problem: Infection  Goal: Absence of Infection Signs and Symptoms  2024 by Joslyn Wheeler RN  Outcome: Progressing  2024 by Joslyn Wheeler RN  Outcome: Progressing     Problem: Labor  Goal: Hemostasis  Outcome: Progressing  Goal: Stable Fetal Wellbeing  Outcome: Progressing  Goal: Effective Progression to Delivery  Outcome: Progressing  Goal: Absence of Infection Signs and Symptoms  Outcome: Progressing  Goal: Acceptable Pain Control  Outcome: Progressing  Goal: Normal Uterine Contraction Pattern  Outcome: Progressing     Problem: Postpartum (Vaginal Delivery)  Goal: Successful Parent Role Transition  Outcome: Progressing  Goal: Hemostasis  Outcome: Progressing  Goal: Absence of Infection Signs and Symptoms  Outcome: Progressing  Goal: Anesthesia/Sedation Recovery  Outcome: Progressing  Goal: Optimal Pain Control and  Function  Outcome: Progressing  Goal: Effective Urinary Elimination  Outcome: Progressing

## 2024-09-19 LAB
BASOPHILS # BLD AUTO: 0.03 K/UL (ref 0–0.2)
BASOPHILS NFR BLD AUTO: 0.3 % (ref 0–1)
CRENATED CELLS: ABNORMAL
DIFFERENTIAL METHOD BLD: ABNORMAL
EOSINOPHIL # BLD AUTO: 0.03 K/UL (ref 0–0.5)
EOSINOPHIL NFR BLD AUTO: 0.3 % (ref 1–4)
ERYTHROCYTE [DISTWIDTH] IN BLOOD BY AUTOMATED COUNT: 18.2 % (ref 11.5–14.5)
HCT VFR BLD AUTO: 25.1 % (ref 38–47)
HGB BLD-MCNC: 7.4 G/DL (ref 12–16)
IMM GRANULOCYTES # BLD AUTO: 0.06 K/UL (ref 0–0.04)
IMM GRANULOCYTES NFR BLD: 0.6 % (ref 0–0.4)
LYMPHOCYTES # BLD AUTO: 1.75 K/UL (ref 1–4.8)
LYMPHOCYTES NFR BLD AUTO: 17.5 % (ref 27–41)
MCH RBC QN AUTO: 23.1 PG (ref 27–31)
MCHC RBC AUTO-ENTMCNC: 29.5 G/DL (ref 32–36)
MCV RBC AUTO: 78.4 FL (ref 80–96)
MONOCYTES # BLD AUTO: 0.68 K/UL (ref 0–0.8)
MONOCYTES NFR BLD AUTO: 6.8 % (ref 2–6)
MPC BLD CALC-MCNC: ABNORMAL G/DL
NEUTROPHILS # BLD AUTO: 7.47 K/UL (ref 1.8–7.7)
NEUTROPHILS NFR BLD AUTO: 74.5 % (ref 53–65)
NRBC # BLD AUTO: 0.04 X10E3/UL
NRBC, AUTO (.00): 0.4 %
PLATELET # BLD AUTO: 118 K/UL (ref 150–400)
PLATELET MORPHOLOGY: ABNORMAL
RBC # BLD AUTO: 3.2 M/UL (ref 4.2–5.4)
WBC # BLD AUTO: 10.02 K/UL (ref 4.5–11)

## 2024-09-19 PROCEDURE — 25000003 PHARM REV CODE 250: Performed by: ADVANCED PRACTICE MIDWIFE

## 2024-09-19 PROCEDURE — 36415 COLL VENOUS BLD VENIPUNCTURE: CPT | Performed by: ADVANCED PRACTICE MIDWIFE

## 2024-09-19 PROCEDURE — 85025 COMPLETE CBC W/AUTO DIFF WBC: CPT | Performed by: ADVANCED PRACTICE MIDWIFE

## 2024-09-19 PROCEDURE — 63600175 PHARM REV CODE 636 W HCPCS: Performed by: ADVANCED PRACTICE MIDWIFE

## 2024-09-19 PROCEDURE — 11000001 HC ACUTE MED/SURG PRIVATE ROOM

## 2024-09-19 RX ADMIN — SODIUM CHLORIDE 125 MG: 9 INJECTION, SOLUTION INTRAVENOUS at 10:09

## 2024-09-19 RX ADMIN — ACETAMINOPHEN AND CODEINE PHOSPHATE 2 TABLET: 300; 30 TABLET ORAL at 09:09

## 2024-09-19 RX ADMIN — FERROUS SULFATE TAB 325 MG (65 MG ELEMENTAL FE) 1 EACH: 325 (65 FE) TAB at 09:09

## 2024-09-19 RX ADMIN — ACETAMINOPHEN AND CODEINE PHOSPHATE 2 TABLET: 300; 30 TABLET ORAL at 02:09

## 2024-09-19 RX ADMIN — FERROUS SULFATE TAB 325 MG (65 MG ELEMENTAL FE) 1 EACH: 325 (65 FE) TAB at 08:09

## 2024-09-19 RX ADMIN — IBUPROFEN 800 MG: 800 TABLET, FILM COATED ORAL at 07:09

## 2024-09-19 RX ADMIN — IBUPROFEN 800 MG: 800 TABLET, FILM COATED ORAL at 02:09

## 2024-09-19 NOTE — PROGRESS NOTES
[facility] -  Labor and Delivery  Obstetrics  Postpartum Progress Note    Patient Name: Nida Gold  MRN: [id number]  Admission Date: 2024  Hospital Length of Stay: 1 days  Attending Physician: Marcos Byrne MD  Primary Care Provider: Kita, Primary Doctor    Subjective:     Principal Problem:Vacuum-assisted vaginal delivery    Hospital Course:  Patient is a  at 39 0/7 wks gestation that presented for an elective induction of labor. Labor progressed for the patient to deliver via vacuum assisted vaginal delivery a male  with Apgars 8 and 9 weighing 6 pounds 14.9 ounces over an intact perineum with a small perineal abrasion. She is bottle feeding.     Interval History: PPD 1    She is doing well this morning. She is tolerating a regular diet without nausea or vomiting. She is voiding spontaneously. She is ambulating. She has passed flatus, and has a BM. Vaginal bleeding is mild. She denies fever or chills. Abdominal pain is mild and controlled with oral medications. She Is not breastfeeding. She desires circumcision for her male baby: yes.    Objective:     Vital Signs (Most Recent):  Temp: 97.1 °F (36.2 °C) (24)  Pulse: 64 (24)  Resp: 18 (24)  BP: 121/67 (24)  SpO2: 99 % (24) Vital Signs (24h Range):  Temp:  [97 °F (36.1 °C)-97.6 °F (36.4 °C)] 97.1 °F (36.2 °C)  Pulse:  [55-84] 64  Resp:  [17-18] 18  SpO2:  [99 %] 99 %  BP: ()/(55-91) 121/67     Weight: 100.9 kg (222 lb 8 oz)  Body mass index is 34.85 kg/m².      Intake/Output Summary (Last 24 hours) at 2024 0817  Last data filed at 2024 1110  Gross per 24 hour   Intake --   Output 200 ml   Net -200 ml         Significant Labs:  Lab Results   Component Value Date    GROUPTRH B POS 2024    HEPBSAG Non-Reactive 2024     Recent Labs   Lab 24  0358   HGB 7.4*   HCT 25.1*       CBC:   Recent Labs   Lab 24  0358   WBC 10.02   RBC 3.20*    HGB 7.4*   HCT 25.1*   *   MCV 78.4*   MCH 23.1*   MCHC 29.5*     I have personallly reviewed all pertinent lab results from the last 24 hours.  Recent Lab Results         24  0358        Baso # 0.03       Basophil % 0.3       Crenated RBC's Few       Differential Method Scan Smear       Eos # 0.03       Eos % 0.3       Hematocrit 25.1       Hemoglobin 7.4       Immature Grans (Abs) 0.06       Immature Granulocytes 0.6       Lymph # 1.75       Lymph % 17.5       MCH 23.1       MCHC 29.5       MCV 78.4       Mono # 0.68       Mono % 6.8       MPV       Neutrophils, Abs 7.47       Neutrophils Relative 74.5       nRBC 0.4       NUCLEATED RBC ABSOLUTE 0.04       PLATELET MORPHOLOGY Decreased       Platelet Count 118       RBC 3.20       RDW 18.2       WBC 10.02               Physical Exam:   Constitutional: She is oriented to person, place, and time. She appears well-developed and well-nourished.        Pulmonary/Chest: Effort normal.        Abdominal: Soft.     Genitourinary:    Genitourinary Comments: Uterus: firm, midline, 3 below umbilicus  Lochia: scant rubra noted  Perineum: intact, no edema                 Neurological: She is alert and oriented to person, place, and time.    Skin: Skin is warm and dry.    Psychiatric: She has a normal mood and affect. Her behavior is normal. Judgment and thought content normal.       Review of Systems   All other systems reviewed and are negative.    Assessment/Plan:     24 y.o. female  for:    No notes have been filed under this hospital service.  Service: Obstetrics and Gynecology      Disposition: As patient meets milestones, will plan to discharge tomorrow if stable.    Chula Davey CNM  Obstetrics  [facility] -  Labor and Delivery

## 2024-09-19 NOTE — SUBJECTIVE & OBJECTIVE
Interval History: PPD 1    She is doing well this morning. She is tolerating a regular diet without nausea or vomiting. She is voiding spontaneously. She is ambulating. She has passed flatus, and has a BM. Vaginal bleeding is mild. She denies fever or chills. Abdominal pain is mild and controlled with oral medications. She Is not breastfeeding. She desires circumcision for her male baby: yes.    Objective:     Vital Signs (Most Recent):  Temp: 97.1 °F (36.2 °C) (09/19/24 0406)  Pulse: 64 (09/19/24 0406)  Resp: 18 (09/19/24 0406)  BP: 121/67 (09/19/24 0406)  SpO2: 99 % (09/18/24 1919) Vital Signs (24h Range):  Temp:  [97 °F (36.1 °C)-97.6 °F (36.4 °C)] 97.1 °F (36.2 °C)  Pulse:  [55-84] 64  Resp:  [17-18] 18  SpO2:  [99 %] 99 %  BP: ()/(55-91) 121/67     Weight: 100.9 kg (222 lb 8 oz)  Body mass index is 34.85 kg/m².      Intake/Output Summary (Last 24 hours) at 9/19/2024 0817  Last data filed at 9/18/2024 1110  Gross per 24 hour   Intake --   Output 200 ml   Net -200 ml         Significant Labs:  Lab Results   Component Value Date    GROUPTRH B POS 09/18/2024    HEPBSAG Non-Reactive 09/18/2024     Recent Labs   Lab 09/19/24 0358   HGB 7.4*   HCT 25.1*       CBC:   Recent Labs   Lab 09/19/24 0358   WBC 10.02   RBC 3.20*   HGB 7.4*   HCT 25.1*   *   MCV 78.4*   MCH 23.1*   MCHC 29.5*     I have personallly reviewed all pertinent lab results from the last 24 hours.  Recent Lab Results         09/19/24 0358        Baso # 0.03       Basophil % 0.3       Crenated RBC's Few       Differential Method Scan Smear       Eos # 0.03       Eos % 0.3       Hematocrit 25.1       Hemoglobin 7.4       Immature Grans (Abs) 0.06       Immature Granulocytes 0.6       Lymph # 1.75       Lymph % 17.5       MCH 23.1       MCHC 29.5       MCV 78.4       Mono # 0.68       Mono % 6.8       MPV       Neutrophils, Abs 7.47       Neutrophils Relative 74.5       nRBC 0.4       NUCLEATED RBC ABSOLUTE 0.04       PLATELET MORPHOLOGY  Decreased       Platelet Count 118       RBC 3.20       RDW 18.2       WBC 10.02               Physical Exam:   Constitutional: She is oriented to person, place, and time. She appears well-developed and well-nourished.        Pulmonary/Chest: Effort normal.        Abdominal: Soft.     Genitourinary:    Genitourinary Comments: Uterus: firm, midline, 3 below umbilicus  Lochia: scant rubra noted  Perineum: intact, no edema                 Neurological: She is alert and oriented to person, place, and time.    Skin: Skin is warm and dry.    Psychiatric: She has a normal mood and affect. Her behavior is normal. Judgment and thought content normal.       Review of Systems   All other systems reviewed and are negative.

## 2024-09-20 VITALS
HEIGHT: 67 IN | SYSTOLIC BLOOD PRESSURE: 142 MMHG | BODY MASS INDEX: 34.92 KG/M2 | HEART RATE: 58 BPM | OXYGEN SATURATION: 98 % | WEIGHT: 222.5 LBS | TEMPERATURE: 98 F | RESPIRATION RATE: 17 BRPM | DIASTOLIC BLOOD PRESSURE: 83 MMHG

## 2024-09-20 LAB — UA COMPLETE W REFLEX CULTURE PNL UR: NORMAL

## 2024-09-20 PROCEDURE — 25000003 PHARM REV CODE 250: Performed by: ADVANCED PRACTICE MIDWIFE

## 2024-09-20 RX ORDER — IBUPROFEN 800 MG/1
800 TABLET ORAL EVERY 8 HOURS PRN
Qty: 60 TABLET | Refills: 1 | Status: SHIPPED | OUTPATIENT
Start: 2024-09-20

## 2024-09-20 RX ADMIN — Medication 1 TABLET: at 09:09

## 2024-09-20 RX ADMIN — FERROUS SULFATE TAB 325 MG (65 MG ELEMENTAL FE) 1 EACH: 325 (65 FE) TAB at 09:09

## 2024-09-20 RX ADMIN — IBUPROFEN 800 MG: 800 TABLET, FILM COATED ORAL at 12:09

## 2024-09-20 NOTE — DISCHARGE INSTRUCTIONS
Topic Page  Nurse Date Time Comments   PP Education Booklet Given ---- ER 09/20/2024 Discharge booklet given and discussion on topics done.    Skin to skin 18 ER     Rooming in 29 ER     Importance of Exclusive Breastfeeding for 6 mo 35 ER     Bleeding 6 ER     Incision Care 10 ER     Sex 11 ER     Pain Management 8 ER     Perineal Care 9 ER     Minimizing Engorgement 40 ER     Collection & Storage of BM 42-43 ER     S/S of BF Problems  ER     Hand Expression 42 ER     Maternal s/s breast problems 41 ER     Mgnt of Common BF Problems (engorgement, sore & cracked nipples) 40-41 ER     PPD/Anxiety 14-15 ER

## 2024-09-20 NOTE — DISCHARGE SUMMARY
"[facility] -  Labor and Delivery  Obstetrics  Discharge Summary      Patient Name: Nida Gold  MRN: [id number]  Admission Date: 2024  Hospital Length of Stay: 2 days  Discharge Date and Time:  2024 9:35 AM  Attending Physician: Marcos Byrne MD   Discharging Provider: Chula Davey CNM   Primary Care Provider: Kita, Primary Doctor    HPI: 2024 PPD 1, bottle feeding. Denies any problems.         * No surgery found *     Hospital Course:   Patient is a  at 39 0/7 wks gestation that presented for an elective induction of labor. Labor progressed for the patient to deliver via vacuum assisted vaginal delivery a male  with Apgars 8 and 9 weighing 6 pounds 14.9 ounces over an intact perineum with a small perineal abrasion. She is bottle feeding.          Final Active Diagnoses:    Diagnosis Date Noted POA    PRINCIPAL PROBLEM:  Vacuum-assisted vaginal delivery [Z37.9] 2024 No    Third-stage postpartum hemorrhage [O72.0] 2024 Unknown    Encounter for elective induction of labor [Z34.90] 2024 Not Applicable      Problems Resolved During this Admission:        Significant Diagnostic Studies: Labs: CBC   Recent Labs   Lab 24  0358   WBC 10.02   HGB 7.4*   HCT 25.1*   *         Feeding Method: bottle    Immunizations       Date Immunization Status Dose Route/Site Given by    24 0746 MMR Incomplete 0.5 mL Subcutaneous/     24 0746 Tdap Incomplete 0.5 mL Intramuscular/             Delivery:    Episiotomy: None   Lacerations: 1st   Repair suture: None   Repair # of packets:     Blood loss (ml):       Birth information:  YOB: 2025   Time of birth: 6:24 AM   Sex: male   Delivery type: Vaginal, Vacuum (Extractor)   Gestational Age: 39w0d     Measurements    Weight: 3145 g  Weight (lbs): 6 lb 14.9 oz  Length: 53.3 cm  Length (in): 21"         Delivery Clinician: Delivery Providers    Delivering clinician: Chula Davey" IRMA          Additional  information:  Forceps:    Vacuum:    Breech:    Observed anomalies      Living?:     Apgars    Living status: Living  Apgar Component Scores:  1 min.:  5 min.:  10 min.:  15 min.:  20 min.:    Skin color:  0  1       Heart rate:  2  2       Reflex irritability:  2  2       Muscle tone:  2  2       Respiratory effort:  2  2       Total:  8  9                Placenta: Delivered:       appearance  Pending Diagnostic Studies:       None            Discharged Condition: good    Disposition: Home or Self Care    Follow Up:   Follow-up Information       Chula Davey CNM Follow up in 2 week(s).    Specialty: Obstetrics and Gynecology  Why: Postpartum Visit  Contact information:  [address]  [facility] [address]  [phone]                           Patient Instructions:      Diet Adult Regular     No driving until:   Order Comments: No driving for 2 weeks     Notify your health care provider if you experience any of the following:  temperature >100.4     Notify your health care provider if you experience any of the following:  persistent nausea and vomiting or diarrhea     Notify your health care provider if you experience any of the following:  severe uncontrolled pain     Notify your health care provider if you experience any of the following:  difficulty breathing or increased cough     Notify your health care provider if you experience any of the following:  severe persistent headache     Notify your health care provider if you experience any of the following:  worsening rash     Notify your health care provider if you experience any of the following:  persistent dizziness, light-headedness, or visual disturbances     Notify your health care provider if you experience any of the following:  increased confusion or weakness     Activity as tolerated     Medications:  Current Discharge Medication List        START taking these medications    Details   ibuprofen (ADVIL,MOTRIN) 800 MG tablet  Take 1 tablet (800 mg total) by mouth every 8 (eight) hours as needed.  Qty: 60 tablet, Refills: 1           CONTINUE these medications which have NOT CHANGED    Details   albuterol (VENTOLIN HFA) 90 mcg/actuation inhaler Inhale 2 puffs into the lungs every 6 (six) hours as needed for Wheezing. Rescue  Qty: 18 g, Refills: 0      ferrous sulfate 325 (65 FE) MG EC tablet Take 1 tablet (325 mg total) by mouth 2 (two) times daily.  Qty: 60 tablet, Refills: 4    Associated Diagnoses: Anemia, unspecified type           STOP taking these medications       NIFEdipine (PROCARDIA) 10 MG Cap Comments:   Reason for Stopping:         nitrofurantoin, macrocrystal-monohydrate, (MACROBID) 100 MG capsule Comments:   Reason for Stopping:               Chula Davey CNM  Obstetrics  [facility] -  Labor and Delivery

## 2024-09-20 NOTE — PLAN OF CARE
Problem: Adult Inpatient Plan of Care  Goal: Plan of Care Review  2024 by Lupe Tong RN  Outcome: Met  2024 0847 by Lupe Tong RN  Outcome: Progressing  Goal: Patient-Specific Goal (Individualized)  2024 by Lupe Tong RN  Outcome: Met  2024 0847 by Lupe Tong RN  Outcome: Progressing  Goal: Absence of Hospital-Acquired Illness or Injury  2024 by Lupe Tong RN  Outcome: Met  2024 0847 by Lupe Tong RN  Outcome: Progressing  Goal: Optimal Comfort and Wellbeing  2024 by Lupe Tong RN  Outcome: Met  2024 08 by Lupe Tong RN  Outcome: Progressing  Goal: Readiness for Transition of Care  2024 by Lupe Tong RN  Outcome: Met  2024 0847 by Lupe Tong RN  Outcome: Progressing     Problem:  Fall Injury Risk  Goal: Absence of Fall, Infant Drop and Related Injury  2024 by Lupe Tong RN  Outcome: Met  2024 0847 by Lupe Tong RN  Outcome: Progressing     Problem: Infection  Goal: Absence of Infection Signs and Symptoms  2024 by Lupe Tong RN  Outcome: Met  2024 08 by Lupe Tong RN  Outcome: Progressing     Problem: Postpartum (Vaginal Delivery)  Goal: Successful Parent Role Transition  2024 by Lupe Tong RN  Outcome: Met  2024 0847 by Lupe Tong RN  Outcome: Progressing  Goal: Hemostasis  2024 by Lupe Tong RN  Outcome: Met  2024 0847 by Lupe Tong RN  Outcome: Progressing  Goal: Absence of Infection Signs and Symptoms  2024 by Lupe Tong RN  Outcome: Met  2024 0847 by Lupe Tong RN  Outcome: Progressing  Goal: Anesthesia/Sedation Recovery  2024 1557 by Lupe Tong, RN  Outcome: Met  2024 0847 by Lupe Tong RN  Outcome: Progressing  Goal: Optimal Pain Control and Function  2024 1557 by Lupe Tong, RN  Outcome: Met  2024 0847 by Lupe Tong, RN  Outcome: Progressing  Goal: Effective  Urinary Elimination  9/20/2024 1557 by Lupe Tong, RN  Outcome: Met  9/20/2024 0847 by Lupe Tong, RN  Outcome: Progressing

## 2024-09-20 NOTE — NURSING
Topic Nurse Date Time Comments   PP Education Booklet Given KB 9/20/2024   [id number]    Skin to skin       Rooming in       Importance of Exclusive Breastfeeding for 6 mo       Bleeding KB      Incision Care       Sex KB      Pain Management KB      Perineal Care KB      Minimizing Engorgement       Collection & Storage of BM       S/S of BF Problems       Hand Expression       Maternal s/s breast problems KB      Mgnt of Common BF Problems (engorgement, sore & cracked nipples)       PPD/Anxiety KB

## 2024-09-21 ENCOUNTER — HOSPITAL ENCOUNTER (EMERGENCY)
Facility: HOSPITAL | Age: 24
Discharge: HOME | End: 2024-09-21
Payer: MEDICAID

## 2024-09-21 VITALS
WEIGHT: 217.38 LBS | OXYGEN SATURATION: 97 % | DIASTOLIC BLOOD PRESSURE: 87 MMHG | TEMPERATURE: 103 F | SYSTOLIC BLOOD PRESSURE: 134 MMHG | RESPIRATION RATE: 19 BRPM | HEART RATE: 110 BPM | HEIGHT: 67 IN | BODY MASS INDEX: 34.12 KG/M2

## 2024-09-21 DIAGNOSIS — N39.0 URINARY TRACT INFECTION WITHOUT HEMATURIA, SITE UNSPECIFIED: Primary | ICD-10-CM

## 2024-09-21 PROBLEM — O92.79: Status: ACTIVE | Noted: 2024-09-21

## 2024-09-21 PROBLEM — R50.9 FEVER: Status: ACTIVE | Noted: 2024-09-21

## 2024-09-21 PROBLEM — M54.9 BACK PAIN: Status: ACTIVE | Noted: 2024-09-21

## 2024-09-21 LAB
ALBUMIN SERPL BCP-MCNC: 2.3 G/DL (ref 3.5–5)
ALBUMIN/GLOB SERPL: 0.5 {RATIO}
ALP SERPL-CCNC: 143 U/L (ref 37–98)
ALT SERPL W P-5'-P-CCNC: 46 U/L (ref 13–56)
ANION GAP SERPL CALCULATED.3IONS-SCNC: 10 MMOL/L (ref 7–16)
ANISOCYTOSIS BLD QL SMEAR: ABNORMAL
AST SERPL W P-5'-P-CCNC: 59 U/L (ref 15–37)
ATYPICAL LYMPHOCYTES: ABNORMAL
BACTERIA #/AREA URNS HPF: ABNORMAL /HPF
BASOPHILS # BLD AUTO: 0.01 K/UL (ref 0–0.2)
BASOPHILS NFR BLD AUTO: 0.3 % (ref 0–1)
BILIRUB SERPL-MCNC: 0.5 MG/DL (ref ?–1.2)
BILIRUB UR QL STRIP: NEGATIVE
BUN SERPL-MCNC: 11 MG/DL (ref 7–18)
BUN/CREAT SERPL: 13 (ref 6–20)
CALCIUM SERPL-MCNC: 8.3 MG/DL (ref 8.5–10.1)
CHLORIDE SERPL-SCNC: 109 MMOL/L (ref 98–107)
CLARITY UR: ABNORMAL
CO2 SERPL-SCNC: 27 MMOL/L (ref 21–32)
COLOR UR: ABNORMAL
CREAT SERPL-MCNC: 0.83 MG/DL (ref 0.55–1.02)
DIFFERENTIAL METHOD BLD: ABNORMAL
EGFR (NO RACE VARIABLE) (RUSH/TITUS): 100 ML/MIN/1.73M2
EOSINOPHIL # BLD AUTO: 0.03 K/UL (ref 0–0.5)
EOSINOPHIL NFR BLD AUTO: 0.9 % (ref 1–4)
EOSINOPHIL NFR BLD MANUAL: 4 % (ref 1–4)
ERYTHROCYTE [DISTWIDTH] IN BLOOD BY AUTOMATED COUNT: 21.1 % (ref 11.5–14.5)
GLOBULIN SER-MCNC: 4.2 G/DL (ref 2–4)
GLUCOSE SERPL-MCNC: 97 MG/DL (ref 74–106)
GLUCOSE UR STRIP-MCNC: NORMAL MG/DL
HCT VFR BLD AUTO: 28.9 % (ref 38–47)
HGB BLD-MCNC: 8.4 G/DL (ref 12–16)
HYPOCHROMIA BLD QL SMEAR: ABNORMAL
IMM GRANULOCYTES # BLD AUTO: 0.05 K/UL (ref 0–0.04)
IMM GRANULOCYTES NFR BLD: 1.5 % (ref 0–0.4)
KETONES UR STRIP-SCNC: NEGATIVE MG/DL
LEUKOCYTE ESTERASE UR QL STRIP: ABNORMAL
LYMPHOCYTES # BLD AUTO: 0.49 K/UL (ref 1–4.8)
LYMPHOCYTES NFR BLD AUTO: 14.6 % (ref 27–41)
LYMPHOCYTES NFR BLD MANUAL: 15 % (ref 27–41)
MCH RBC QN AUTO: 23.7 PG (ref 27–31)
MCHC RBC AUTO-ENTMCNC: 29.1 G/DL (ref 32–36)
MCV RBC AUTO: 81.6 FL (ref 80–96)
METAMYELOCYTES NFR BLD MANUAL: 1 %
MONOCYTES # BLD AUTO: 0.02 K/UL (ref 0–0.8)
MONOCYTES NFR BLD AUTO: 0.6 % (ref 2–6)
MONOCYTES NFR BLD MANUAL: 1 % (ref 2–6)
MPC BLD CALC-MCNC: ABNORMAL G/DL
MUCOUS, UA: ABNORMAL /LPF
NEUTROPHILS # BLD AUTO: 2.76 K/UL (ref 1.8–7.7)
NEUTROPHILS NFR BLD AUTO: 82.1 % (ref 53–65)
NEUTS BAND NFR BLD MANUAL: 4 % (ref 1–5)
NEUTS SEG NFR BLD MANUAL: 75 % (ref 50–62)
NITRITE UR QL STRIP: NEGATIVE
NRBC # BLD AUTO: 0.09 X10E3/UL
NRBC BLD MANUAL-RTO: 6 /100 WBC
NRBC, AUTO (.00): 2.7 %
OVALOCYTES BLD QL SMEAR: ABNORMAL
PH UR STRIP: 7.5 PH UNITS
PLATELET # BLD AUTO: 99 K/UL (ref 150–400)
PLATELET MORPHOLOGY: ABNORMAL
POLYCHROMASIA BLD QL SMEAR: ABNORMAL
POTASSIUM SERPL-SCNC: 3.1 MMOL/L (ref 3.5–5.1)
PROT SERPL-MCNC: 6.5 G/DL (ref 6.4–8.2)
PROT UR QL STRIP: 70
RBC # BLD AUTO: 3.54 M/UL (ref 4.2–5.4)
RBC # UR STRIP: ABNORMAL /UL
RBC #/AREA URNS HPF: >182 /HPF
SARS-COV-2 RDRP RESP QL NAA+PROBE: NEGATIVE
SODIUM SERPL-SCNC: 143 MMOL/L (ref 136–145)
SP GR UR STRIP: 1.02
SQUAMOUS #/AREA URNS LPF: ABNORMAL /HPF
TRANS CELLS #/AREA URNS LPF: ABNORMAL /LPF
UROBILINOGEN UR STRIP-ACNC: 3 MG/DL
WBC # BLD AUTO: 3.36 K/UL (ref 4.5–11)
WBC #/AREA URNS HPF: >182 /HPF
WBC CLUMPS, UA: ABNORMAL /HPF

## 2024-09-21 PROCEDURE — 81003 URINALYSIS AUTO W/O SCOPE: CPT | Performed by: NURSE PRACTITIONER

## 2024-09-21 PROCEDURE — 99285 EMERGENCY DEPT VISIT HI MDM: CPT | Mod: 25

## 2024-09-21 PROCEDURE — 87635 SARS-COV-2 COVID-19 AMP PRB: CPT | Performed by: NURSE PRACTITIONER

## 2024-09-21 PROCEDURE — 96375 TX/PRO/DX INJ NEW DRUG ADDON: CPT

## 2024-09-21 PROCEDURE — 36415 COLL VENOUS BLD VENIPUNCTURE: CPT | Performed by: NURSE PRACTITIONER

## 2024-09-21 PROCEDURE — 96365 THER/PROPH/DIAG IV INF INIT: CPT

## 2024-09-21 PROCEDURE — 25000003 PHARM REV CODE 250: Performed by: NURSE PRACTITIONER

## 2024-09-21 PROCEDURE — 96361 HYDRATE IV INFUSION ADD-ON: CPT

## 2024-09-21 PROCEDURE — 87186 SC STD MICRODIL/AGAR DIL: CPT | Performed by: NURSE PRACTITIONER

## 2024-09-21 PROCEDURE — 85025 COMPLETE CBC W/AUTO DIFF WBC: CPT | Performed by: NURSE PRACTITIONER

## 2024-09-21 PROCEDURE — 87086 URINE CULTURE/COLONY COUNT: CPT | Performed by: NURSE PRACTITIONER

## 2024-09-21 PROCEDURE — 87077 CULTURE AEROBIC IDENTIFY: CPT | Performed by: NURSE PRACTITIONER

## 2024-09-21 PROCEDURE — 87040 BLOOD CULTURE FOR BACTERIA: CPT | Performed by: NURSE PRACTITIONER

## 2024-09-21 PROCEDURE — 63600175 PHARM REV CODE 636 W HCPCS: Performed by: NURSE PRACTITIONER

## 2024-09-21 PROCEDURE — 87149 DNA/RNA DIRECT PROBE: CPT | Performed by: NURSE PRACTITIONER

## 2024-09-21 PROCEDURE — 80053 COMPREHEN METABOLIC PANEL: CPT | Performed by: NURSE PRACTITIONER

## 2024-09-21 RX ORDER — MORPHINE SULFATE 4 MG/ML
4 INJECTION, SOLUTION INTRAMUSCULAR; INTRAVENOUS
Status: COMPLETED | OUTPATIENT
Start: 2024-09-21 | End: 2024-09-21

## 2024-09-21 RX ORDER — ACETAMINOPHEN 500 MG
1000 TABLET ORAL
Status: COMPLETED | OUTPATIENT
Start: 2024-09-21 | End: 2024-09-21

## 2024-09-21 RX ORDER — IBUPROFEN 800 MG/1
800 TABLET ORAL
Status: COMPLETED | OUTPATIENT
Start: 2024-09-21 | End: 2024-09-21

## 2024-09-21 RX ORDER — CEFPODOXIME PROXETIL 200 MG/1
200 TABLET, FILM COATED ORAL EVERY 12 HOURS
Qty: 20 TABLET | Refills: 0 | Status: SHIPPED | OUTPATIENT
Start: 2024-09-21 | End: 2024-10-01

## 2024-09-21 RX ORDER — ONDANSETRON HYDROCHLORIDE 2 MG/ML
4 INJECTION, SOLUTION INTRAVENOUS
Status: COMPLETED | OUTPATIENT
Start: 2024-09-21 | End: 2024-09-21

## 2024-09-21 RX ADMIN — ACETAMINOPHEN 1000 MG: 500 TABLET ORAL at 06:09

## 2024-09-21 RX ADMIN — PIPERACILLIN AND TAZOBACTAM 4.5 G: 4; .5 INJECTION, POWDER, FOR SOLUTION INTRAVENOUS; PARENTERAL at 07:09

## 2024-09-21 RX ADMIN — SODIUM CHLORIDE 3000 ML: 9 INJECTION, SOLUTION INTRAVENOUS at 07:09

## 2024-09-21 RX ADMIN — IBUPROFEN 800 MG: 800 TABLET, FILM COATED ORAL at 08:09

## 2024-09-21 RX ADMIN — MORPHINE SULFATE 4 MG: 4 INJECTION, SOLUTION INTRAMUSCULAR; INTRAVENOUS at 08:09

## 2024-09-21 RX ADMIN — ONDANSETRON 4 MG: 2 INJECTION INTRAMUSCULAR; INTRAVENOUS at 08:09

## 2024-09-21 NOTE — ED NOTES
Pt being transferred to [facility] for care per Poornima Wu FNP direction. [facility] called by nurse to find out what room pt needed to be transferred to and [facility] stated that pt needed to be seen down here. NP notified and NP contacting OB Hospitalist, Dr. Gates.

## 2024-09-22 LAB — VERIGENE RESULT: ABNORMAL

## 2024-09-22 NOTE — DISCHARGE INSTRUCTIONS
Use prescriptions as directed.Alternate tylenol and ibuprofen as needed for pain or fever. Ensure you are drinking plenty of fluids. Follow up with your OB/GYN as previously directed or sooner if no improvement. Return to the ED for worsening signs and symptoms or otherwise as needed.

## 2024-09-22 NOTE — ASSESSMENT & PLAN NOTE
No evidence of mastitis or endometritis.  Continued monitoring for these conditions in the puerperium

## 2024-09-22 NOTE — ASSESSMENT & PLAN NOTE
Tenderness over epidural injection site w/o si/sy of CSF leak.  No erythema around site  No neurologic findings to suggest an CNS infection

## 2024-09-22 NOTE — HPI
OB Consult    25yo  PPD#3 s/p VAVD (YRN Davey, IRMA) coming to ER with report of fever, chills and pain at epidural site.    She is NOT breastfeeding.    She does report breast engorgement anad pain  She reports normal lochia  She denies abdominal or pelvic pain    She reports pain over the epidural site    She reports fever, chills  She denies photophobia or neck stiffness

## 2024-09-22 NOTE — SUBJECTIVE & OBJECTIVE
OB History    Para Term  AB Living   3 3 3 0 0 3   SAB IAB Ectopic Multiple Live Births   0 0 0 0 3      # Outcome Date GA Lbr Montez/2nd Weight Sex Type Anes PTL Lv   3 Term 23 39w0d  3.145 kg (6 lb 14.9 oz) M Vag-Vacuum EPI N SAMY      Complications: Fetal Intolerance      Name: Salvador Gold      Apgar1: 8  Apgar5: 9   2 Term 10/01/19 39w0d  3.175 kg (7 lb) F Vag-Spont EPI N SAMY   1 Term 16 41w0d  3.6 kg (7 lb 15 oz) F Vag-Spont EPI  SAMY     Past Medical History:   Diagnosis Date    Menorrhagia with irregular cycle 2022     History reviewed. No pertinent surgical history.    (Not in a hospital admission)      Review of patient's allergies indicates:  No Known Allergies     Family History       Problem Relation (Age of Onset)    Hypertension Mother          Tobacco Use    Smoking status: Never     Passive exposure: Never    Smokeless tobacco: Never   Substance and Sexual Activity    Alcohol use: Never    Drug use: Never    Sexual activity: Yes     Partners: Male     Review of Systems   Constitutional:  Positive for chills and fever.   HENT:  Negative for nasal congestion and mouth sores.    Eyes:  Negative for visual disturbance.   Respiratory:  Negative for cough, shortness of breath and wheezing.    Cardiovascular:  Negative for chest pain, palpitations and leg swelling.   Gastrointestinal:  Negative for abdominal pain, blood in stool, constipation, diarrhea, nausea and vomiting.   Genitourinary:  Negative for bladder incontinence, dysuria, flank pain, frequency, genital sores, hematuria, pelvic pain, vaginal bleeding (normal lochia), vaginal pain and vaginal odor.   Musculoskeletal:  Positive for back pain. Negative for arthralgias, joint swelling, leg pain and myalgias.   Integumentary:  Positive for breast tenderness (right > left , + engorged).   Neurological:  Negative for vertigo, seizures, syncope and headaches.   Breast: Positive for tenderness (right > left , +  engorged).     Objective:     Vital Signs (Most Recent):  Temp: (!) 103.1 °F (39.5 °C) (09/21/24 1805)  Pulse: 110 (09/21/24 1805)  Resp: 19 (09/21/24 2018)  BP: 134/87 (09/21/24 1805)  SpO2: 97 % (09/21/24 1805) Vital Signs (24h Range):  Temp:  [103.1 °F (39.5 °C)] 103.1 °F (39.5 °C)  Pulse:  [110] 110  Resp:  [19-20] 19  SpO2:  [97 %] 97 %  BP: (134)/(87) 134/87     Weight: 98.6 kg (217 lb 6.4 oz)  Body mass index is 34.05 kg/m².    Patient's last menstrual period was 12/20/2023 (exact date).     Physical Exam:   Constitutional: She is oriented to person, place, and time. She appears well-developed and well-nourished.    HENT:   Head: Normocephalic and atraumatic.    Eyes: Pupils are equal, round, and reactive to light. EOM are normal.     Cardiovascular:  Normal rate and regular rhythm.      Exam reveals no edema.        Pulmonary/Chest: Effort normal and breath sounds normal. No respiratory distress. Right breast exhibits tenderness. Right breast exhibits no inverted nipple, no mass, no skin change, no bleeding and no swelling. Left breast exhibits no inverted nipple, no mass, no skin change, no bleeding and no swelling. Breasts are symmetrical.            Abdominal: Soft. Bowel sounds are normal. She exhibits no distension and no mass. There is no abdominal tenderness. There is no rebound and no guarding.   FF and below umbilicus  Uterus NON-tender     Genitourinary:    Pelvic exam was performed with patient supine.   The external female genitalia was normal.   No external genitalia lesions identified,Genitalia hair distrobution normal .             Musculoskeletal: Normal range of motion.       Neurological: She is alert and oriented to person, place, and time. She has normal reflexes. She displays normal reflexes. No cranial nerve deficit. She exhibits normal muscle tone. Coordination normal.   NO neck stiffness    Skin: Skin is warm and dry. No rash noted. No erythema.    Psychiatric: She has a normal mood  and affect. Her behavior is normal.        Laboratory:  Recent Lab Results         09/21/24 1956 09/21/24 1942 09/21/24  1846        Albumin/Globulin Ratio     0.5       Albumin     2.3       ALP     143       ALT     46       Anion Gap     10       Aniso     2+       Appearance, UA Turbid           AST     59       Atypical Lymphocytes     Few       Bacteria, UA Many           Bands     4       Baso #     0.01       Basophil %     0.3       Bilirubin (UA) Negative           BILIRUBIN TOTAL     0.5       BUN     11       BUN/CREAT RATIO     13       Calcium     8.3       Chloride     109       CO2     27       Color, UA Light Red           SARS COV-2 MOLECULAR   Negative         Creatinine     0.83       Differential Method     Scan Smear       eGFR     100       Eos #     0.03       Eos %     0.9            4       Globulin, Total     4.2       Glucose     97       Glucose, UA Normal           Hematocrit     28.9       Hemoglobin     8.4       Hypo     Few       Immature Grans (Abs)     0.05       Immature Granulocytes     1.5       Ketones, UA Negative           Leukocyte Esterase, UA Large           Lymph #     0.49       Lymph %     14.6            15       MCH     23.7       MCHC     29.1       MCV     81.6       Metamyelocytes     1       Mono #     0.02       Mono %     0.6            1       MPV           Mucous Occasional           Neutrophils, Abs     2.76       Neutrophils Relative     82.1       NITRITE UA Negative           nRBC     6            2.7       NUCLEATED RBC ABSOLUTE     0.09       Blood, UA Large           Ovalocytes     Few       pH, UA 7.5           PLATELET MORPHOLOGY     Large & Giant Platelets       Platelet Count     99       Poly     Few       Potassium     3.1       PROTEIN TOTAL     6.5       Protein, UA 70           RBC     3.54       RBC, UA >182           RDW     21.1       Segmented Neutrophils, Man %     75       Sodium     143       Spec Grav UA 1.018           Squamous  Epithelial Cells, UA Occasional           Trans Epithel, UA Occasional           UROBILINOGEN UA 3           WBC Clumps, UA Few           WBC, UA >182           WBC     3.36             I have personallly reviewed all pertinent lab results from the last 24 hours.    Diagnostic Results:  US: Reviewed

## 2024-09-22 NOTE — ASSESSMENT & PLAN NOTE
Patient counseled to massage/decompress breasts and then use cool packs to start ductal involution as she is NOT breastfeed.    Monitor for bloody discharge  / erythema  Return to ER if worsens    Received Zosyn in ER and will receive Vantin for UTI (this will treat mastitis if present)

## 2024-09-22 NOTE — ED PROVIDER NOTES
Encounter Date: 2024       History     Chief Complaint   Patient presents with    Fever    Chills    Headache     Pt presents to ED via POV with c/o fever, chills, and headache since last night. Pt was released from hospital yesterday after having vaginal delivery of healthy  with no complications.      23 y/o AAF presents to the emergency department with c/o fever, chills and headache that started last night. She is two days post vaginal delivery, . She denies abd pain, nausea, vomiting. She denies cough, shortness of breath or chest pain. She reports she is having some pain with urination but has been having this since she delivered. She reports some mild breast tenderness bilaterally. She is not breastfeeding. She has had nothing for her symptoms. She knows of no particular exacerbating or remitting factors.    The history is provided by the patient and medical records.     Review of patient's allergies indicates:  No Known Allergies  Past Medical History:   Diagnosis Date    Menorrhagia with irregular cycle 2022     History reviewed. No pertinent surgical history.  Family History   Problem Relation Name Age of Onset    Hypertension Mother       Social History     Tobacco Use    Smoking status: Never     Passive exposure: Never    Smokeless tobacco: Never   Substance Use Topics    Alcohol use: Never    Drug use: Never     Review of Systems   All other systems reviewed and are negative.      Physical Exam     Initial Vitals [24 1805]   BP Pulse Resp Temp SpO2   134/87 110 20 (!) 103.1 °F (39.5 °C) 97 %      MAP       --         Physical Exam    Constitutional: She appears well-developed and well-nourished. She is cooperative.  Non-toxic appearance.   Cardiovascular:  Regular rhythm, normal heart sounds and normal pulses.   Tachycardia present.         Pulmonary/Chest: Effort normal and breath sounds normal.   Bilateral breast tenderness. No obvious induration, erythema, warmth.     Abdominal: Abdomen is soft. Bowel sounds are normal. There is no abdominal tenderness.     Neurological: She is alert and oriented to person, place, and time.   Skin: Skin is warm, dry and intact. Capillary refill takes less than 2 seconds.         Medical Screening Exam   See Full Note    ED Course   Procedures  Labs Reviewed   COMPREHENSIVE METABOLIC PANEL - Abnormal       Result Value    Sodium 143      Potassium 3.1 (*)     Chloride 109 (*)     CO2 27      Anion Gap 10      Glucose 97      BUN 11      Creatinine 0.83      BUN/Creatinine Ratio 13      Calcium 8.3 (*)     Total Protein 6.5      Albumin 2.3 (*)     Globulin 4.2 (*)     A/G Ratio 0.5      Bilirubin, Total 0.5      Alk Phos 143 (*)     ALT 46      AST 59 (*)     eGFR 100     URINALYSIS, REFLEX TO URINE CULTURE - Abnormal    Color, UA Light Red (*)     Clarity, UA Turbid      pH, UA 7.5      Leukocytes, UA Large (*)     Nitrites, UA Negative      Protein, UA 70 (*)     Glucose, UA Normal      Ketones, UA Negative      Urobilinogen, UA 3 (*)     Bilirubin, UA Negative      Blood, UA Large (*)     Specific Gravity, UA 1.018     CBC WITH DIFFERENTIAL - Abnormal    WBC 3.36 (*)     RBC 3.54 (*)     Hemoglobin 8.4 (*)     Hematocrit 28.9 (*)     MCV 81.6      MCH 23.7 (*)     MCHC 29.1 (*)     RDW 21.1 (*)     Platelet Count 99 (*)     MPV        Neutrophils % 82.1 (*)     Lymphocytes % 14.6 (*)     Monocytes % 0.6 (*)     Eosinophils % 0.9 (*)     Basophils % 0.3      Immature Granulocytes % 1.5 (*)     nRBC, Auto 2.7 (*)     Neutrophils, Abs 2.76      Lymphocytes, Absolute 0.49 (*)     Monocytes, Absolute 0.02      Eosinophils, Absolute 0.03      Basophils, Absolute 0.01      Immature Granulocytes, Absolute 0.05 (*)     nRBC, Absolute 0.09 (*)     Diff Type Scan Smear     MANUAL DIFFERENTIAL - Abnormal    Segmented Neutrophils, Man % 75 (*)     Bands, Man % 4      Lymphocytes, Man % 15 (*)     Monocytes, Man % 1 (*)     Eosinophils, Man % 4       Metamyelocytes, Man % 1      nRBC, Manual 6 (*)     Platelet Morphology Large & Giant Platelets (*)     Anisocytosis 2+      Ovalocytes Few      Polychromasia Few      Hypochromic Few      Atypical Lymphocytes Few     URINALYSIS, MICROSCOPIC - Abnormal    WBC, UA >182 (*)     RBC, UA >182 (*)     Bacteria, UA Many (*)     Squamous Epithelial Cells, UA Occasional (*)     WBC Clumps Few (*)     Transitional Epithelial Cells, UA Occasional (*)     Mucous Occasional (*)    SARS-COV-2 RNA AMPLIFICATION, QUAL - Normal    SARS COV-2 Molecular Negative      Narrative:     Negative SARS-CoV results should not be used as the sole basis for treatment or patient management decisions; negative results should be considered in the context of a patient's recent exposures, history and the presene of clinical signs and symptoms consistent with COVID-19.  Negative results should be treated as presumptive and confirmed by molecular assay, if necessary for patient management.   CULTURE, BLOOD   CULTURE, BLOOD   CULTURE, URINE   CBC W/ AUTO DIFFERENTIAL    Narrative:     The following orders were created for panel order CBC auto differential.  Procedure                               Abnormality         Status                     ---------                               -----------         ------                     CBC with Differential[[id number]]       Abnormal            Final result               Manual Differential[[id number]]         Abnormal            Final result                 Please view results for these tests on the individual orders.          Imaging Results               US Pelvis Complete Non OB (Final result)  Result time 09/21/24 20:25:06      Final result by Kael Riley MD (09/21/24 20:25:06)                   Impression:      Mildly thickened, echogenic heterogeneous endometrial stripe measuring 1.7 cm.  Mild hemorrhage or postprocedure changes are considerations.  Minimal retained products or endometritis would  not be completely excluded.  Recommend gyn follow-up.    Nondistention of the urinary bladder.  Mild wall thickening or cystitis would not be excluded.  This report was flagged in Epic as abnormal.      Electronically signed by: Kael Gary  Date:    09/21/2024  Time:    20:25               Narrative:    EXAMINATION:  US PELVIS COMPLETE NON OB    CLINICAL HISTORY:  fever, chills, s/p vaginal delivery 09/18;    TECHNIQUE:  Pelvic ultrasound.  Transabdominal only pelvic images are submitted.  The lack of transvaginal component may diminish the sensitivity.    COMPARISON:  08/28/2024    FINDINGS:  The uterus measures 17.6 x 8.6 x 12.5 cm.    Mildly thickened and echogenic endometrial stripe measuring 1.7 cm.    Uterus appears mildly edematous with history of recent vaginal delivery.    Right ovary measures 2.9 x 1.3 x 2.4 cm.  The left ovary measures 2.8 x 1.3 x 2.4 cm.  There is color and Doppler flow to the ovaries bilaterally.    Few small nabothian cysts near the cervix.    No free fluid in the pelvis.    The urinary bladder is nondistended with limited characterization.  Mild wall thickening is not excluded.                                       Medications   sodium chloride 0.9% bolus 2,958 mL 2,958 mL (0 mLs Intravenous Stopped 9/21/24 2116)   acetaminophen tablet 1,000 mg (1,000 mg Oral Given 9/21/24 1830)   piperacillin-tazobactam (ZOSYN) 4.5 g in D5W 100 mL IVPB (MB+) (0 g Intravenous Stopped 9/21/24 1956)   morphine injection 4 mg (4 mg Intravenous Given 9/21/24 2018)   ondansetron injection 4 mg (4 mg Intravenous Given 9/21/24 2018)   ibuprofen tablet 800 mg (800 mg Oral Given 9/21/24 2000)     Medical Decision Making  23 y/o AAF presents to the emergency department with c/o fever, chills and headache that started last night. She is two days post vaginal delivery, 09/19. She denies abd pain, nausea, vomiting. She denies cough, shortness of breath or chest pain. She reports she is having some pain with  urination but has been having this since she delivered. She reports some mild breast tenderness bilaterally. She is not breastfeeding. She has had nothing for her symptoms. She knows of no particular exacerbating or remitting factors.    Problems Addressed:  Urinary tract infection without hematuria, site unspecified:     Details: Fever treated with Tylenol. Sepsis protocol initiated. She has received IVFs 30mL/kg bolus, Zosyn after cultures were obtained. Urinalysis with significant evidence of UTI. US results reviewed with Dr. Gates, OB Hospitalist on call. WBC count is 3.3, mildly anemic but no worse than at discharge. Fever improved and patient reports feeling better. Analgesics given and pain improved. Rx Vantin, counseled on use and supportive measures. Strict follow up instructions given. Warning s/s discussed and return precautions given; the patient has v/u.      Amount and/or Complexity of Data Reviewed  Labs: ordered.  Radiology: ordered.  Discussion of management or test interpretation with external provider(s): Patient discussed with OB Hospitalist on call, Dr. Gates. Patient evaluated and examined by him. Labs, US and other work up reviewed by him; recommended starting patient on Vantin. She was given a dose of IV Zosyn here in the emergency department after urine and blood cultures were obtained.     Risk  OTC drugs.  Prescription drug management.                                      Clinical Impression:   Final diagnoses:  [N39.0] Urinary tract infection without hematuria, site unspecified (Primary)        ED Disposition Condition    Discharge Stable          ED Prescriptions       Medication Sig Dispense Start Date End Date Auth. Provider    cefpodoxime (VANTIN) 200 MG tablet Take 1 tablet (200 mg total) by mouth every 12 (twelve) hours. for 10 days 20 tablet 9/21/2024 10/1/2024 Poornima Wu FNP          Follow-up Information       Follow up With Specialties Details Why Contact Info    OB/GYN    As needed              Poornima Wu, [facility]  09/21/24 2155

## 2024-09-22 NOTE — CONSULTS
[facility] - Emergency Department  Obstetrics & Gynecology  Consult Note    Patient Name: Nida Gold  MRN: [id number]  Admission Date: 2024  Hospital Length of Stay: 0 days  Code Status: Prior  Primary Care Provider: No, Primary Doctor  Principal Problem: <principal problem not specified>    Consults  Subjective:     Chief Complaint: Back pain and fever    History of Present Illness:    OB Consult    23yo  PPD#3 s/p VAVD (N Petar, CNM) coming to ER with report of fever, chills and pain at epidural site.    She is NOT breastfeeding.    She does report breast engorgement anad pain  She reports normal lochia  She denies abdominal or pelvic pain    She reports pain over the epidural site    She reports fever, chills  She denies photophobia or neck stiffness            OB History    Para Term  AB Living   3 3 3 0 0 3   SAB IAB Ectopic Multiple Live Births   0 0 0 0 3      # Outcome Date GA Lbr Montez/2nd Weight Sex Type Anes PTL Lv   3 Term 23 39w0d  3.145 kg (6 lb 14.9 oz) M Vag-Vacuum EPI N SAMY      Complications: Fetal Intolerance      Name: Salvador Gold      Apgar1: 8  Apgar5: 9   2 Term 10/01/19 39w0d  3.175 kg (7 lb) F Vag-Spont EPI N SAMY   1 Term 16 41w0d  3.6 kg (7 lb 15 oz) F Vag-Spont EPI  SAMY     Past Medical History:   Diagnosis Date    Menorrhagia with irregular cycle 2022     History reviewed. No pertinent surgical history.    (Not in a hospital admission)      Review of patient's allergies indicates:  No Known Allergies     Family History       Problem Relation (Age of Onset)    Hypertension Mother          Tobacco Use    Smoking status: Never     Passive exposure: Never    Smokeless tobacco: Never   Substance and Sexual Activity    Alcohol use: Never    Drug use: Never    Sexual activity: Yes     Partners: Male     Review of Systems   Constitutional:  Positive for chills and fever.   HENT:  Negative for nasal congestion and mouth sores.     Eyes:  Negative for visual disturbance.   Respiratory:  Negative for cough, shortness of breath and wheezing.    Cardiovascular:  Negative for chest pain, palpitations and leg swelling.   Gastrointestinal:  Negative for abdominal pain, blood in stool, constipation, diarrhea, nausea and vomiting.   Genitourinary:  Negative for bladder incontinence, dysuria, flank pain, frequency, genital sores, hematuria, pelvic pain, vaginal bleeding (normal lochia), vaginal pain and vaginal odor.   Musculoskeletal:  Positive for back pain. Negative for arthralgias, joint swelling, leg pain and myalgias.   Integumentary:  Positive for breast tenderness (right > left , + engorged).   Neurological:  Negative for vertigo, seizures, syncope and headaches.   Breast: Positive for tenderness (right > left , + engorged).     Objective:     Vital Signs (Most Recent):  Temp: (!) 103.1 °F (39.5 °C) (09/21/24 1805)  Pulse: 110 (09/21/24 1805)  Resp: 19 (09/21/24 2018)  BP: 134/87 (09/21/24 1805)  SpO2: 97 % (09/21/24 1805) Vital Signs (24h Range):  Temp:  [103.1 °F (39.5 °C)] 103.1 °F (39.5 °C)  Pulse:  [110] 110  Resp:  [19-20] 19  SpO2:  [97 %] 97 %  BP: (134)/(87) 134/87     Weight: 98.6 kg (217 lb 6.4 oz)  Body mass index is 34.05 kg/m².    Patient's last menstrual period was 12/20/2023 (exact date).     Physical Exam:   Constitutional: She is oriented to person, place, and time. She appears well-developed and well-nourished.    HENT:   Head: Normocephalic and atraumatic.    Eyes: Pupils are equal, round, and reactive to light. EOM are normal.     Cardiovascular:  Normal rate and regular rhythm.      Exam reveals no edema.        Pulmonary/Chest: Effort normal and breath sounds normal. No respiratory distress. Right breast exhibits tenderness. Right breast exhibits no inverted nipple, no mass, no skin change, no bleeding and no swelling. Left breast exhibits no inverted nipple, no mass, no skin change, no bleeding and no swelling. Breasts  are symmetrical.            Abdominal: Soft. Bowel sounds are normal. She exhibits no distension and no mass. There is no abdominal tenderness. There is no rebound and no guarding.   FF and below umbilicus  Uterus NON-tender     Genitourinary:    Pelvic exam was performed with patient supine.   The external female genitalia was normal.   No external genitalia lesions identified,Genitalia hair distrobution normal .             Musculoskeletal: Normal range of motion.       Neurological: She is alert and oriented to person, place, and time. She has normal reflexes. She displays normal reflexes. No cranial nerve deficit. She exhibits normal muscle tone. Coordination normal.   NO neck stiffness    Skin: Skin is warm and dry. No rash noted. No erythema.    Psychiatric: She has a normal mood and affect. Her behavior is normal.        Laboratory:  Recent Lab Results         09/21/24 1956 09/21/24 1942 09/21/24 1846        Albumin/Globulin Ratio     0.5       Albumin     2.3       ALP     143       ALT     46       Anion Gap     10       Aniso     2+       Appearance, UA Turbid           AST     59       Atypical Lymphocytes     Few       Bacteria, UA Many           Bands     4       Baso #     0.01       Basophil %     0.3       Bilirubin (UA) Negative           BILIRUBIN TOTAL     0.5       BUN     11       BUN/CREAT RATIO     13       Calcium     8.3       Chloride     109       CO2     27       Color, UA Light Red           SARS COV-2 MOLECULAR   Negative         Creatinine     0.83       Differential Method     Scan Smear       eGFR     100       Eos #     0.03       Eos %     0.9            4       Globulin, Total     4.2       Glucose     97       Glucose, UA Normal           Hematocrit     28.9       Hemoglobin     8.4       Hypo     Few       Immature Grans (Abs)     0.05       Immature Granulocytes     1.5       Ketones, UA Negative           Leukocyte Esterase, UA Large           Lymph #     0.49        Lymph %     14.6            15       MCH     23.7       MCHC     29.1       MCV     81.6       Metamyelocytes     1       Mono #     0.02       Mono %     0.6            1       MPV           Mucous Occasional           Neutrophils, Abs     2.76       Neutrophils Relative     82.1       NITRITE UA Negative           nRBC     6            2.7       NUCLEATED RBC ABSOLUTE     0.09       Blood, UA Large           Ovalocytes     Few       pH, UA 7.5           PLATELET MORPHOLOGY     Large & Giant Platelets       Platelet Count     99       Poly     Few       Potassium     3.1       PROTEIN TOTAL     6.5       Protein, UA 70           RBC     3.54       RBC, UA >182           RDW     21.1       Segmented Neutrophils, Man %     75       Sodium     143       Spec Grav UA 1.018           Squamous Epithelial Cells, UA Occasional           Trans Epithel, UA Occasional           UROBILINOGEN UA 3           WBC Clumps, UA Few           WBC, UA >182           WBC     3.36             I have personallly reviewed all pertinent lab results from the last 24 hours.    Diagnostic Results:  US: Reviewed  Assessment/Plan:     Renal/  UTI (urinary tract infection)    Urine Cx pending  Received dose Zosyn in ER and will receive Vantin on discharge    Obstetric  Breast engorgement, postpartum complication    Patient counseled to massage/decompress breasts and then use cool packs to start ductal involution as she is NOT breastfeed.    Monitor for bloody discharge  / erythema  Return to ER if worsens    Received Zosyn in ER and will receive Vantin for UTI (this will treat mastitis if present)    Orthopedic  Back pain    Tenderness over epidural injection site w/o si/sy of CSF leak.  No erythema around site  No neurologic findings to suggest an CNS infection    Other  Fever    No evidence of mastitis or endometritis.  Continued monitoring for these conditions in the puerperium         Thank you for your consult. I will sign off. Please  contact us if you have any additional questions.    Rolf Gates MD  Obstetrics & Gynecology  [facility] - Emergency Department

## 2024-09-23 LAB — UA COMPLETE W REFLEX CULTURE PNL UR: ABNORMAL

## 2024-09-24 ENCOUNTER — TELEPHONE (OUTPATIENT)
Dept: EMERGENCY MEDICINE | Facility: HOSPITAL | Age: 24
End: 2024-09-24
Payer: MEDICAID

## 2024-09-24 ENCOUNTER — PATIENT MESSAGE (OUTPATIENT)
Dept: EMERGENCY MEDICINE | Facility: HOSPITAL | Age: 24
End: 2024-09-24
Payer: MEDICAID

## 2024-09-24 ENCOUNTER — HOSPITAL ENCOUNTER (OUTPATIENT)
Facility: HOSPITAL | Age: 24
Discharge: HOME | End: 2024-09-26
Attending: OBSTETRICS & GYNECOLOGY | Admitting: OBSTETRICS & GYNECOLOGY
Payer: MEDICAID

## 2024-09-24 DIAGNOSIS — R78.81 POSITIVE BLOOD CULTURES: ICD-10-CM

## 2024-09-24 LAB
ALBUMIN SERPL BCP-MCNC: 2.5 G/DL (ref 3.5–5)
ALBUMIN/GLOB SERPL: 0.6 {RATIO}
ALP SERPL-CCNC: 118 U/L (ref 37–98)
ALT SERPL W P-5'-P-CCNC: 178 U/L (ref 13–56)
AMPHET UR QL SCN: NEGATIVE
AMYLASE SERPL-CCNC: 30 U/L (ref 25–115)
ANION GAP SERPL CALCULATED.3IONS-SCNC: 7 MMOL/L (ref 7–16)
ANISOCYTOSIS BLD QL SMEAR: NORMAL
AST SERPL W P-5'-P-CCNC: 111 U/L (ref 15–37)
BACTERIA #/AREA URNS HPF: ABNORMAL /HPF
BACTERIA BLD CULT: ABNORMAL
BACTERIA BLD CULT: ABNORMAL
BARBITURATES UR QL SCN: NEGATIVE
BASOPHILS # BLD AUTO: 0.03 K/UL (ref 0–0.2)
BASOPHILS NFR BLD AUTO: 0.6 % (ref 0–1)
BENZODIAZ METAB UR QL SCN: NEGATIVE
BILIRUB SERPL-MCNC: 0.4 MG/DL (ref ?–1.2)
BILIRUB UR QL STRIP: NEGATIVE
BUN SERPL-MCNC: 13 MG/DL (ref 7–18)
BUN/CREAT SERPL: 22 (ref 6–20)
CALCIUM SERPL-MCNC: 8.4 MG/DL (ref 8.5–10.1)
CANNABINOIDS UR QL SCN: NEGATIVE
CHLORIDE SERPL-SCNC: 111 MMOL/L (ref 98–107)
CLARITY UR: ABNORMAL
CO2 SERPL-SCNC: 27 MMOL/L (ref 21–32)
COCAINE UR QL SCN: NEGATIVE
COLOR UR: YELLOW
CREAT SERPL-MCNC: 0.59 MG/DL (ref 0.55–1.02)
DIFFERENTIAL METHOD BLD: ABNORMAL
EGFR (NO RACE VARIABLE) (RUSH/TITUS): 128 ML/MIN/1.73M2
EOSINOPHIL # BLD AUTO: 0.06 K/UL (ref 0–0.5)
EOSINOPHIL NFR BLD AUTO: 1.1 % (ref 1–4)
ERYTHROCYTE [DISTWIDTH] IN BLOOD BY AUTOMATED COUNT: 22.8 % (ref 11.5–14.5)
GLOBULIN SER-MCNC: 4.2 G/DL (ref 2–4)
GLUCOSE SERPL-MCNC: 87 MG/DL (ref 74–106)
GLUCOSE UR STRIP-MCNC: NORMAL MG/DL
GRAM STN SPEC: ABNORMAL
HCT VFR BLD AUTO: 30.3 % (ref 38–47)
HGB BLD-MCNC: 8.9 G/DL (ref 12–16)
HYPOCHROMIA BLD QL SMEAR: NORMAL
IMM GRANULOCYTES # BLD AUTO: 0.08 K/UL (ref 0–0.04)
IMM GRANULOCYTES NFR BLD: 1.5 % (ref 0–0.4)
KETONES UR STRIP-SCNC: NEGATIVE MG/DL
LACTATE SERPL-SCNC: 0.6 MMOL/L (ref 0.4–2)
LEUKOCYTE ESTERASE UR QL STRIP: ABNORMAL
LIPASE SERPL-CCNC: 21 U/L (ref 16–77)
LYMPHOCYTES # BLD AUTO: 1.33 K/UL (ref 1–4.8)
LYMPHOCYTES NFR BLD AUTO: 24.7 % (ref 27–41)
MCH RBC QN AUTO: 23.9 PG (ref 27–31)
MCHC RBC AUTO-ENTMCNC: 29.4 G/DL (ref 32–36)
MCV RBC AUTO: 81.5 FL (ref 80–96)
MONOCYTES # BLD AUTO: 0.63 K/UL (ref 0–0.8)
MONOCYTES NFR BLD AUTO: 11.7 % (ref 2–6)
MPC BLD CALC-MCNC: ABNORMAL G/DL
MUCOUS, UA: ABNORMAL /LPF
NEUTROPHILS # BLD AUTO: 3.25 K/UL (ref 1.8–7.7)
NEUTROPHILS NFR BLD AUTO: 60.4 % (ref 53–65)
NITRITE UR QL STRIP: POSITIVE
NRBC # BLD AUTO: 0.04 X10E3/UL
NRBC, AUTO (.00): 0.7 %
OPIATES UR QL SCN: NEGATIVE
OVALOCYTES BLD QL SMEAR: NORMAL
PCP UR QL SCN: NEGATIVE
PH UR STRIP: 6.5 PH UNITS
PLATELET # BLD AUTO: 170 K/UL (ref 150–400)
PLATELET MORPHOLOGY: NORMAL
POLYCHROMASIA BLD QL SMEAR: NORMAL
POTASSIUM SERPL-SCNC: 3 MMOL/L (ref 3.5–5.1)
PROT SERPL-MCNC: 6.7 G/DL (ref 6.4–8.2)
PROT UR QL STRIP: 50
RBC # BLD AUTO: 3.72 M/UL (ref 4.2–5.4)
RBC # UR STRIP: ABNORMAL /UL
RBC #/AREA URNS HPF: 76 /HPF
SODIUM SERPL-SCNC: 142 MMOL/L (ref 136–145)
SP GR UR STRIP: 1.02
SQUAMOUS #/AREA URNS LPF: ABNORMAL /HPF
UROBILINOGEN UR STRIP-ACNC: 3 MG/DL
WBC # BLD AUTO: 5.38 K/UL (ref 4.5–11)
WBC #/AREA URNS HPF: >182 /HPF
WBC CLUMPS, UA: ABNORMAL /HPF

## 2024-09-24 PROCEDURE — 96365 THER/PROPH/DIAG IV INF INIT: CPT

## 2024-09-24 PROCEDURE — G0378 HOSPITAL OBSERVATION PER HR: HCPCS

## 2024-09-24 PROCEDURE — 80053 COMPREHEN METABOLIC PANEL: CPT | Performed by: NURSE PRACTITIONER

## 2024-09-24 PROCEDURE — 25000003 PHARM REV CODE 250: Performed by: NURSE PRACTITIONER

## 2024-09-24 PROCEDURE — 87077 CULTURE AEROBIC IDENTIFY: CPT | Performed by: NURSE PRACTITIONER

## 2024-09-24 PROCEDURE — 82150 ASSAY OF AMYLASE: CPT | Performed by: NURSE PRACTITIONER

## 2024-09-24 PROCEDURE — 96375 TX/PRO/DX INJ NEW DRUG ADDON: CPT

## 2024-09-24 PROCEDURE — 80307 DRUG TEST PRSMV CHEM ANLYZR: CPT | Performed by: NURSE PRACTITIONER

## 2024-09-24 PROCEDURE — 81001 URINALYSIS AUTO W/SCOPE: CPT | Mod: XB | Performed by: NURSE PRACTITIONER

## 2024-09-24 PROCEDURE — 99285 EMERGENCY DEPT VISIT HI MDM: CPT | Mod: 25

## 2024-09-24 PROCEDURE — 81003 URINALYSIS AUTO W/O SCOPE: CPT | Performed by: NURSE PRACTITIONER

## 2024-09-24 PROCEDURE — 85025 COMPLETE CBC W/AUTO DIFF WBC: CPT | Performed by: NURSE PRACTITIONER

## 2024-09-24 PROCEDURE — 96361 HYDRATE IV INFUSION ADD-ON: CPT

## 2024-09-24 PROCEDURE — 87086 URINE CULTURE/COLONY COUNT: CPT | Performed by: NURSE PRACTITIONER

## 2024-09-24 PROCEDURE — 36415 COLL VENOUS BLD VENIPUNCTURE: CPT | Performed by: NURSE PRACTITIONER

## 2024-09-24 PROCEDURE — 83690 ASSAY OF LIPASE: CPT | Performed by: NURSE PRACTITIONER

## 2024-09-24 PROCEDURE — 11000001 HC ACUTE MED/SURG PRIVATE ROOM

## 2024-09-24 PROCEDURE — 63600175 PHARM REV CODE 636 W HCPCS: Performed by: NURSE PRACTITIONER

## 2024-09-24 PROCEDURE — 83605 ASSAY OF LACTIC ACID: CPT | Performed by: NURSE PRACTITIONER

## 2024-09-24 RX ORDER — KETOROLAC TROMETHAMINE 30 MG/ML
30 INJECTION, SOLUTION INTRAMUSCULAR; INTRAVENOUS
Status: COMPLETED | OUTPATIENT
Start: 2024-09-24 | End: 2024-09-24

## 2024-09-24 RX ORDER — ONDANSETRON 4 MG/1
8 TABLET, ORALLY DISINTEGRATING ORAL EVERY 8 HOURS PRN
Status: DISCONTINUED | OUTPATIENT
Start: 2024-09-24 | End: 2024-09-26 | Stop reason: HOSPADM

## 2024-09-24 RX ORDER — SODIUM CHLORIDE 9 MG/ML
1000 INJECTION, SOLUTION INTRAVENOUS
Status: COMPLETED | OUTPATIENT
Start: 2024-09-24 | End: 2024-09-25

## 2024-09-24 RX ORDER — HYDROCODONE BITARTRATE AND ACETAMINOPHEN 5; 325 MG/1; MG/1
1 TABLET ORAL EVERY 4 HOURS PRN
Status: DISCONTINUED | OUTPATIENT
Start: 2024-09-24 | End: 2024-09-26 | Stop reason: HOSPADM

## 2024-09-24 RX ORDER — SODIUM CHLORIDE 0.9 % (FLUSH) 0.9 %
10 SYRINGE (ML) INJECTION
Status: DISCONTINUED | OUTPATIENT
Start: 2024-09-24 | End: 2024-09-26 | Stop reason: HOSPADM

## 2024-09-24 RX ADMIN — SODIUM CHLORIDE 1000 ML: 9 INJECTION, SOLUTION INTRAVENOUS at 09:09

## 2024-09-24 RX ADMIN — SODIUM CHLORIDE 1000 ML: 9 INJECTION, SOLUTION INTRAVENOUS at 06:09

## 2024-09-24 RX ADMIN — PIPERACILLIN AND TAZOBACTAM 4.5 G: 4; .5 INJECTION, POWDER, FOR SOLUTION INTRAVENOUS; PARENTERAL at 09:09

## 2024-09-24 RX ADMIN — KETOROLAC TROMETHAMINE 30 MG: 30 INJECTION, SOLUTION INTRAMUSCULAR at 08:09

## 2024-09-24 NOTE — ED NOTES
Patient denies any complaints at this time.  Patient was called and told to come back to ED due to positive blood cultures and possible retained products of conception postpartum.

## 2024-09-24 NOTE — Clinical Note
Diagnosis: Retained products of conception, postpartum [602948]   Future Attending Provider: SHAVON MURCIA [[id number]]   Special Needs:: Fall Risk [15]

## 2024-09-25 LAB
ALBUMIN SERPL BCP-MCNC: 2.4 G/DL (ref 3.5–5)
ALBUMIN/GLOB SERPL: 0.6 {RATIO}
ALP SERPL-CCNC: 110 U/L (ref 37–98)
ALT SERPL W P-5'-P-CCNC: 139 U/L (ref 13–56)
ANION GAP SERPL CALCULATED.3IONS-SCNC: 11 MMOL/L (ref 7–16)
ANISOCYTOSIS BLD QL SMEAR: ABNORMAL
APTT PPP: 29.8 SECONDS (ref 25.2–37.3)
AST SERPL W P-5'-P-CCNC: 68 U/L (ref 15–37)
BASOPHILS # BLD AUTO: 0.04 K/UL (ref 0–0.2)
BASOPHILS NFR BLD AUTO: 0.7 % (ref 0–1)
BILIRUB SERPL-MCNC: 0.4 MG/DL (ref ?–1.2)
BUN SERPL-MCNC: 8 MG/DL (ref 7–18)
BUN/CREAT SERPL: 17 (ref 6–20)
CALCIUM SERPL-MCNC: 8 MG/DL (ref 8.5–10.1)
CHLORIDE SERPL-SCNC: 109 MMOL/L (ref 98–107)
CO2 SERPL-SCNC: 25 MMOL/L (ref 21–32)
CREAT SERPL-MCNC: 0.48 MG/DL (ref 0.55–1.02)
DIFFERENTIAL METHOD BLD: ABNORMAL
EGFR (NO RACE VARIABLE) (RUSH/TITUS): 135 ML/MIN/1.73M2
EOSINOPHIL # BLD AUTO: 0.06 K/UL (ref 0–0.5)
EOSINOPHIL NFR BLD AUTO: 1 % (ref 1–4)
ERYTHROCYTE [DISTWIDTH] IN BLOOD BY AUTOMATED COUNT: 22.5 % (ref 11.5–14.5)
GLOBULIN SER-MCNC: 4 G/DL (ref 2–4)
GLUCOSE SERPL-MCNC: 76 MG/DL (ref 74–106)
HCT VFR BLD AUTO: 31.3 % (ref 38–47)
HGB BLD-MCNC: 9 G/DL (ref 12–16)
IMM GRANULOCYTES # BLD AUTO: 0.08 K/UL (ref 0–0.04)
IMM GRANULOCYTES NFR BLD: 1.4 % (ref 0–0.4)
INR BLD: 1.05
LYMPHOCYTES # BLD AUTO: 1.33 K/UL (ref 1–4.8)
LYMPHOCYTES NFR BLD AUTO: 23 % (ref 27–41)
MAGNESIUM SERPL-MCNC: 5 MG/DL (ref 1.7–2.3)
MCH RBC QN AUTO: 23.9 PG (ref 27–31)
MCHC RBC AUTO-ENTMCNC: 28.8 G/DL (ref 32–36)
MCV RBC AUTO: 83.2 FL (ref 80–96)
MONOCYTES # BLD AUTO: 0.5 K/UL (ref 0–0.8)
MONOCYTES NFR BLD AUTO: 8.7 % (ref 2–6)
MPC BLD CALC-MCNC: ABNORMAL G/DL
NEUTROPHILS # BLD AUTO: 3.77 K/UL (ref 1.8–7.7)
NEUTROPHILS NFR BLD AUTO: 65.2 % (ref 53–65)
NRBC # BLD AUTO: 0.03 X10E3/UL
NRBC, AUTO (.00): 0.5 %
OVALOCYTES BLD QL SMEAR: ABNORMAL
PLATELET # BLD AUTO: 157 K/UL (ref 150–400)
PLATELET MORPHOLOGY: ABNORMAL
POTASSIUM SERPL-SCNC: 3.1 MMOL/L (ref 3.5–5.1)
PROT SERPL-MCNC: 6.4 G/DL (ref 6.4–8.2)
PROTHROMBIN TIME: 13.6 SECONDS (ref 11.7–14.7)
RBC # BLD AUTO: 3.76 M/UL (ref 4.2–5.4)
SODIUM SERPL-SCNC: 142 MMOL/L (ref 136–145)
URATE SERPL-MCNC: 2.9 MG/DL (ref 2.6–6)
WBC # BLD AUTO: 5.78 K/UL (ref 4.5–11)

## 2024-09-25 PROCEDURE — 11000001 HC ACUTE MED/SURG PRIVATE ROOM

## 2024-09-25 PROCEDURE — 96366 THER/PROPH/DIAG IV INF ADDON: CPT

## 2024-09-25 PROCEDURE — 83735 ASSAY OF MAGNESIUM: CPT | Performed by: ADVANCED PRACTICE MIDWIFE

## 2024-09-25 PROCEDURE — G0378 HOSPITAL OBSERVATION PER HR: HCPCS

## 2024-09-25 PROCEDURE — 25000003 PHARM REV CODE 250: Performed by: ADVANCED PRACTICE MIDWIFE

## 2024-09-25 PROCEDURE — 96367 TX/PROPH/DG ADDL SEQ IV INF: CPT

## 2024-09-25 PROCEDURE — 96361 HYDRATE IV INFUSION ADD-ON: CPT

## 2024-09-25 PROCEDURE — 63600175 PHARM REV CODE 636 W HCPCS: Performed by: ADVANCED PRACTICE MIDWIFE

## 2024-09-25 PROCEDURE — 80053 COMPREHEN METABOLIC PANEL: CPT | Performed by: ADVANCED PRACTICE MIDWIFE

## 2024-09-25 PROCEDURE — 96376 TX/PRO/DX INJ SAME DRUG ADON: CPT

## 2024-09-25 PROCEDURE — 25500020 PHARM REV CODE 255: Performed by: ADVANCED PRACTICE MIDWIFE

## 2024-09-25 PROCEDURE — 96375 TX/PRO/DX INJ NEW DRUG ADDON: CPT

## 2024-09-25 PROCEDURE — 96368 THER/DIAG CONCURRENT INF: CPT

## 2024-09-25 PROCEDURE — 36415 COLL VENOUS BLD VENIPUNCTURE: CPT | Performed by: ADVANCED PRACTICE MIDWIFE

## 2024-09-25 PROCEDURE — 25000003 PHARM REV CODE 250: Performed by: OBSTETRICS & GYNECOLOGY

## 2024-09-25 PROCEDURE — 85610 PROTHROMBIN TIME: CPT | Performed by: ADVANCED PRACTICE MIDWIFE

## 2024-09-25 PROCEDURE — 85025 COMPLETE CBC W/AUTO DIFF WBC: CPT | Performed by: ADVANCED PRACTICE MIDWIFE

## 2024-09-25 PROCEDURE — 84550 ASSAY OF BLOOD/URIC ACID: CPT | Performed by: ADVANCED PRACTICE MIDWIFE

## 2024-09-25 RX ORDER — CALCIUM GLUCONATE 98 MG/ML
1 INJECTION, SOLUTION INTRAVENOUS
Status: DISCONTINUED | OUTPATIENT
Start: 2024-09-25 | End: 2024-09-26 | Stop reason: HOSPADM

## 2024-09-25 RX ORDER — SODIUM CHLORIDE, SODIUM LACTATE, POTASSIUM CHLORIDE, CALCIUM CHLORIDE 600; 310; 30; 20 MG/100ML; MG/100ML; MG/100ML; MG/100ML
INJECTION, SOLUTION INTRAVENOUS CONTINUOUS
Status: DISCONTINUED | OUTPATIENT
Start: 2024-09-25 | End: 2024-09-26 | Stop reason: HOSPADM

## 2024-09-25 RX ORDER — LABETALOL HYDROCHLORIDE 5 MG/ML
20 INJECTION, SOLUTION INTRAVENOUS ONCE AS NEEDED
Status: COMPLETED | OUTPATIENT
Start: 2024-09-25 | End: 2024-09-25

## 2024-09-25 RX ORDER — SODIUM CHLORIDE 9 MG/ML
1000 INJECTION, SOLUTION INTRAVENOUS CONTINUOUS
Status: DISPENSED | OUTPATIENT
Start: 2024-09-25 | End: 2024-09-25

## 2024-09-25 RX ORDER — ACETAMINOPHEN 500 MG
1000 TABLET ORAL EVERY 6 HOURS PRN
Status: DISCONTINUED | OUTPATIENT
Start: 2024-09-25 | End: 2024-09-26 | Stop reason: HOSPADM

## 2024-09-25 RX ORDER — HYDRALAZINE HYDROCHLORIDE 20 MG/ML
10 INJECTION INTRAMUSCULAR; INTRAVENOUS ONCE AS NEEDED
Status: DISCONTINUED | OUTPATIENT
Start: 2024-09-25 | End: 2024-09-26 | Stop reason: HOSPADM

## 2024-09-25 RX ORDER — LABETALOL HYDROCHLORIDE 5 MG/ML
40 INJECTION, SOLUTION INTRAVENOUS ONCE AS NEEDED
Status: COMPLETED | OUTPATIENT
Start: 2024-09-25 | End: 2024-09-25

## 2024-09-25 RX ORDER — MAGNESIUM SULFATE HEPTAHYDRATE 40 MG/ML
4 INJECTION, SOLUTION INTRAVENOUS ONCE
Status: COMPLETED | OUTPATIENT
Start: 2024-09-25 | End: 2024-09-25

## 2024-09-25 RX ORDER — LABETALOL HYDROCHLORIDE 5 MG/ML
80 INJECTION, SOLUTION INTRAVENOUS ONCE AS NEEDED
Status: DISCONTINUED | OUTPATIENT
Start: 2024-09-25 | End: 2024-09-26 | Stop reason: HOSPADM

## 2024-09-25 RX ORDER — IOPAMIDOL 755 MG/ML
100 INJECTION, SOLUTION INTRAVASCULAR
Status: COMPLETED | OUTPATIENT
Start: 2024-09-25 | End: 2024-09-25

## 2024-09-25 RX ORDER — MAGNESIUM SULFATE HEPTAHYDRATE 40 MG/ML
2 INJECTION, SOLUTION INTRAVENOUS CONTINUOUS
Status: DISCONTINUED | OUTPATIENT
Start: 2024-09-25 | End: 2024-09-26

## 2024-09-25 RX ADMIN — PIPERACILLIN AND TAZOBACTAM 4.5 G: 4; .5 INJECTION, POWDER, FOR SOLUTION INTRAVENOUS; PARENTERAL at 04:09

## 2024-09-25 RX ADMIN — LABETALOL HYDROCHLORIDE 40 MG: 5 INJECTION, SOLUTION INTRAVENOUS at 11:09

## 2024-09-25 RX ADMIN — ACETAMINOPHEN 1000 MG: 500 TABLET ORAL at 07:09

## 2024-09-25 RX ADMIN — MAGNESIUM SULFATE HEPTAHYDRATE 4 G: 40 INJECTION, SOLUTION INTRAVENOUS at 09:09

## 2024-09-25 RX ADMIN — HYDROCODONE BITARTRATE AND ACETAMINOPHEN 1 TABLET: 5; 325 TABLET ORAL at 01:09

## 2024-09-25 RX ADMIN — MAGNESIUM SULFATE IN WATER 2 G/HR: 40 INJECTION, SOLUTION INTRAVENOUS at 10:09

## 2024-09-25 RX ADMIN — SODIUM CHLORIDE 1000 ML: 9 INJECTION, SOLUTION INTRAVENOUS at 07:09

## 2024-09-25 RX ADMIN — SODIUM CHLORIDE, POTASSIUM CHLORIDE, SODIUM LACTATE AND CALCIUM CHLORIDE: 600; 310; 30; 20 INJECTION, SOLUTION INTRAVENOUS at 10:09

## 2024-09-25 RX ADMIN — PIPERACILLIN AND TAZOBACTAM 4.5 G: 4; .5 INJECTION, POWDER, FOR SOLUTION INTRAVENOUS; PARENTERAL at 12:09

## 2024-09-25 RX ADMIN — IOPAMIDOL 100 ML: 755 INJECTION, SOLUTION INTRAVENOUS at 09:09

## 2024-09-25 RX ADMIN — LABETALOL HYDROCHLORIDE 20 MG: 5 INJECTION, SOLUTION INTRAVENOUS at 10:09

## 2024-09-25 NOTE — ED NOTES
Chula Davey Calls at this time and spoke to Primary RN and inquired why she wasn't sent to L&D. Primary RN advised that night shift reported she had to be on ABX due to positive blood cultures and they wouldn't take her last night. Provider reported to Primary RN that patient is to be sent to L&D and to call her personally if we have any issues.

## 2024-09-25 NOTE — PROGRESS NOTES
[facility] -  Labor and Delivery  Obstetrics  Postpartum Progress Note    Patient Name: Nida Gold  MRN: [id number]  Admission Date: 2024  Hospital Length of Stay: 1 days  Attending Physician: Chula Davey CNM  Primary Care Provider: Kita, Primary Doctor    Subjective:     Principal Problem:Hypertension in pregnancy, pre-eclampsia, severe, delivered/postpartum    Hospital course: Pt presented to ER with fever and bleeding. She was dx with UTI and postpartum pre eclampsia. She was admitted and started on magnesium sulfate therapy.    She is not doing well this morning. She is not tolerating a regular diet without nausea or vomiting. She is voiding spontaneously. She is ambulating. She has passed flatus, and has a BM. Vaginal bleeding is mild. She reports fever or chills. Abdominal pain is mild and controlled with oral medications.      Objective:     Vital Signs (Most Recent):  Temp: 97.9 °F (36.6 °C) (24 1600)  Pulse: 74 (24 1600)  Resp: 18 (24 1600)  BP: (!) 108/54 (24 1456)  SpO2: 95 % (24 1600) Vital Signs (24h Range):  Temp:  [97.9 °F (36.6 °C)-98.5 °F (36.9 °C)] 97.9 °F (36.6 °C)  Pulse:  [50-89] 74  Resp:  [16-18] 18  SpO2:  [87 %-100 %] 95 %  BP: (108-186)/() 108/54     Weight: 97.1 kg (214 lb)  Body mass index is 33.52 kg/m².      Intake/Output Summary (Last 24 hours) at 2024 1730  Last data filed at 2024 1600  Gross per 24 hour   Intake 3432.23 ml   Output 2700 ml   Net 732.23 ml       Physical Exam    Significant Labs:  Lab Results   Component Value Date    GROUPTRH B POS 2024    HEPBSAG Non-Reactive 2024     Recent Labs   Lab 24  0940   HGB 9.0*   HCT 31.3*       CBC:   Recent Labs   Lab 24  0940   WBC 5.78   RBC 3.76*   HGB 9.0*   HCT 31.3*      MCV 83.2   MCH 23.9*   MCHC 28.8*       Assessment/Plan:     24 y.o. female  at 39w0d for:    Active Diagnoses:    Diagnosis Date Noted POA    PRINCIPAL  PROBLEM:  Hypertension in pregnancy, pre-eclampsia, severe, delivered/postpartum [O14.15] 09/25/2024 Unknown      Problems Resolved During this Admission:       IV antibiotics  Magnesium sulfate therapy  Reposition for comfort  Verbalized understanding to all instructions and information  Questions answered to desired level of satisfaction      Disposition: As patient meets milestones, will plan to discharge in 2-3 days    Chula Davey CNM  Obstetrics  [facility] -  Labor and Delivery

## 2024-09-25 NOTE — ED NOTES
[facility] RN attempted to call report and was advised to pass off to dayshift due to they couldn't take her with positive blood culture and [facility] would have to approve her to be placed.

## 2024-09-25 NOTE — ED PROVIDER NOTES
Encounter Date: 9/24/2024       History     Chief Complaint   Patient presents with    Abnormal Lab     Pt presents to ed for positive blood cultures. Pt has no complaints at this time     Patient presents to the emergency department with c/o fever, chills and headache that started 5 days ago . She is 6 days post vaginal delivery, 09/17, 24 per Dr Stevens. She reports lower abdominal pain, with intermittent nausea and vomiting. She denies cough, shortness of breath or chest pain. She reports she is having some pain with urination but has been having this since she delivered. She reports some mild breast tenderness bilaterally. She is not breastfeeding.  She was seen in the ER on 09/19 and it was diagnosed with a urinary tract infection.  She was prescribed fentanyl by Dr. Gates for OB hospitalist but it was unable to  her medication because the pharmacy said it was back ordered.  Patient reports her symptoms have worsened over the last 24 hours.  She was states she was called today by ER staff and states she needed return to the ER because she was positive blood cultures.    The history is provided by the patient. No  was used.     Review of patient's allergies indicates:  No Known Allergies  Past Medical History:   Diagnosis Date    Menorrhagia with irregular cycle 5/12/2022     History reviewed. No pertinent surgical history.  Family History   Problem Relation Name Age of Onset    Hypertension Mother       Social History     Tobacco Use    Smoking status: Never     Passive exposure: Never    Smokeless tobacco: Never   Substance Use Topics    Alcohol use: Never    Drug use: Never     Review of Systems   Constitutional:  Positive for activity change, chills and fatigue.   Genitourinary:  Positive for dysuria, pelvic pain and vaginal bleeding.   Musculoskeletal:  Positive for myalgias.   Neurological:  Positive for weakness.   All other systems reviewed and are negative.      Physical Exam      Initial Vitals [09/24/24 1754]   BP Pulse Resp Temp SpO2   (!) 166/107 76 16 98.2 °F (36.8 °C) 99 %      MAP       --         Physical Exam    Nursing note and vitals reviewed.  Constitutional: Vital signs are normal. She appears well-developed and well-nourished. She is cooperative. She has a sickly appearance.   HENT:   Head: Normocephalic.   Nose: Nose normal.   Mouth/Throat: Oropharynx is clear and moist.   Eyes: Conjunctivae and EOM are normal.   Neck: Neck supple.   Normal range of motion.  Cardiovascular:  Normal rate, normal heart sounds and intact distal pulses.           Pulmonary/Chest: Breath sounds normal.   Abdominal: Abdomen is soft. Bowel sounds are normal. There is abdominal tenderness (Suprapubic tenderness to palpation).   Musculoskeletal:         General: Normal range of motion.      Cervical back: Normal range of motion and neck supple.     Neurological: She is alert and oriented to person, place, and time. She has normal strength. GCS score is 15. GCS eye subscore is 4. GCS verbal subscore is 5. GCS motor subscore is 6.   Skin: Skin is warm and dry. Capillary refill takes less than 2 seconds.   Psychiatric: She has a normal mood and affect. Her behavior is normal. Judgment and thought content normal.         Medical Screening Exam   See Full Note    ED Course   Procedures  Labs Reviewed   COMPREHENSIVE METABOLIC PANEL - Abnormal       Result Value    Sodium 142      Potassium 3.0 (*)     Chloride 111 (*)     CO2 27      Anion Gap 7      Glucose 87      BUN 13      Creatinine 0.59      BUN/Creatinine Ratio 22 (*)     Calcium 8.4 (*)     Total Protein 6.7      Albumin 2.5 (*)     Globulin 4.2 (*)     A/G Ratio 0.6      Bilirubin, Total 0.4      Alk Phos 118 (*)      (*)      (*)     eGFR 128     URINALYSIS, REFLEX TO URINE CULTURE - Abnormal    Color, UA Yellow      Clarity, UA Ex.Turbid      pH, UA 6.5      Leukocytes, UA Large (*)     Nitrites, UA Positive (*)     Protein, UA  50 (*)     Glucose, UA Normal      Ketones, UA Negative      Urobilinogen, UA 3 (*)     Bilirubin, UA Negative      Blood, UA Large (*)     Specific Gravity, UA 1.017     CBC WITH DIFFERENTIAL - Abnormal    WBC 5.38      RBC 3.72 (*)     Hemoglobin 8.9 (*)     Hematocrit 30.3 (*)     MCV 81.5      MCH 23.9 (*)     MCHC 29.4 (*)     RDW 22.8 (*)     Platelet Count 170      MPV        Neutrophils % 60.4      Lymphocytes % 24.7 (*)     Monocytes % 11.7 (*)     Eosinophils % 1.1      Basophils % 0.6      Immature Granulocytes % 1.5 (*)     nRBC, Auto 0.7 (*)     Neutrophils, Abs 3.25      Lymphocytes, Absolute 1.33      Monocytes, Absolute 0.63      Eosinophils, Absolute 0.06      Basophils, Absolute 0.03      Immature Granulocytes, Absolute 0.08 (*)     nRBC, Absolute 0.04 (*)     Diff Type Scan Smear     URINALYSIS, MICROSCOPIC - Abnormal    WBC, UA >182 (*)     RBC, UA 76 (*)     Bacteria, UA Many (*)     Squamous Epithelial Cells, UA Occasional (*)     WBC Clumps Moderate (*)     Mucous Many (*)    LACTIC ACID, PLASMA - Normal    Lactic Acid 0.6     DRUG SCREEN, URINE (BEAKER) - Normal    Barbiturates, Urine Negative      Benzodiazepine, Urine Negative      Opiates, Urine Negative      Phencyclidine, Urine Negative      Amphetamine, Urine Negative      Cannabinoid, Urine Negative      Cocaine, Urine Negative      Narrative:     The results of screening tests should be considered presumptive. Confirmatory testing is available upon request.    Cutoff Points:  PCP:         25ng/mL  AMPH:        500ng/mL  NICK:        200ng/mL  TIMOTEO:        200ng/mL  THC:         50ng/mL  BEATRICE:         300ng/mL  OPI:         2000ng/mL   AMYLASE - Normal    Amylase 30     LIPASE - Normal    Lipase 21     CULTURE, URINE   CBC W/ AUTO DIFFERENTIAL    Narrative:     The following orders were created for panel order CBC auto differential.  Procedure                               Abnormality         Status                     ---------                                -----------         ------                     CBC with Differential[[id number]]       Abnormal            Edited Result - FINAL      Manual Differential[[id number]]                             Final result                 Please view results for these tests on the individual orders.   MANUAL DIFFERENTIAL    Platelet Morphology Normal      Anisocytosis 3+      Ovalocytes Few      Polychromasia Few      Hypochromic Few            Imaging Results              US OB <14 Wks, TransAbd, Single Gestation (Final result)  Result time 09/24/24 20:36:18      Final result by Niko Mcfarland MD (09/24/24 20:36:18)                   Impression:      Heterogeneous avascular material within the endometrial canal extending toward the cervix suggests blood products.  No hyperemic component to suggest hypervascular retained products of conception.  Clinical correlation is advised.      Electronically signed by: Niko Mcfarland MD  Date:    09/24/2024  Time:    20:36               Narrative:    EXAMINATION:  US OB <14 WEEKS TRANSABDOM, SINGLE GESTATION    CLINICAL HISTORY:  Third-stage hemorrhage    TECHNIQUE:  Real-time sonography was performed of the pelvis using transabdominal technique.    COMPARISON:  09/21/2024    FINDINGS:  There is heterogeneous material within the endometrium noting the endometrial stripe measures 1.8 cm.  No abnormal endometrial hyperemia.  Cervix is unremarkable.    The right ovary measures approximately 3.8 x 1.9 x 2.6 cm.  Left ovary measures approximately 3.9 x 2.2 x 3.1 cm.  Arterial and venous flow is documented to the ovaries bilaterally.  No significant fluid in the pelvis.                                       Medications   piperacillin-tazobactam (ZOSYN) 4.5 g in D5W 100 mL IVPB (MB+) (4.5 g Intravenous New Bag 9/24/24 2135)   sodium chloride 0.9% bolus 1,000 mL 1,000 mL (0 mLs Intravenous Stopped 9/24/24 2042)   ketorolac injection 30 mg (30 mg Intravenous Given 9/24/24  2048)   0.9%  NaCl infusion (1,000 mLs Intravenous New Bag 9/24/24 2135)     Medical Decision Making  Patient presents to the emergency department with c/o fever, chills and headache that started 5 days ago . She is 6 days post vaginal delivery, 09/17, 24 per Dr Stevens. She reports lower abdominal pain, with intermittent nausea and vomiting. She denies cough, shortness of breath or chest pain. She reports she is having some pain with urination but has been having this since she delivered. She reports some mild breast tenderness bilaterally. She is not breastfeeding.  She was seen in the ER on 09/19 and it was diagnosed with a urinary tract infection.  She was prescribed fentanyl by Dr. Gates for OB hospitalist but it was unable to  her medication because the pharmacy said it was back ordered.  Patient reports her symptoms have worsened over the last 24 hours.  She was states she was called today by ER staff and states she needed return to the ER because she was positive blood cultures.      Amount and/or Complexity of Data Reviewed  Labs: ordered.  Radiology: ordered. Decision-making details documented in ED Course.     Details: Pelvic ultrasound: Heterogeneous avascular material within the endometrial canal extending toward the cervix suggests blood products.  No hyperemic component to suggest hypervascular retained products of conception.  Clinical correlation is advised.       Discussion of management or test interpretation with external provider(s): Initiate IV, collect lab work  1 L normal saline bolus  Zosyn 4.5 g IV piggyback to treat pelvic infection/retained products of conception/sepsis    Toradol 30 mg IV to treat headache  Normal saline 1000 mL at 125ml/. per hour    Dr. Joseph, OB hospitalist, called in consult we will see patient in ER.  Recommends admission to her service, possible D&C in a.m..    Risk  Prescription drug management.               ED Course as of 09/24/24 2200   Tue Sep 24, 2024    1928 US OB <14 Wks, TransAbd, Single Gestation [CQ]      ED Course User Index  [CQ] America Lal FNP                           Clinical Impression:   Final diagnoses:  [O72.0] Retained products of conception, postpartum (Primary)  [R78.81] Positive blood cultures  [O86.20] Postpartum urinary tract infection        ED Disposition Condition    Observation Stable                America Lal FNP  09/24/24 2200

## 2024-09-25 NOTE — PLAN OF CARE
Problem: Adult Inpatient Plan of Care  Goal: Plan of Care Review  Outcome: Progressing  Goal: Patient-Specific Goal (Individualized)  Outcome: Progressing  Goal: Absence of Hospital-Acquired Illness or Injury  Outcome: Progressing  Goal: Optimal Comfort and Wellbeing  Outcome: Progressing  Goal: Readiness for Transition of Care  Outcome: Progressing     Problem:  Fall Injury Risk  Goal: Absence of Fall, Infant Drop and Related Injury  Outcome: Progressing     Problem: Infection  Goal: Absence of Infection Signs and Symptoms  Outcome: Progressing     Problem: Hypertensive Disorders in Pregnancy  Goal: Patient-Fetal Stabilization  Outcome: Progressing

## 2024-09-25 NOTE — H&P
[facility] - Emergency Department  Obstetrics & Gynecology  History & Physical    Patient Name: Nida Gold  MRN: [id number]  Admission Date: 2024  Primary Care Provider: Kita, Primary Doctor    Subjective:     Chief Complaint/Reason for Admission: Bacteremia after  24    History of Present Illness: 25 Y/O  who is a week S/P  without complications called back for positive blood cultures. She was seen in the ER on  for general malaise with fever, chills and abdominal pain. Was diagnosed with UTI but never started her abx since the pharmacy did not have them in stock. Today her blood cultures and urine culture came back positive for E-coli and patient was called back. She denies fever, abdominal pain or heavy bleeding but has intermittent chills and dysuria sx. Her ultrasound shows some possible POC . Could be blood clots. She denies heavy bleeding or severe cramping.    No current facility-administered medications on file prior to encounter.     Current Outpatient Medications on File Prior to Encounter   Medication Sig    albuterol (VENTOLIN HFA) 90 mcg/actuation inhaler Inhale 2 puffs into the lungs every 6 (six) hours as needed for Wheezing. Rescue (Patient not taking: Reported on 2022)    cefpodoxime (VANTIN) 200 MG tablet Take 1 tablet (200 mg total) by mouth every 12 (twelve) hours. for 10 days    ferrous sulfate 325 (65 FE) MG EC tablet Take 1 tablet (325 mg total) by mouth 2 (two) times daily.    ibuprofen (ADVIL,MOTRIN) 800 MG tablet Take 1 tablet (800 mg total) by mouth every 8 (eight) hours as needed.       Review of patient's allergies indicates:  No Known Allergies    Past Medical History:   Diagnosis Date    Menorrhagia with irregular cycle 2022     OB History    Para Term  AB Living   3 3 3     3   SAB IAB Ectopic Multiple Live Births         0 3      # Outcome Date GA Lbr Montez/2nd Weight Sex Type Anes PTL Lv   3 Term 24 39w0d  3.145 kg  (6 lb 14.9 oz) M Vag-Vacuum EPI N SAYM      Complications: Fetal Intolerance   2 Term 10/01/19 39w0d  3.175 kg (7 lb) F Vag-Spont EPI N SAMY   1 Term 03/02/16 41w0d  3.6 kg (7 lb 15 oz) F Vag-Spont EPI  SAMY     History reviewed. No pertinent surgical history.  Family History       Problem Relation (Age of Onset)    Hypertension Mother          Tobacco Use    Smoking status: Never     Passive exposure: Never    Smokeless tobacco: Never   Substance and Sexual Activity    Alcohol use: Never    Drug use: Never    Sexual activity: Yes     Partners: Male     Review of Systems   Constitutional:  Positive for chills and fever.   Respiratory: Negative.     Cardiovascular: Negative.    Gastrointestinal:  Negative for abdominal pain, diarrhea, nausea and vomiting.   Genitourinary:  Positive for dysuria. Negative for flank pain.   Musculoskeletal: Negative.    Neurological: Negative.  Negative for headaches.   Psychiatric/Behavioral: Negative.       Objective:     Vital Signs (Most Recent):  Temp: 98.2 °F (36.8 °C) (09/24/24 1754)  Pulse: 76 (09/24/24 1754)  Resp: 16 (09/24/24 1754)  BP: (!) 166/107 (09/24/24 1754)  SpO2: 99 % (09/24/24 1754) Vital Signs (24h Range):  Temp:  [98.2 °F (36.8 °C)] 98.2 °F (36.8 °C)  Pulse:  [76] 76  Resp:  [16] 16  SpO2:  [99 %] 99 %  BP: (166)/(107) 166/107     Weight: 97.1 kg (214 lb)  Body mass index is 33.52 kg/m².  Patient's last menstrual period was 12/20/2023 (exact date).    Physical Exam:   Constitutional: She is oriented to person, place, and time. She appears well-developed and well-nourished. No distress.    HENT:   Head: Normocephalic and atraumatic.      Cardiovascular:  Normal rate and regular rhythm.      Exam reveals no edema.        Pulmonary/Chest: Effort normal. No respiratory distress.        Abdominal: Soft. She exhibits no distension. There is no abdominal tenderness.     Genitourinary:    Genitourinary Comments: Cervix closed and minimal lochea on glove  Uterux firm, 12 weeks  size and no TTP  Adnexa : no masses or TTP                 Neurological: She is alert and oriented to person, place, and time.     Psychiatric: She has a normal mood and affect.       Laboratory:  I have personallly reviewed all pertinent lab results from the last 24 hours.    Diagnostic Results:  Labs: Reviewed  U/S: reviewed  Assessment/Plan:     Post Partum fever and chills with + blood cultures and positive urine  cx for E-Coli  Possible retained products vs. Clot  Normal WBC and currently afebrile. Non tender exam  Will start Zosyn and keep NPO after midnight. May need D&C        Meaghan Joseph MD  Obstetrics & Gynecology  [facility] - Emergency Department

## 2024-09-26 ENCOUNTER — PATIENT MESSAGE (OUTPATIENT)
Dept: OBSTETRICS AND GYNECOLOGY | Facility: CLINIC | Age: 24
End: 2024-09-26
Payer: MEDICAID

## 2024-09-26 VITALS
DIASTOLIC BLOOD PRESSURE: 61 MMHG | HEIGHT: 67 IN | SYSTOLIC BLOOD PRESSURE: 121 MMHG | OXYGEN SATURATION: 98 % | TEMPERATURE: 99 F | RESPIRATION RATE: 18 BRPM | WEIGHT: 193.81 LBS | HEART RATE: 83 BPM | BODY MASS INDEX: 30.42 KG/M2

## 2024-09-26 LAB
ALBUMIN SERPL BCP-MCNC: 2.5 G/DL (ref 3.5–5)
ALBUMIN/GLOB SERPL: 0.6 {RATIO}
ALP SERPL-CCNC: 111 U/L (ref 37–98)
ALT SERPL W P-5'-P-CCNC: 104 U/L (ref 13–56)
ANION GAP SERPL CALCULATED.3IONS-SCNC: 9 MMOL/L (ref 7–16)
ANISOCYTOSIS BLD QL SMEAR: ABNORMAL
AST SERPL W P-5'-P-CCNC: 48 U/L (ref 15–37)
BASOPHILS # BLD AUTO: 0.03 K/UL (ref 0–0.2)
BASOPHILS NFR BLD AUTO: 0.5 % (ref 0–1)
BILIRUB SERPL-MCNC: 0.5 MG/DL (ref ?–1.2)
BUN SERPL-MCNC: 3 MG/DL (ref 7–18)
BUN/CREAT SERPL: 4 (ref 6–20)
CALCIUM SERPL-MCNC: 7.7 MG/DL (ref 8.5–10.1)
CHLORIDE SERPL-SCNC: 107 MMOL/L (ref 98–107)
CO2 SERPL-SCNC: 29 MMOL/L (ref 21–32)
CREAT SERPL-MCNC: 0.71 MG/DL (ref 0.55–1.02)
CRENATED CELLS: ABNORMAL
DIFFERENTIAL METHOD BLD: ABNORMAL
EGFR (NO RACE VARIABLE) (RUSH/TITUS): 121 ML/MIN/1.73M2
EOSINOPHIL # BLD AUTO: 0.06 K/UL (ref 0–0.5)
EOSINOPHIL NFR BLD AUTO: 1 % (ref 1–4)
ERYTHROCYTE [DISTWIDTH] IN BLOOD BY AUTOMATED COUNT: 22.6 % (ref 11.5–14.5)
GLOBULIN SER-MCNC: 4.2 G/DL (ref 2–4)
GLUCOSE SERPL-MCNC: 97 MG/DL (ref 74–106)
HCT VFR BLD AUTO: 33.4 % (ref 38–47)
HGB BLD-MCNC: 9.8 G/DL (ref 12–16)
IMM GRANULOCYTES # BLD AUTO: 0.05 K/UL (ref 0–0.04)
IMM GRANULOCYTES NFR BLD: 0.8 % (ref 0–0.4)
LYMPHOCYTES # BLD AUTO: 1.05 K/UL (ref 1–4.8)
LYMPHOCYTES NFR BLD AUTO: 16.7 % (ref 27–41)
MAGNESIUM SERPL-MCNC: 5.3 MG/DL (ref 1.7–2.3)
MAGNESIUM SERPL-MCNC: 5.8 MG/DL (ref 1.7–2.3)
MCH RBC QN AUTO: 24 PG (ref 27–31)
MCHC RBC AUTO-ENTMCNC: 29.3 G/DL (ref 32–36)
MCV RBC AUTO: 81.7 FL (ref 80–96)
MONOCYTES # BLD AUTO: 0.5 K/UL (ref 0–0.8)
MONOCYTES NFR BLD AUTO: 8 % (ref 2–6)
MPC BLD CALC-MCNC: 12.9 FL (ref 9.4–12.4)
NEUTROPHILS # BLD AUTO: 4.59 K/UL (ref 1.8–7.7)
NEUTROPHILS NFR BLD AUTO: 73 % (ref 53–65)
NRBC # BLD AUTO: 0.04 X10E3/UL
NRBC, AUTO (.00): 0.6 %
OVALOCYTES BLD QL SMEAR: ABNORMAL
PLATELET # BLD AUTO: 190 K/UL (ref 150–400)
PLATELET MORPHOLOGY: ABNORMAL
POLYCHROMASIA BLD QL SMEAR: ABNORMAL
POTASSIUM SERPL-SCNC: 3 MMOL/L (ref 3.5–5.1)
PROT SERPL-MCNC: 6.7 G/DL (ref 6.4–8.2)
RBC # BLD AUTO: 4.09 M/UL (ref 4.2–5.4)
SODIUM SERPL-SCNC: 142 MMOL/L (ref 136–145)
UA COMPLETE W REFLEX CULTURE PNL UR: ABNORMAL
WBC # BLD AUTO: 6.28 K/UL (ref 4.5–11)

## 2024-09-26 PROCEDURE — G0378 HOSPITAL OBSERVATION PER HR: HCPCS

## 2024-09-26 PROCEDURE — 25000003 PHARM REV CODE 250: Performed by: OBSTETRICS & GYNECOLOGY

## 2024-09-26 PROCEDURE — 63600175 PHARM REV CODE 636 W HCPCS: Performed by: ADVANCED PRACTICE MIDWIFE

## 2024-09-26 PROCEDURE — 36415 COLL VENOUS BLD VENIPUNCTURE: CPT | Performed by: ADVANCED PRACTICE MIDWIFE

## 2024-09-26 PROCEDURE — 85025 COMPLETE CBC W/AUTO DIFF WBC: CPT | Performed by: ADVANCED PRACTICE MIDWIFE

## 2024-09-26 PROCEDURE — 96368 THER/DIAG CONCURRENT INF: CPT

## 2024-09-26 PROCEDURE — 83735 ASSAY OF MAGNESIUM: CPT | Performed by: ADVANCED PRACTICE MIDWIFE

## 2024-09-26 PROCEDURE — 96366 THER/PROPH/DIAG IV INF ADDON: CPT

## 2024-09-26 PROCEDURE — 80053 COMPREHEN METABOLIC PANEL: CPT | Performed by: ADVANCED PRACTICE MIDWIFE

## 2024-09-26 PROCEDURE — 25000003 PHARM REV CODE 250: Performed by: ADVANCED PRACTICE MIDWIFE

## 2024-09-26 RX ORDER — CIPROFLOXACIN 500 MG/1
500 TABLET ORAL EVERY 12 HOURS
Qty: 20 TABLET | Refills: 0 | Status: SHIPPED | OUTPATIENT
Start: 2024-09-26

## 2024-09-26 RX ORDER — LABETALOL 200 MG/1
200 TABLET, FILM COATED ORAL EVERY 12 HOURS
Status: DISCONTINUED | OUTPATIENT
Start: 2024-09-26 | End: 2024-09-26 | Stop reason: HOSPADM

## 2024-09-26 RX ORDER — LABETALOL 200 MG/1
200 TABLET, FILM COATED ORAL EVERY 12 HOURS
Qty: 60 TABLET | Refills: 11 | Status: SHIPPED | OUTPATIENT
Start: 2024-09-26 | End: 2025-09-26

## 2024-09-26 RX ADMIN — LABETALOL HYDROCHLORIDE 200 MG: 200 TABLET, FILM COATED ORAL at 08:09

## 2024-09-26 RX ADMIN — ACETAMINOPHEN 1000 MG: 500 TABLET ORAL at 10:09

## 2024-09-26 RX ADMIN — PIPERACILLIN AND TAZOBACTAM 4.5 G: 4; .5 INJECTION, POWDER, FOR SOLUTION INTRAVENOUS; PARENTERAL at 12:09

## 2024-09-26 RX ADMIN — SODIUM CHLORIDE, POTASSIUM CHLORIDE, SODIUM LACTATE AND CALCIUM CHLORIDE: 600; 310; 30; 20 INJECTION, SOLUTION INTRAVENOUS at 12:09

## 2024-09-26 RX ADMIN — MAGNESIUM SULFATE IN WATER 2 G/HR: 40 INJECTION, SOLUTION INTRAVENOUS at 04:09

## 2024-09-26 RX ADMIN — PIPERACILLIN AND TAZOBACTAM 4.5 G: 4; .5 INJECTION, POWDER, FOR SOLUTION INTRAVENOUS; PARENTERAL at 07:09

## 2024-09-26 NOTE — DISCHARGE SUMMARY
"[facility] -  Labor and Delivery  Obstetrics & Gynecology  Discharge Summary    Patient Name: Nida Gold  MRN: [id number]  Admission Date: 9/24/2024  Hospital Length of Stay: 2 days  Discharge Date and Time: No discharge date for patient encounter.  Attending Physician: Chula Davey CNM   Discharging Provider: Chula Davey CNM  Primary Care Provider: Kita, Primary Doctor    HPI:  9/26/2024 Pt indicates she feels better. She denies any pain, h/a, discomforts, or other problems.     Hospital Course:  Pt presented to ER for UTI with h/o chronic hypertension now superimposed pre eclampsia on PPD 6. She had a slight fever, cultures obtained were noted with E. Coli. Pt started on zosyn abx therapy and magnesium sulfate 4 gram bolus then 2 grams/hr. Magnesium was Dc'd and started on labetalol 200 mg po BID. She will be discharged home and to f/u with me on Monday for BP check.     Goals of Care Treatment Preferences:  Code Status: Full Code      * No surgery found *         Significant Diagnostic Studies: Labs: CBC   Recent Labs   Lab 09/24/24  1818 09/25/24  0940 09/26/24  0956   WBC 5.38 5.78 6.28   HGB 8.9* 9.0* 9.8*   HCT 30.3* 31.3* 33.4*    157 190     Microbiology: Blood Culture No results found for: "LABBLOO"      Pending Diagnostic Studies:       Procedure Component Value Units Date/Time    Protein/Creatinine Ratio, Urine [[id number]]     Order Status: Sent Lab Status: No result     Specimen: Urine, Clean Catch           Final Active Diagnoses:    Diagnosis Date Noted POA    PRINCIPAL PROBLEM:  Hypertension in pregnancy, pre-eclampsia, severe, delivered/postpartum [O14.15] 09/25/2024 Yes    Acute cystitis without hematuria [N30.00] 07/15/2024 Yes      Problems Resolved During this Admission:        Discharged Condition: good    Disposition: Home or Self Care    Follow Up:   Follow-up Information       Chula Davey CNM Follow up in 4 day(s).    Specialty: Obstetrics and " Gynecology  Why: blood pressure and postpartum visit  Contact information:  [address] [place] [address]  [phone]                           Patient Instructions:      Diet Adult Regular     No driving until:   Order Comments: No driving for 2 weeks     Notify your health care provider if you experience any of the following:  temperature >100.4     Notify your health care provider if you experience any of the following:  persistent nausea and vomiting or diarrhea     Notify your health care provider if you experience any of the following:  severe uncontrolled pain     Notify your health care provider if you experience any of the following:  difficulty breathing or increased cough     Notify your health care provider if you experience any of the following:  severe persistent headache     Notify your health care provider if you experience any of the following:  worsening rash     Notify your health care provider if you experience any of the following:  persistent dizziness, light-headedness, or visual disturbances     Notify your health care provider if you experience any of the following:  increased confusion or weakness     Activity as tolerated     Medications:  Reconciled Home Medications:      Medication List        START taking these medications      ciprofloxacin HCl 500 MG tablet  Commonly known as: CIPRO  Take 1 tablet (500 mg total) by mouth every 12 (twelve) hours.     labetaloL 200 MG tablet  Commonly known as: NORMODYNE  Take 1 tablet (200 mg total) by mouth every 12 (twelve) hours.            CONTINUE taking these medications      albuterol 90 mcg/actuation inhaler  Commonly known as: VENTOLIN HFA  Inhale 2 puffs into the lungs every 6 (six) hours as needed for Wheezing. Rescue     cefpodoxime 200 MG tablet  Commonly known as: VANTIN  Take 1 tablet (200 mg total) by mouth every 12 (twelve) hours. for 10 days     ferrous sulfate 325 (65 FE) MG EC tablet  Take 1 tablet (325 mg total) by mouth 2  (two) times daily.     ibuprofen 800 MG tablet  Commonly known as: ADVIL,MOTRIN  Take 1 tablet (800 mg total) by mouth every 8 (eight) hours as needed.              Chula Davey CNM  Obstetrics & Gynecology  [facility] -  Labor and Delivery

## 2024-09-26 NOTE — PLAN OF CARE
Problem: Adult Inpatient Plan of Care  Goal: Plan of Care Review  2024 1448 by Lupe Tong RN  Outcome: Met  2024 1448 by Lupe Tong RN  Outcome: Progressing  2024 0741 by Lupe Tong RN  Outcome: Progressing  Goal: Patient-Specific Goal (Individualized)  2024 1448 by Lupe Tong RN  Outcome: Met  2024 1448 by Lupe Tong RN  Outcome: Progressing  2024 0741 by Lupe Tong RN  Outcome: Progressing  Goal: Absence of Hospital-Acquired Illness or Injury  2024 1448 by Lupe Tong RN  Outcome: Met  2024 1448 by Lupe Tong RN  Outcome: Progressing  2024 07 by Lupe Tong RN  Outcome: Progressing  Goal: Optimal Comfort and Wellbeing  2024 1448 by Lupe Tong RN  Outcome: Met  2024 1448 by Lupe Tong RN  Outcome: Progressing  2024 0741 by Lupe Tong RN  Outcome: Progressing  Goal: Readiness for Transition of Care  2024 1448 by Lupe Tong RN  Outcome: Met  2024 1448 by Lupe Tong RN  Outcome: Progressing  2024 0741 by Lupe Tong RN  Outcome: Progressing     Problem:  Fall Injury Risk  Goal: Absence of Fall, Infant Drop and Related Injury  2024 1448 by Lupe Tong, NE  Outcome: Met  2024 1448 by Lupe Tong RN  Outcome: Progressing  2024 07 by Lupe Tong RN  Outcome: Progressing     Problem: Infection  Goal: Absence of Infection Signs and Symptoms  2024 1448 by Lupe Tong, NE  Outcome: Met  2024 1448 by Lupe Tong RN  Outcome: Progressing  2024 07 by Lupe Tong RN  Outcome: Progressing     Problem: Hypertensive Disorders in Pregnancy  Goal: Patient-Fetal Stabilization  2024 1448 by Lupe Tong RN  Outcome: Met  2024 1448 by Lupe Tong RN  Outcome: Progressing  2024 0741 by Lupe Tong, RN  Outcome: Progressing

## 2024-09-26 NOTE — SUBJECTIVE & OBJECTIVE
Interval History: Postpartum Day 8    Scheduled Meds:   labetaloL  200 mg Oral Q12H    piperacillin-tazobactam (Zosyn) IV (PEDS and ADULTS) (extended infusion is not appropriate)  4.5 g Intravenous Q8H     Continuous Infusions:   lactated ringers   Intravenous Continuous   Stopped at 09/26/24 0724     PRN Meds:  Current Facility-Administered Medications:     acetaminophen, 1,000 mg, Oral, Q6H PRN    calcium gluconate, 1 g, Intravenous, PRN    hydrALAZINE, 10 mg, Intravenous, Once PRN    HYDROcodone-acetaminophen, 1 tablet, Oral, Q4H PRN    labetalol, 80 mg, Intravenous, Once PRN    ondansetron, 8 mg, Oral, Q8H PRN    sodium chloride 0.9%, 10 mL, Intravenous, PRN    Review of patient's allergies indicates:  No Known Allergies    Objective:     Vital Signs (Most Recent):  Temp: 99 °F (37.2 °C) (09/26/24 0726)  Pulse: 83 (09/26/24 0933)  Resp: 18 (09/26/24 0726)  BP: 121/61 (09/26/24 0926)  SpO2: 98 % (09/26/24 0933) Vital Signs (24h Range):  Temp:  [97.8 °F (36.6 °C)-99 °F (37.2 °C)] 99 °F (37.2 °C)  Pulse:  [55-98] 83  Resp:  [17-18] 18  SpO2:  [90 %-100 %] 98 %  BP: (108-170)/(54-99) 121/61     Weight: 87.9 kg (193 lb 12.8 oz)  Body mass index is 30.35 kg/m².  Patient's last menstrual period was 12/20/2023 (exact date).    I&O (Last 24H):    Intake/Output Summary (Last 24 hours) at 9/26/2024 1158  Last data filed at 9/26/2024 0945  Gross per 24 hour   Intake 2874.49 ml   Output 7250 ml   Net -4375.51 ml         Laboratory:  CBC:   Recent Labs   Lab 09/26/24  0956   WBC 6.28   RBC 4.09*   HGB 9.8*   HCT 33.4*      MCV 81.7   MCH 24.0*   MCHC 29.3*     Recent Lab Results         09/26/24  0956   09/26/24  0553   09/26/24  0011   09/25/24  1920        Albumin/Globulin Ratio 0.6             Albumin 2.5                                       Anion Gap 9             Aniso 2+             AST 48             Baso # 0.03             Basophil % 0.5             BILIRUBIN TOTAL 0.5             BUN 3              BUN/CREAT RATIO 4             Calcium 7.7             Chloride 107             CO2 29             Creatinine 0.71             Crenated RBC's Few             Differential Method Scan Smear             eGFR 121             Eos # 0.06             Eos % 1.0             Globulin, Total 4.2             Glucose 97             Hematocrit 33.4             Hemoglobin 9.8             Immature Grans (Abs) 0.05             Immature Granulocytes 0.8             Lymph # 1.05             Lymph % 16.7             Magnesium    5.8   5.3   5.0       MCH 24.0             MCHC 29.3             MCV 81.7             Mono # 0.50             Mono % 8.0             MPV 12.9             Neutrophils, Abs 4.59             Neutrophils Relative 73.0             nRBC 0.6             NUCLEATED RBC ABSOLUTE 0.04             Ovalocytes Few             PLATELET MORPHOLOGY Large & Giant Platelets             Platelet Count 190             Poly Few             Potassium 3.0             PROTEIN TOTAL 6.7             RBC 4.09             RDW 22.6             Sodium 142             WBC 6.28                     Diagnostic Results:  Blood cultures noted with E. coli     Physical Exam:   Constitutional: She is oriented to person, place, and time.        Pulmonary/Chest: Effort normal.        Abdominal: Soft.             Musculoskeletal: Moves all extremeties.       Neurological: She is alert and oriented to person, place, and time.    Skin: Skin is warm and dry.    Psychiatric: She has a normal mood and affect. Her behavior is normal. Judgment and thought content normal.        Review of Systems   All other systems reviewed and are negative.

## 2024-09-26 NOTE — HPI
9/26/2024 Pt indicates she feels better. She denies any pain, h/a, discomforts, or other problems.

## 2024-09-26 NOTE — PLAN OF CARE
Problem: Adult Inpatient Plan of Care  Goal: Plan of Care Review  2024 by Lupe Tong RN  Outcome: Progressing  2024 by Lupe Tong RN  Outcome: Progressing  Goal: Patient-Specific Goal (Individualized)  2024 by Lupe Tong RN  Outcome: Progressing  2024 by Lupe Tong RN  Outcome: Progressing  Goal: Absence of Hospital-Acquired Illness or Injury  2024 by Lupe Tong RN  Outcome: Progressing  2024 by Lupe Tong RN  Outcome: Progressing  Goal: Optimal Comfort and Wellbeing  2024 by Lupe Tong RN  Outcome: Progressing  2024 by Lupe Tong RN  Outcome: Progressing  Goal: Readiness for Transition of Care  2024 by Lupe Tong RN  Outcome: Progressing  2024 by Lupe Tong RN  Outcome: Progressing     Problem:  Fall Injury Risk  Goal: Absence of Fall, Infant Drop and Related Injury  2024 by Lupe Tong RN  Outcome: Progressing  2024 by Lupe Tong RN  Outcome: Progressing     Problem: Infection  Goal: Absence of Infection Signs and Symptoms  2024 by Lupe Tong RN  Outcome: Progressing  2024 by Lupe Tong RN  Outcome: Progressing     Problem: Hypertensive Disorders in Pregnancy  Goal: Patient-Fetal Stabilization  2024 by Lupe Tong RN  Outcome: Progressing  2024 by Lupe Tong RN  Outcome: Progressing

## 2024-09-26 NOTE — PLAN OF CARE
Problem: Adult Inpatient Plan of Care  Goal: Plan of Care Review  2024 by Silke Burdick RN  Outcome: Progressing  2024 by Silke Burdick RN  Outcome: Progressing  Goal: Patient-Specific Goal (Individualized)  2024 by Silke Burdick RN  Outcome: Progressing  2024 by Silke Burdick RN  Outcome: Progressing  Goal: Absence of Hospital-Acquired Illness or Injury  2024 by Silke Burdick RN  Outcome: Progressing  2024 by Silke Burdick RN  Outcome: Progressing  Goal: Optimal Comfort and Wellbeing  2024 by Silke Burdick RN  Outcome: Progressing  2024 by Silke Burdick RN  Outcome: Progressing  Goal: Readiness for Transition of Care  2024 by Silke Burdick RN  Outcome: Progressing  2024 by Silke Burdick RN  Outcome: Progressing     Problem:  Fall Injury Risk  Goal: Absence of Fall, Infant Drop and Related Injury  2024 by Silke Burdick RN  Outcome: Progressing  2024 by Silke Burdick RN  Outcome: Progressing     Problem: Infection  Goal: Absence of Infection Signs and Symptoms  2024 by Silke Burdick RN  Outcome: Progressing  2024 by Silke Burdick RN  Outcome: Progressing     Problem: Hypertensive Disorders in Pregnancy  Goal: Patient-Fetal Stabilization  2024 by Silke Burdick RN  Outcome: Progressing  2024 by Silke Burdick RN  Outcome: Progressing      none

## 2024-09-26 NOTE — HOSPITAL COURSE
Pt presented to ER for UTI with h/o chronic hypertension now superimposed pre eclampsia on PPD 6. She had a slight fever, cultures obtained were noted with E. Coli. Pt started on zosyn abx therapy and magnesium sulfate 4 gram bolus then 2 grams/hr. Magnesium was Dc'd and started on labetalol 200 mg po BID. She will be discharged home and to f/u with me on Monday for BP check.

## 2024-09-26 NOTE — PROGRESS NOTES
[facility] -  Labor and Delivery  Obstetrics & Gynecology  Progress Note    Patient Name: Nida Gold  MRN: [id number]  Admission Date: 9/24/2024  Primary Care Provider: No, Primary Doctor  Principal Problem: Hypertension in pregnancy, pre-eclampsia, severe, delivered/postpartum    Subjective:     HPI:  9/26/2024 Pt indicates she feels better. She denies any pain, h/a, discomforts, or other problems.     Interval History: Postpartum Day 8    Scheduled Meds:   labetaloL  200 mg Oral Q12H    piperacillin-tazobactam (Zosyn) IV (PEDS and ADULTS) (extended infusion is not appropriate)  4.5 g Intravenous Q8H     Continuous Infusions:   lactated ringers   Intravenous Continuous   Stopped at 09/26/24 0724     PRN Meds:  Current Facility-Administered Medications:     acetaminophen, 1,000 mg, Oral, Q6H PRN    calcium gluconate, 1 g, Intravenous, PRN    hydrALAZINE, 10 mg, Intravenous, Once PRN    HYDROcodone-acetaminophen, 1 tablet, Oral, Q4H PRN    labetalol, 80 mg, Intravenous, Once PRN    ondansetron, 8 mg, Oral, Q8H PRN    sodium chloride 0.9%, 10 mL, Intravenous, PRN    Review of patient's allergies indicates:  No Known Allergies    Objective:     Vital Signs (Most Recent):  Temp: 99 °F (37.2 °C) (09/26/24 0726)  Pulse: 83 (09/26/24 0933)  Resp: 18 (09/26/24 0726)  BP: 121/61 (09/26/24 0926)  SpO2: 98 % (09/26/24 0933) Vital Signs (24h Range):  Temp:  [97.8 °F (36.6 °C)-99 °F (37.2 °C)] 99 °F (37.2 °C)  Pulse:  [55-98] 83  Resp:  [17-18] 18  SpO2:  [90 %-100 %] 98 %  BP: (108-170)/(54-99) 121/61     Weight: 87.9 kg (193 lb 12.8 oz)  Body mass index is 30.35 kg/m².  Patient's last menstrual period was 12/20/2023 (exact date).    I&O (Last 24H):    Intake/Output Summary (Last 24 hours) at 9/26/2024 1158  Last data filed at 9/26/2024 0945  Gross per 24 hour   Intake 2874.49 ml   Output 7250 ml   Net -4375.51 ml         Laboratory:  CBC:   Recent Labs   Lab 09/26/24  0956   WBC 6.28   RBC 4.09*   HGB 9.8*   HCT  33.4*      MCV 81.7   MCH 24.0*   MCHC 29.3*     Recent Lab Results         09/26/24  0956   09/26/24  0553   09/26/24  0011   09/25/24  1920        Albumin/Globulin Ratio 0.6             Albumin 2.5                                       Anion Gap 9             Aniso 2+             AST 48             Baso # 0.03             Basophil % 0.5             BILIRUBIN TOTAL 0.5             BUN 3             BUN/CREAT RATIO 4             Calcium 7.7             Chloride 107             CO2 29             Creatinine 0.71             Crenated RBC's Few             Differential Method Scan Smear             eGFR 121             Eos # 0.06             Eos % 1.0             Globulin, Total 4.2             Glucose 97             Hematocrit 33.4             Hemoglobin 9.8             Immature Grans (Abs) 0.05             Immature Granulocytes 0.8             Lymph # 1.05             Lymph % 16.7             Magnesium    5.8   5.3   5.0       MCH 24.0             MCHC 29.3             MCV 81.7             Mono # 0.50             Mono % 8.0             MPV 12.9             Neutrophils, Abs 4.59             Neutrophils Relative 73.0             nRBC 0.6             NUCLEATED RBC ABSOLUTE 0.04             Ovalocytes Few             PLATELET MORPHOLOGY Large & Giant Platelets             Platelet Count 190             Poly Few             Potassium 3.0             PROTEIN TOTAL 6.7             RBC 4.09             RDW 22.6             Sodium 142             WBC 6.28                     Diagnostic Results:  Blood cultures noted with E. coli     Physical Exam:   Constitutional: She is oriented to person, place, and time.        Pulmonary/Chest: Effort normal.        Abdominal: Soft.             Musculoskeletal: Moves all extremeties.       Neurological: She is alert and oriented to person, place, and time.    Skin: Skin is warm and dry.    Psychiatric: She has a normal mood and affect. Her behavior is normal. Judgment  and thought content normal.        Review of Systems   All other systems reviewed and are negative.      Assessment/Plan:     Acute cystitis without hematuria  PO antibiotics          Chula Davey CNM  Obstetrics & Gynecology  [facility] -  Labor and Delivery

## 2024-10-08 ENCOUNTER — OFFICE VISIT (OUTPATIENT)
Dept: OBSTETRICS AND GYNECOLOGY | Facility: CLINIC | Age: 24
End: 2024-10-08
Payer: MEDICAID

## 2024-10-08 VITALS
DIASTOLIC BLOOD PRESSURE: 92 MMHG | HEIGHT: 67 IN | HEART RATE: 74 BPM | BODY MASS INDEX: 31.23 KG/M2 | SYSTOLIC BLOOD PRESSURE: 143 MMHG | RESPIRATION RATE: 18 BRPM | TEMPERATURE: 98 F | OXYGEN SATURATION: 98 % | WEIGHT: 199 LBS

## 2024-10-08 DIAGNOSIS — Z30.09 CONTRACEPTIVE EDUCATION: ICD-10-CM

## 2024-10-08 PROCEDURE — 3077F SYST BP >= 140 MM HG: CPT | Mod: CPTII,,, | Performed by: ADVANCED PRACTICE MIDWIFE

## 2024-10-08 PROCEDURE — 3044F HG A1C LEVEL LT 7.0%: CPT | Mod: CPTII,,, | Performed by: ADVANCED PRACTICE MIDWIFE

## 2024-10-08 PROCEDURE — 0503F POSTPARTUM CARE VISIT: CPT | Mod: ,,, | Performed by: ADVANCED PRACTICE MIDWIFE

## 2024-10-08 PROCEDURE — 1159F MED LIST DOCD IN RCRD: CPT | Mod: CPTII,,, | Performed by: ADVANCED PRACTICE MIDWIFE

## 2024-10-08 PROCEDURE — 3080F DIAST BP >= 90 MM HG: CPT | Mod: CPTII,,, | Performed by: ADVANCED PRACTICE MIDWIFE

## 2024-10-08 PROCEDURE — 3008F BODY MASS INDEX DOCD: CPT | Mod: CPTII,,, | Performed by: ADVANCED PRACTICE MIDWIFE

## 2024-10-08 PROCEDURE — 1111F DSCHRG MED/CURRENT MED MERGE: CPT | Mod: CPTII,,, | Performed by: ADVANCED PRACTICE MIDWIFE

## 2024-10-08 RX ORDER — LABETALOL 300 MG/1
300 TABLET, FILM COATED ORAL 2 TIMES DAILY
Qty: 60 TABLET | Refills: 6 | Status: SHIPPED | OUTPATIENT
Start: 2024-10-08 | End: 2024-11-07

## 2024-10-08 NOTE — PROGRESS NOTES
"CC: Post-partum follow-up    Nida Gold is a 24 y.o. female  who presents for post-partum visit.  She is S/P a .  She and the baby are both doing well.  No pain.  No fever.   No bowel / bladder complaints.    Delivery Date: 2024  Delivery MD: Chula Davey  Gender: male  Birth Weight: 6 pounds 14 ounces  Breast Feeding: NO  Depression: NO  Contraception: Nexplanon    Pregnancy was complicated by:  None    BP (!) 143/92 (BP Location: Right arm, Patient Position: Sitting)   Pulse 74   Temp 98.2 °F (36.8 °C) (Oral)   Resp 18   Ht 5' 7" (1.702 m)   Wt 90.3 kg (199 lb)   LMP 2023 (Exact Date)   SpO2 98%   Breastfeeding No   BMI 31.17 kg/m²     ROS:  GENERAL: No fever, chills, fatigability.  VULVAR: No pain, no lesions and no itching.  VAGINAL: No relaxation, no itching, no discharge, no abnormal bleeding and no lesions.  ABDOMEN: No abdominal pain. Denies nausea. Denies vomiting. No diarrhea. No constipation  BREAST: Denies pain. No lumps. No discharge.  URINARY: No incontinence, no nocturia, no frequency and no dysuria.  CARDIOVASCULAR: No chest pain. No shortness of breath. No leg cramps.  NEUROLOGICAL: No headaches. No vision changes.    PHYSICAL EXAM:  ABDOMEN:  Soft, non-tender, non-distended  VULVA:  Normal, no lesions  CERVIX:  deferred  UTERUS:  Normal size, shape, consistency, no mass or tenderness.  ADNEXA:  deferred    IMP:  3 Weeks Postpartum  Status Post   Encounter for postpartum visit    Postpartum hypertension  -     labetaloL (NORMODYNE) 300 MG tablet; Take 1 tablet (300 mg total) by mouth 2 (two) times daily.  Dispense: 60 tablet; Refill: 6    Contraceptive education  -     Device Authorization Order        ICD-10-CM ICD-9-CM    1. Encounter for postpartum visit  Z39.2 V24.2       2. Postpartum hypertension  O16.5 642.94 labetaloL (NORMODYNE) 300 MG tablet      3. Contraceptive education  Z30.09 V25.09 Device Authorization Order          PLAN:  May resume " normal activities after 6 weeks postpartum/post  section  May be released to return to work 6 weeks postpartum/post  section  Birth control options and counseling discussed  Verbalized understanding to all information and instructions  Questions answered to desired level of satisfaction    Follow up in about 1 week (around 10/15/2024), or if symptoms worsen or fail to improve, for postpartum visit/exam in 1 wk and 2 wks for nexplanon insertion.

## 2024-10-14 PROBLEM — O60.03 PRETERM LABOR WITHOUT DELIVERY, THIRD TRIMESTER: Status: RESOLVED | Noted: 2024-07-15 | Resolved: 2024-10-14

## 2024-10-22 ENCOUNTER — OFFICE VISIT (OUTPATIENT)
Dept: OBSTETRICS AND GYNECOLOGY | Facility: CLINIC | Age: 24
End: 2024-10-22
Payer: MEDICAID

## 2024-10-22 VITALS
DIASTOLIC BLOOD PRESSURE: 89 MMHG | SYSTOLIC BLOOD PRESSURE: 125 MMHG | HEART RATE: 76 BPM | RESPIRATION RATE: 18 BRPM | WEIGHT: 202 LBS | HEIGHT: 67 IN | BODY MASS INDEX: 31.71 KG/M2 | TEMPERATURE: 98 F | OXYGEN SATURATION: 99 %

## 2024-10-22 PROCEDURE — 3008F BODY MASS INDEX DOCD: CPT | Mod: CPTII,,, | Performed by: ADVANCED PRACTICE MIDWIFE

## 2024-10-22 PROCEDURE — 3074F SYST BP LT 130 MM HG: CPT | Mod: CPTII,,, | Performed by: ADVANCED PRACTICE MIDWIFE

## 2024-10-22 PROCEDURE — 3044F HG A1C LEVEL LT 7.0%: CPT | Mod: CPTII,,, | Performed by: ADVANCED PRACTICE MIDWIFE

## 2024-10-22 PROCEDURE — 1159F MED LIST DOCD IN RCRD: CPT | Mod: CPTII,,, | Performed by: ADVANCED PRACTICE MIDWIFE

## 2024-10-22 PROCEDURE — 3079F DIAST BP 80-89 MM HG: CPT | Mod: CPTII,,, | Performed by: ADVANCED PRACTICE MIDWIFE

## 2024-10-22 PROCEDURE — 0503F POSTPARTUM CARE VISIT: CPT | Mod: ,,, | Performed by: ADVANCED PRACTICE MIDWIFE

## 2024-10-22 NOTE — PROGRESS NOTES
"CC: Post-partum follow-up    Nida Gold is a 24 y.o. female  who presents for post-partum visit.  She is S/P a .  She and the baby are both doing well.  No pain.  No fever.   No bowel / bladder complaints.     Delivery Date: 2024  Delivery MD: Chula Davey  Gender: male  Birth Weight: 6 pounds 14 ounces  Breast Feeding: NO  Depression: NO  Contraception: Nexplanon     Pregnancy was complicated by:  None      /89   Pulse 76   Temp 98.3 °F (36.8 °C) (Oral)   Resp 18   Ht 5' 7" (1.702 m)   Wt 91.6 kg (202 lb)   LMP 2023 (Exact Date)   SpO2 99%   Breastfeeding No   BMI 31.64 kg/m²     ROS:  GENERAL: No fever, chills, fatigability.  VULVAR: No pain, no lesions and no itching.  VAGINAL: No relaxation, no itching, no discharge, no abnormal bleeding and no lesions.  ABDOMEN: No abdominal pain. Denies nausea. Denies vomiting. No diarrhea. No constipation  BREAST: Denies pain. No lumps. No discharge.  URINARY: No incontinence, no nocturia, no frequency and no dysuria.  CARDIOVASCULAR: No chest pain. No shortness of breath. No leg cramps.  NEUROLOGICAL: No headaches. No vision changes.    PHYSICAL EXAM:  ABDOMEN:  Soft, non-tender, non-distended  VULVA:  Normal, no lesions  CERVIX:  Without lesions, polyps or tenderness.  UTERUS:  Normal size, shape, consistency, no mass or tenderness.  ADNEXA:  Normal in size without mass or tenderness    IMP:  4 Weeks Postpartum  Status Post   Postpartum exam  -     Cancel: POCT urine pregnancy        ICD-10-CM ICD-9-CM    1. Postpartum exam  Z39.2 V24.2 CANCELED: POCT urine pregnancy          PLAN:  May resume normal activities after 6 weeks  May return to school/work after 6 weeks postpartum   Birth control options and counseling discussed  Verbalized understanding to all information and instructions  Questions answered to desired level of satisfaction    Follow up in about 1 day (around 10/23/2024), or if symptoms worsen or fail to " improve, for Nexplanon insertion.

## 2024-10-23 ENCOUNTER — OFFICE VISIT (OUTPATIENT)
Dept: OBSTETRICS AND GYNECOLOGY | Facility: CLINIC | Age: 24
End: 2024-10-23
Payer: MEDICAID

## 2024-10-23 VITALS
HEART RATE: 68 BPM | OXYGEN SATURATION: 98 % | HEIGHT: 67 IN | TEMPERATURE: 98 F | WEIGHT: 200.38 LBS | BODY MASS INDEX: 31.45 KG/M2 | DIASTOLIC BLOOD PRESSURE: 80 MMHG | SYSTOLIC BLOOD PRESSURE: 132 MMHG | RESPIRATION RATE: 18 BRPM

## 2024-10-23 DIAGNOSIS — Z30.017 NEXPLANON INSERTION: Primary | ICD-10-CM

## 2024-10-23 PROCEDURE — 99499 UNLISTED E&M SERVICE: CPT | Mod: ,,, | Performed by: ADVANCED PRACTICE MIDWIFE

## 2024-10-23 PROCEDURE — 11981 INSERTION DRUG DLVR IMPLANT: CPT | Mod: ,,, | Performed by: ADVANCED PRACTICE MIDWIFE

## 2024-10-23 NOTE — PROCEDURES
Insertion of Nexplanon    Date/Time: 10/23/2024 9:30 AM    Performed by: Chula Davey CNM  Authorized by: Chula Davey CNM    Consent:   Consent obtained:  Prior to procedure the appropriate consent was completed and verified  Consent given by:  Patient  Patient questions answered: yes    Patient agrees, verbalizes understanding, and wants to proceed: yes    Educational handouts given: yes    Instructions and paperwork completed: yes    Pre-Procedure:   Pre-procedure timeout performed: yes    Anesthesia premedication: N/A.  Prepped with: alcohol 70%    Local anesthetic:  Lidocaine 1%  The site was cleaned and prepped in a sterile fashion: yes    Insertion Procedure:   Left/right:  left arm   68 mg etonogestreL 68 mg  Preloaded Implanon trocar was placed subdermally: yes    Visualization of implant was obtained: yes    Nexplanon was inserted and trocar removed: yes    Visualization of notch in stilette and palpitation of device: yes    Palpation confirms placement by provider and patient: yes    Site was closed with steri-strips and pressure bandage applied: yes

## 2024-11-14 ENCOUNTER — OFFICE VISIT (OUTPATIENT)
Dept: OBSTETRICS AND GYNECOLOGY | Facility: CLINIC | Age: 24
End: 2024-11-14
Payer: MEDICAID

## 2024-11-14 VITALS
TEMPERATURE: 98 F | HEART RATE: 77 BPM | HEIGHT: 67 IN | DIASTOLIC BLOOD PRESSURE: 85 MMHG | RESPIRATION RATE: 18 BRPM | WEIGHT: 203 LBS | OXYGEN SATURATION: 99 % | SYSTOLIC BLOOD PRESSURE: 136 MMHG | BODY MASS INDEX: 31.86 KG/M2

## 2024-11-14 DIAGNOSIS — Z30.46 ENCOUNTER FOR SURVEILLANCE OF NEXPLANON SUBDERMAL CONTRACEPTIVE: Primary | ICD-10-CM

## 2024-11-14 PROCEDURE — 1159F MED LIST DOCD IN RCRD: CPT | Mod: CPTII,,, | Performed by: ADVANCED PRACTICE MIDWIFE

## 2024-11-14 PROCEDURE — 3044F HG A1C LEVEL LT 7.0%: CPT | Mod: CPTII,,, | Performed by: ADVANCED PRACTICE MIDWIFE

## 2024-11-14 PROCEDURE — 99214 OFFICE O/P EST MOD 30 MIN: CPT | Mod: ,,, | Performed by: ADVANCED PRACTICE MIDWIFE

## 2024-11-14 PROCEDURE — 3075F SYST BP GE 130 - 139MM HG: CPT | Mod: CPTII,,, | Performed by: ADVANCED PRACTICE MIDWIFE

## 2024-11-14 PROCEDURE — 3079F DIAST BP 80-89 MM HG: CPT | Mod: CPTII,,, | Performed by: ADVANCED PRACTICE MIDWIFE

## 2024-11-14 PROCEDURE — 3008F BODY MASS INDEX DOCD: CPT | Mod: CPTII,,, | Performed by: ADVANCED PRACTICE MIDWIFE

## 2024-11-14 RX ORDER — MEDROXYPROGESTERONE ACETATE 10 MG/1
10 TABLET ORAL DAILY
Qty: 7 TABLET | Refills: 0 | Status: SHIPPED | OUTPATIENT
Start: 2024-11-14 | End: 2025-11-14

## 2024-11-14 NOTE — PROGRESS NOTES
CC: Nexplanon check    HPI:  Nida Gold is a 24 y.o. female  presents for IUD check.  Patient had a Nexplanon placed 3 weeks ago.  She is having problems with heavy bleeding and denies cramping, fever or discharge.  She is not having any other problems since Nexplanon was placed.     PHYSICAL EXAM:   General: well groomed, good eye contact. Awake, alert, and oriented  Arm: nontender to touch with Nexplanon palpated in left upper inner arm area  Skin: warm and dry to touch    ASSESSMENT AND PLAN:  1. Encounter for surveillance of Nexplanon subdermal contraceptive  medroxyPROGESTERone (PROVERA) 10 MG tablet        Discussed using provera to stop bleeding  Patient counseled on nexplanon and effects of nexplanon during use.   F/u in 1 week for evaluation of bleeding with nexplanon  Verbalized understanding to all instructions and information  Questions answered to desired level of satisfaction    Return for annual GYN exam    Chula Davey, JENNIFER, CNM, WHNP-BC

## 2024-11-22 ENCOUNTER — PATIENT MESSAGE (OUTPATIENT)
Dept: RESEARCH | Facility: HOSPITAL | Age: 24
End: 2024-11-22

## 2024-11-27 ENCOUNTER — PATIENT MESSAGE (OUTPATIENT)
Dept: RESEARCH | Facility: HOSPITAL | Age: 24
End: 2024-11-27
Payer: MEDICAID